# Patient Record
Sex: FEMALE | Race: WHITE | NOT HISPANIC OR LATINO
[De-identification: names, ages, dates, MRNs, and addresses within clinical notes are randomized per-mention and may not be internally consistent; named-entity substitution may affect disease eponyms.]

---

## 2017-01-11 ENCOUNTER — LABORATORY RESULT (OUTPATIENT)
Age: 82
End: 2017-01-11

## 2017-01-23 ENCOUNTER — MEDICATION RENEWAL (OUTPATIENT)
Age: 82
End: 2017-01-23

## 2017-01-25 ENCOUNTER — CHART COPY (OUTPATIENT)
Age: 82
End: 2017-01-25

## 2017-01-26 ENCOUNTER — MEDICATION RENEWAL (OUTPATIENT)
Age: 82
End: 2017-01-26

## 2017-02-13 ENCOUNTER — LABORATORY RESULT (OUTPATIENT)
Age: 82
End: 2017-02-13

## 2017-03-14 ENCOUNTER — APPOINTMENT (OUTPATIENT)
Dept: INTERNAL MEDICINE | Facility: CLINIC | Age: 82
End: 2017-03-14

## 2017-03-16 ENCOUNTER — LABORATORY RESULT (OUTPATIENT)
Age: 82
End: 2017-03-16

## 2017-04-24 ENCOUNTER — LABORATORY RESULT (OUTPATIENT)
Age: 82
End: 2017-04-24

## 2017-05-17 ENCOUNTER — APPOINTMENT (OUTPATIENT)
Dept: ELECTROPHYSIOLOGY | Facility: CLINIC | Age: 82
End: 2017-05-17

## 2017-05-17 VITALS — DIASTOLIC BLOOD PRESSURE: 65 MMHG | SYSTOLIC BLOOD PRESSURE: 138 MMHG | OXYGEN SATURATION: 97 %

## 2017-05-25 ENCOUNTER — APPOINTMENT (OUTPATIENT)
Dept: INTERNAL MEDICINE | Facility: CLINIC | Age: 82
End: 2017-05-25

## 2017-05-25 ENCOUNTER — MEDICATION RENEWAL (OUTPATIENT)
Age: 82
End: 2017-05-25

## 2017-05-25 VITALS
WEIGHT: 142 LBS | OXYGEN SATURATION: 95 % | SYSTOLIC BLOOD PRESSURE: 159 MMHG | DIASTOLIC BLOOD PRESSURE: 67 MMHG | TEMPERATURE: 97.4 F | HEART RATE: 70 BPM | BODY MASS INDEX: 24.37 KG/M2

## 2017-05-26 LAB
25(OH)D3 SERPL-MCNC: 38.9 NG/ML
ALBUMIN SERPL ELPH-MCNC: 4.5 G/DL
ALP BLD-CCNC: 110 U/L
ALT SERPL-CCNC: 29 U/L
ANION GAP SERPL CALC-SCNC: 22 MMOL/L
AST SERPL-CCNC: 30 U/L
BASOPHILS # BLD AUTO: 0.03 K/UL
BASOPHILS NFR BLD AUTO: 0.4 %
BILIRUB SERPL-MCNC: 0.4 MG/DL
BUN SERPL-MCNC: 41 MG/DL
CALCIUM SERPL-MCNC: 10 MG/DL
CHLORIDE SERPL-SCNC: 106 MMOL/L
CHOLEST SERPL-MCNC: 169 MG/DL
CHOLEST/HDLC SERPL: 3.5 RATIO
CO2 SERPL-SCNC: 17 MMOL/L
CREAT SERPL-MCNC: 1.51 MG/DL
EOSINOPHIL # BLD AUTO: 0.13 K/UL
EOSINOPHIL NFR BLD AUTO: 1.5 %
FOLATE SERPL-MCNC: >20 NG/ML
GLUCOSE SERPL-MCNC: 95 MG/DL
HBA1C MFR BLD HPLC: 5.7 %
HCT VFR BLD CALC: 39.2 %
HDLC SERPL-MCNC: 48 MG/DL
HGB BLD-MCNC: 12.7 G/DL
IMM GRANULOCYTES NFR BLD AUTO: 0.1 %
LDLC SERPL CALC-MCNC: 85 MG/DL
LYMPHOCYTES # BLD AUTO: 2.67 K/UL
LYMPHOCYTES NFR BLD AUTO: 31.7 %
MAN DIFF?: NORMAL
MCHC RBC-ENTMCNC: 30.9 PG
MCHC RBC-ENTMCNC: 32.4 GM/DL
MCV RBC AUTO: 95.4 FL
MONOCYTES # BLD AUTO: 0.71 K/UL
MONOCYTES NFR BLD AUTO: 8.4 %
NEUTROPHILS # BLD AUTO: 4.87 K/UL
NEUTROPHILS NFR BLD AUTO: 57.9 %
PLATELET # BLD AUTO: 292 K/UL
POTASSIUM SERPL-SCNC: 4.6 MMOL/L
PROT SERPL-MCNC: 7.4 G/DL
RBC # BLD: 4.11 M/UL
RBC # FLD: 15.1 %
SODIUM SERPL-SCNC: 145 MMOL/L
T4 SERPL-MCNC: 8.4 UG/DL
TRIGL SERPL-MCNC: 179 MG/DL
TSH SERPL-ACNC: 2.97 UIU/ML
VIT B12 SERPL-MCNC: 1405 PG/ML
WBC # FLD AUTO: 8.42 K/UL

## 2017-06-18 ENCOUNTER — NON-APPOINTMENT (OUTPATIENT)
Age: 82
End: 2017-06-18

## 2017-06-28 ENCOUNTER — APPOINTMENT (OUTPATIENT)
Dept: INTERNAL MEDICINE | Facility: CLINIC | Age: 82
End: 2017-06-28

## 2017-06-28 ENCOUNTER — MED ADMIN CHARGE (OUTPATIENT)
Age: 82
End: 2017-06-28

## 2017-06-28 VITALS
SYSTOLIC BLOOD PRESSURE: 138 MMHG | HEART RATE: 70 BPM | OXYGEN SATURATION: 94 % | WEIGHT: 139 LBS | TEMPERATURE: 97.6 F | BODY MASS INDEX: 23.73 KG/M2 | HEIGHT: 64 IN | DIASTOLIC BLOOD PRESSURE: 71 MMHG

## 2017-06-28 DIAGNOSIS — M25.562 PAIN IN RIGHT KNEE: ICD-10-CM

## 2017-06-28 DIAGNOSIS — M25.561 PAIN IN RIGHT KNEE: ICD-10-CM

## 2017-07-31 ENCOUNTER — MEDICATION RENEWAL (OUTPATIENT)
Age: 82
End: 2017-07-31

## 2017-08-21 ENCOUNTER — APPOINTMENT (OUTPATIENT)
Dept: INTERNAL MEDICINE | Facility: CLINIC | Age: 82
End: 2017-08-21
Payer: MEDICARE

## 2017-08-21 VITALS
DIASTOLIC BLOOD PRESSURE: 75 MMHG | HEIGHT: 64 IN | HEART RATE: 71 BPM | OXYGEN SATURATION: 98 % | TEMPERATURE: 98 F | SYSTOLIC BLOOD PRESSURE: 118 MMHG

## 2017-08-21 LAB
INR PPP: 2.9 RATIO
QUALITY CONTROL: YES

## 2017-08-21 PROCEDURE — 99214 OFFICE O/P EST MOD 30 MIN: CPT | Mod: 25

## 2017-08-21 PROCEDURE — 85610 PROTHROMBIN TIME: CPT | Mod: QW

## 2017-08-22 LAB
ALBUMIN SERPL ELPH-MCNC: 3.9 G/DL
ALP BLD-CCNC: 119 U/L
ALT SERPL-CCNC: 40 U/L
ANION GAP SERPL CALC-SCNC: 18 MMOL/L
AST SERPL-CCNC: 23 U/L
BASOPHILS # BLD AUTO: 0.02 K/UL
BASOPHILS NFR BLD AUTO: 0.3 %
BILIRUB SERPL-MCNC: 0.3 MG/DL
BUN SERPL-MCNC: 48 MG/DL
CALCIUM SERPL-MCNC: 10 MG/DL
CHLORIDE SERPL-SCNC: 107 MMOL/L
CO2 SERPL-SCNC: 17 MMOL/L
CREAT SERPL-MCNC: 1.98 MG/DL
EOSINOPHIL # BLD AUTO: 0.11 K/UL
EOSINOPHIL NFR BLD AUTO: 1.7 %
GLUCOSE SERPL-MCNC: 120 MG/DL
HCT VFR BLD CALC: 34.7 %
HGB BLD-MCNC: 10.9 G/DL
IMM GRANULOCYTES NFR BLD AUTO: 0.2 %
LYMPHOCYTES # BLD AUTO: 2.06 K/UL
LYMPHOCYTES NFR BLD AUTO: 31.5 %
MAN DIFF?: NORMAL
MCHC RBC-ENTMCNC: 29.8 PG
MCHC RBC-ENTMCNC: 31.4 GM/DL
MCV RBC AUTO: 94.8 FL
MONOCYTES # BLD AUTO: 0.68 K/UL
MONOCYTES NFR BLD AUTO: 10.4 %
NEUTROPHILS # BLD AUTO: 3.66 K/UL
NEUTROPHILS NFR BLD AUTO: 55.9 %
PLATELET # BLD AUTO: 304 K/UL
POTASSIUM SERPL-SCNC: 4.2 MMOL/L
PROT SERPL-MCNC: 7 G/DL
RBC # BLD: 3.66 M/UL
RBC # FLD: 14.9 %
SODIUM SERPL-SCNC: 142 MMOL/L
WBC # FLD AUTO: 6.54 K/UL

## 2017-09-11 ENCOUNTER — LABORATORY RESULT (OUTPATIENT)
Age: 82
End: 2017-09-11

## 2017-09-13 ENCOUNTER — NON-APPOINTMENT (OUTPATIENT)
Age: 82
End: 2017-09-13

## 2017-09-13 ENCOUNTER — APPOINTMENT (OUTPATIENT)
Dept: ELECTROPHYSIOLOGY | Facility: CLINIC | Age: 82
End: 2017-09-13
Payer: MEDICARE

## 2017-09-13 VITALS — SYSTOLIC BLOOD PRESSURE: 125 MMHG | OXYGEN SATURATION: 97 % | DIASTOLIC BLOOD PRESSURE: 72 MMHG | HEART RATE: 99 BPM

## 2017-09-13 PROCEDURE — 93000 ELECTROCARDIOGRAM COMPLETE: CPT

## 2017-09-13 PROCEDURE — 99215 OFFICE O/P EST HI 40 MIN: CPT

## 2017-09-27 ENCOUNTER — LABORATORY RESULT (OUTPATIENT)
Age: 82
End: 2017-09-27

## 2017-10-04 ENCOUNTER — APPOINTMENT (OUTPATIENT)
Dept: INTERNAL MEDICINE | Facility: CLINIC | Age: 82
End: 2017-10-04
Payer: MEDICARE

## 2017-10-04 VITALS
SYSTOLIC BLOOD PRESSURE: 172 MMHG | BODY MASS INDEX: 22.88 KG/M2 | WEIGHT: 134 LBS | TEMPERATURE: 97.9 F | DIASTOLIC BLOOD PRESSURE: 69 MMHG | HEART RATE: 72 BPM | HEIGHT: 64 IN | OXYGEN SATURATION: 98 %

## 2017-10-04 DIAGNOSIS — Z01.818 ENCOUNTER FOR OTHER PREPROCEDURAL EXAMINATION: ICD-10-CM

## 2017-10-04 LAB
INR PPP: 2.4 RATIO
QUALITY CONTROL: YES

## 2017-10-04 PROCEDURE — 99214 OFFICE O/P EST MOD 30 MIN: CPT | Mod: 25

## 2017-10-04 PROCEDURE — 90662 IIV NO PRSV INCREASED AG IM: CPT

## 2017-10-04 PROCEDURE — 85610 PROTHROMBIN TIME: CPT | Mod: QW

## 2017-10-04 PROCEDURE — G0008: CPT

## 2017-11-01 ENCOUNTER — APPOINTMENT (OUTPATIENT)
Dept: INTERNAL MEDICINE | Facility: CLINIC | Age: 82
End: 2017-11-01
Payer: MEDICARE

## 2017-11-01 VITALS
WEIGHT: 133 LBS | HEIGHT: 64 IN | HEART RATE: 75 BPM | BODY MASS INDEX: 22.71 KG/M2 | OXYGEN SATURATION: 97 % | TEMPERATURE: 97.8 F | SYSTOLIC BLOOD PRESSURE: 161 MMHG | DIASTOLIC BLOOD PRESSURE: 78 MMHG

## 2017-11-01 LAB — INR PPP: 1.7 RATIO

## 2017-11-01 PROCEDURE — 85610 PROTHROMBIN TIME: CPT | Mod: QW

## 2017-11-01 PROCEDURE — 99214 OFFICE O/P EST MOD 30 MIN: CPT | Mod: 25

## 2017-11-01 PROCEDURE — 36415 COLL VENOUS BLD VENIPUNCTURE: CPT

## 2017-11-02 LAB
ALBUMIN SERPL ELPH-MCNC: 4.3 G/DL
ALP BLD-CCNC: 123 U/L
ALT SERPL-CCNC: 25 U/L
ANION GAP SERPL CALC-SCNC: 20 MMOL/L
AST SERPL-CCNC: 29 U/L
BILIRUB SERPL-MCNC: 0.3 MG/DL
BUN SERPL-MCNC: 48 MG/DL
CALCIUM SERPL-MCNC: 10.4 MG/DL
CHLORIDE SERPL-SCNC: 105 MMOL/L
CO2 SERPL-SCNC: 20 MMOL/L
CREAT SERPL-MCNC: 1.44 MG/DL
GLUCOSE SERPL-MCNC: 107 MG/DL
POTASSIUM SERPL-SCNC: 4 MMOL/L
PROT SERPL-MCNC: 7.3 G/DL
SODIUM SERPL-SCNC: 145 MMOL/L

## 2017-11-14 ENCOUNTER — MEDICATION RENEWAL (OUTPATIENT)
Age: 82
End: 2017-11-14

## 2017-11-21 ENCOUNTER — MEDICATION RENEWAL (OUTPATIENT)
Age: 82
End: 2017-11-21

## 2017-11-29 ENCOUNTER — LABORATORY RESULT (OUTPATIENT)
Age: 82
End: 2017-11-29

## 2017-12-13 ENCOUNTER — APPOINTMENT (OUTPATIENT)
Dept: ELECTROPHYSIOLOGY | Facility: CLINIC | Age: 82
End: 2017-12-13
Payer: MEDICARE

## 2017-12-13 VITALS — DIASTOLIC BLOOD PRESSURE: 73 MMHG | SYSTOLIC BLOOD PRESSURE: 131 MMHG | HEART RATE: 75 BPM | OXYGEN SATURATION: 98 %

## 2017-12-13 PROCEDURE — 93000 ELECTROCARDIOGRAM COMPLETE: CPT

## 2017-12-13 PROCEDURE — 99215 OFFICE O/P EST HI 40 MIN: CPT

## 2017-12-20 DIAGNOSIS — M54.12 RADICULOPATHY, CERVICAL REGION: ICD-10-CM

## 2017-12-27 ENCOUNTER — LABORATORY RESULT (OUTPATIENT)
Age: 82
End: 2017-12-27

## 2018-01-07 ENCOUNTER — NON-APPOINTMENT (OUTPATIENT)
Age: 83
End: 2018-01-07

## 2018-01-24 ENCOUNTER — LABORATORY RESULT (OUTPATIENT)
Age: 83
End: 2018-01-24

## 2018-02-08 ENCOUNTER — MEDICATION RENEWAL (OUTPATIENT)
Age: 83
End: 2018-02-08

## 2018-02-21 ENCOUNTER — LABORATORY RESULT (OUTPATIENT)
Age: 83
End: 2018-02-21

## 2018-02-22 ENCOUNTER — APPOINTMENT (OUTPATIENT)
Dept: INTERNAL MEDICINE | Facility: CLINIC | Age: 83
End: 2018-02-22
Payer: MEDICARE

## 2018-02-22 VITALS
HEART RATE: 70 BPM | HEIGHT: 64 IN | WEIGHT: 133 LBS | DIASTOLIC BLOOD PRESSURE: 80 MMHG | BODY MASS INDEX: 22.71 KG/M2 | OXYGEN SATURATION: 98 % | SYSTOLIC BLOOD PRESSURE: 170 MMHG | TEMPERATURE: 97.6 F

## 2018-02-22 PROCEDURE — 99214 OFFICE O/P EST MOD 30 MIN: CPT

## 2018-02-22 RX ORDER — CANDESARTAN CILEXETIL 8 MG/1
8 TABLET ORAL DAILY
Qty: 90 | Refills: 3 | Status: DISCONTINUED | COMMUNITY
Start: 2017-11-21 | End: 2018-02-22

## 2018-02-22 RX ORDER — METHYLPREDNISOLONE 4 MG/1
4 TABLET ORAL
Qty: 21 | Refills: 1 | Status: DISCONTINUED | COMMUNITY
Start: 2017-12-20 | End: 2018-02-22

## 2018-03-06 ENCOUNTER — APPOINTMENT (OUTPATIENT)
Dept: INTERNAL MEDICINE | Facility: CLINIC | Age: 83
End: 2018-03-06

## 2018-03-21 ENCOUNTER — LABORATORY RESULT (OUTPATIENT)
Age: 83
End: 2018-03-21

## 2018-03-26 ENCOUNTER — MEDICATION RENEWAL (OUTPATIENT)
Age: 83
End: 2018-03-26

## 2018-03-27 ENCOUNTER — LABORATORY RESULT (OUTPATIENT)
Age: 83
End: 2018-03-27

## 2018-03-27 ENCOUNTER — APPOINTMENT (OUTPATIENT)
Dept: INTERNAL MEDICINE | Facility: CLINIC | Age: 83
End: 2018-03-27
Payer: MEDICARE

## 2018-03-27 VITALS
SYSTOLIC BLOOD PRESSURE: 130 MMHG | WEIGHT: 135 LBS | DIASTOLIC BLOOD PRESSURE: 80 MMHG | TEMPERATURE: 97.3 F | HEART RATE: 58 BPM | OXYGEN SATURATION: 98 % | HEIGHT: 64 IN | BODY MASS INDEX: 23.05 KG/M2

## 2018-03-27 PROCEDURE — 36415 COLL VENOUS BLD VENIPUNCTURE: CPT

## 2018-03-27 PROCEDURE — 99214 OFFICE O/P EST MOD 30 MIN: CPT | Mod: 25

## 2018-03-28 LAB
B BURGDOR IGG+IGM SER QL IB: NORMAL
BASOPHILS # BLD AUTO: 0.03 K/UL
BASOPHILS NFR BLD AUTO: 0.4 %
CRP SERPL-MCNC: 5 MG/DL
DSDNA AB SER-ACNC: 38 IU/ML
EOSINOPHIL # BLD AUTO: 0.07 K/UL
EOSINOPHIL NFR BLD AUTO: 1 %
ERYTHROCYTE [SEDIMENTATION RATE] IN BLOOD BY WESTERGREN METHOD: 62 MM/HR
HCT VFR BLD CALC: 42.1 %
HGB BLD-MCNC: 13.6 G/DL
IMM GRANULOCYTES NFR BLD AUTO: 0.1 %
LYMPHOCYTES # BLD AUTO: 1.8 K/UL
LYMPHOCYTES NFR BLD AUTO: 24.8 %
MAN DIFF?: NORMAL
MCHC RBC-ENTMCNC: 31.6 PG
MCHC RBC-ENTMCNC: 32.3 GM/DL
MCV RBC AUTO: 97.7 FL
MONOCYTES # BLD AUTO: 0.79 K/UL
MONOCYTES NFR BLD AUTO: 10.9 %
NEUTROPHILS # BLD AUTO: 4.57 K/UL
NEUTROPHILS NFR BLD AUTO: 62.8 %
PLATELET # BLD AUTO: 285 K/UL
RBC # BLD: 4.31 M/UL
RBC # FLD: 14.3 %
RHEUMATOID FACT SER QL: 13 IU/ML
URATE SERPL-MCNC: 8.4 MG/DL
WBC # FLD AUTO: 7.27 K/UL

## 2018-03-30 LAB
ANA PAT FLD IF-IMP: ABNORMAL
ANA SER IF-ACNC: ABNORMAL

## 2018-04-04 ENCOUNTER — MEDICATION RENEWAL (OUTPATIENT)
Age: 83
End: 2018-04-04

## 2018-04-11 ENCOUNTER — APPOINTMENT (OUTPATIENT)
Dept: ELECTROPHYSIOLOGY | Facility: CLINIC | Age: 83
End: 2018-04-11
Payer: MEDICARE

## 2018-04-11 VITALS — DIASTOLIC BLOOD PRESSURE: 76 MMHG | SYSTOLIC BLOOD PRESSURE: 186 MMHG

## 2018-04-11 VITALS
BODY MASS INDEX: 23.05 KG/M2 | SYSTOLIC BLOOD PRESSURE: 201 MMHG | OXYGEN SATURATION: 98 % | HEIGHT: 64 IN | DIASTOLIC BLOOD PRESSURE: 79 MMHG | WEIGHT: 135 LBS | HEART RATE: 77 BPM

## 2018-04-11 PROCEDURE — 93000 ELECTROCARDIOGRAM COMPLETE: CPT

## 2018-04-11 PROCEDURE — 99214 OFFICE O/P EST MOD 30 MIN: CPT

## 2018-04-11 RX ORDER — PREDNISONE 5 MG/1
5 TABLET ORAL
Qty: 42 | Refills: 0 | Status: DISCONTINUED | COMMUNITY
Start: 2018-02-08 | End: 2018-04-11

## 2018-04-17 ENCOUNTER — FORM ENCOUNTER (OUTPATIENT)
Age: 83
End: 2018-04-17

## 2018-04-18 ENCOUNTER — APPOINTMENT (OUTPATIENT)
Dept: CT IMAGING | Facility: CLINIC | Age: 83
End: 2018-04-18
Payer: MEDICARE

## 2018-04-18 ENCOUNTER — OUTPATIENT (OUTPATIENT)
Dept: OUTPATIENT SERVICES | Facility: HOSPITAL | Age: 83
LOS: 1 days | End: 2018-04-18
Payer: MEDICARE

## 2018-04-18 ENCOUNTER — APPOINTMENT (OUTPATIENT)
Dept: INTERNAL MEDICINE | Facility: CLINIC | Age: 83
End: 2018-04-18
Payer: MEDICARE

## 2018-04-18 VITALS
WEIGHT: 135 LBS | TEMPERATURE: 98.5 F | BODY MASS INDEX: 23.05 KG/M2 | OXYGEN SATURATION: 97 % | HEIGHT: 64 IN | SYSTOLIC BLOOD PRESSURE: 188 MMHG | DIASTOLIC BLOOD PRESSURE: 76 MMHG | HEART RATE: 72 BPM

## 2018-04-18 DIAGNOSIS — M79.643 PAIN IN UNSPECIFIED HAND: ICD-10-CM

## 2018-04-18 LAB
INR PPP: 1.3 RATIO
QUALITY CONTROL: YES

## 2018-04-18 PROCEDURE — 85610 PROTHROMBIN TIME: CPT | Mod: QW

## 2018-04-18 PROCEDURE — 72125 CT NECK SPINE W/O DYE: CPT

## 2018-04-18 PROCEDURE — 99214 OFFICE O/P EST MOD 30 MIN: CPT | Mod: 25

## 2018-04-18 PROCEDURE — 72125 CT NECK SPINE W/O DYE: CPT | Mod: 26

## 2018-04-18 RX ORDER — COLCHICINE 0.6 MG/1
0.6 CAPSULE ORAL
Qty: 20 | Refills: 0 | Status: DISCONTINUED | COMMUNITY
Start: 2018-04-04 | End: 2018-04-18

## 2018-04-20 ENCOUNTER — MEDICATION RENEWAL (OUTPATIENT)
Age: 83
End: 2018-04-20

## 2018-04-22 ENCOUNTER — NON-APPOINTMENT (OUTPATIENT)
Age: 83
End: 2018-04-22

## 2018-05-02 ENCOUNTER — LABORATORY RESULT (OUTPATIENT)
Age: 83
End: 2018-05-02

## 2018-05-16 ENCOUNTER — RX RENEWAL (OUTPATIENT)
Age: 83
End: 2018-05-16

## 2018-05-16 ENCOUNTER — LABORATORY RESULT (OUTPATIENT)
Age: 83
End: 2018-05-16

## 2018-06-01 ENCOUNTER — LABORATORY RESULT (OUTPATIENT)
Age: 83
End: 2018-06-01

## 2018-06-13 ENCOUNTER — LABORATORY RESULT (OUTPATIENT)
Age: 83
End: 2018-06-13

## 2018-06-27 ENCOUNTER — LABORATORY RESULT (OUTPATIENT)
Age: 83
End: 2018-06-27

## 2018-06-28 ENCOUNTER — APPOINTMENT (OUTPATIENT)
Dept: NEUROLOGY | Facility: CLINIC | Age: 83
End: 2018-06-28
Payer: MEDICARE

## 2018-06-28 VITALS
HEART RATE: 73 BPM | BODY MASS INDEX: 23.39 KG/M2 | WEIGHT: 137 LBS | HEIGHT: 64 IN | DIASTOLIC BLOOD PRESSURE: 64 MMHG | SYSTOLIC BLOOD PRESSURE: 170 MMHG

## 2018-06-28 DIAGNOSIS — M79.643 PAIN IN UNSPECIFIED HAND: ICD-10-CM

## 2018-06-28 PROCEDURE — 99205 OFFICE O/P NEW HI 60 MIN: CPT

## 2018-07-12 ENCOUNTER — LABORATORY RESULT (OUTPATIENT)
Age: 83
End: 2018-07-12

## 2018-07-12 ENCOUNTER — CLINICAL ADVICE (OUTPATIENT)
Age: 83
End: 2018-07-12

## 2018-08-02 ENCOUNTER — LABORATORY RESULT (OUTPATIENT)
Age: 83
End: 2018-08-02

## 2018-08-06 ENCOUNTER — MEDICATION RENEWAL (OUTPATIENT)
Age: 83
End: 2018-08-06

## 2018-08-16 ENCOUNTER — LABORATORY RESULT (OUTPATIENT)
Age: 83
End: 2018-08-16

## 2018-08-30 ENCOUNTER — LABORATORY RESULT (OUTPATIENT)
Age: 83
End: 2018-08-30

## 2018-09-12 ENCOUNTER — APPOINTMENT (OUTPATIENT)
Dept: NEUROLOGY | Facility: CLINIC | Age: 83
End: 2018-09-12
Payer: MEDICARE

## 2018-09-12 ENCOUNTER — APPOINTMENT (OUTPATIENT)
Dept: ELECTROPHYSIOLOGY | Facility: CLINIC | Age: 83
End: 2018-09-12
Payer: MEDICARE

## 2018-09-12 VITALS — DIASTOLIC BLOOD PRESSURE: 69 MMHG | OXYGEN SATURATION: 98 % | SYSTOLIC BLOOD PRESSURE: 148 MMHG | HEART RATE: 72 BPM

## 2018-09-12 PROCEDURE — 99214 OFFICE O/P EST MOD 30 MIN: CPT

## 2018-09-12 PROCEDURE — 99212 OFFICE O/P EST SF 10 MIN: CPT | Mod: 25

## 2018-09-12 PROCEDURE — 95910 NRV CNDJ TEST 7-8 STUDIES: CPT

## 2018-09-12 PROCEDURE — 93000 ELECTROCARDIOGRAM COMPLETE: CPT

## 2018-09-12 RX ORDER — PREDNISONE 5 MG/1
5 TABLET ORAL DAILY
Qty: 10 | Refills: 3 | Status: DISCONTINUED | COMMUNITY
Start: 2018-04-20 | End: 2018-09-12

## 2018-09-12 RX ORDER — PREDNISONE 5 MG/1
5 TABLET ORAL
Qty: 90 | Refills: 3 | Status: DISCONTINUED | COMMUNITY
Start: 2018-03-26 | End: 2018-09-12

## 2018-09-12 RX ORDER — CANDESARTAN CILEXETIL AND HYDROCHLOROTHIAZIDE 16; 12.5 MG/1; MG/1
16-12.5 TABLET ORAL
Qty: 90 | Refills: 3 | Status: DISCONTINUED | COMMUNITY
Start: 2017-10-04 | End: 2018-09-12

## 2018-09-12 RX ORDER — HYDROCHLOROTHIAZIDE 12.5 MG/1
12.5 CAPSULE ORAL DAILY
Qty: 90 | Refills: 3 | Status: DISCONTINUED | COMMUNITY
Start: 2018-05-17 | End: 2018-09-12

## 2018-09-12 RX ORDER — GABAPENTIN 100 MG/1
100 CAPSULE ORAL
Qty: 60 | Refills: 0 | Status: DISCONTINUED | COMMUNITY
Start: 2018-06-28 | End: 2018-09-12

## 2018-09-13 ENCOUNTER — LABORATORY RESULT (OUTPATIENT)
Age: 83
End: 2018-09-13

## 2018-10-01 ENCOUNTER — LABORATORY RESULT (OUTPATIENT)
Age: 83
End: 2018-10-01

## 2018-10-06 ENCOUNTER — NON-APPOINTMENT (OUTPATIENT)
Age: 83
End: 2018-10-06

## 2018-10-15 ENCOUNTER — LABORATORY RESULT (OUTPATIENT)
Age: 83
End: 2018-10-15

## 2018-10-17 ENCOUNTER — APPOINTMENT (OUTPATIENT)
Dept: INTERNAL MEDICINE | Facility: CLINIC | Age: 83
End: 2018-10-17
Payer: MEDICARE

## 2018-10-17 VITALS
BODY MASS INDEX: 23.05 KG/M2 | WEIGHT: 135 LBS | SYSTOLIC BLOOD PRESSURE: 130 MMHG | HEART RATE: 80 BPM | HEIGHT: 64 IN | DIASTOLIC BLOOD PRESSURE: 78 MMHG

## 2018-10-17 DIAGNOSIS — M19.90 UNSPECIFIED OSTEOARTHRITIS, UNSPECIFIED SITE: ICD-10-CM

## 2018-10-17 DIAGNOSIS — G56.01 CARPAL TUNNEL SYNDROME, RIGHT UPPER LIMB: ICD-10-CM

## 2018-10-17 PROCEDURE — 90662 IIV NO PRSV INCREASED AG IM: CPT

## 2018-10-17 PROCEDURE — G0008: CPT

## 2018-10-17 PROCEDURE — 99214 OFFICE O/P EST MOD 30 MIN: CPT | Mod: 25

## 2018-10-17 NOTE — REVIEW OF SYSTEMS
[Fever] : no fever [Earache] : no earache [Nasal Discharge] : no nasal discharge [Chest Pain] : no chest pain [Shortness Of Breath] : no shortness of breath [Abdominal Pain] : no abdominal pain [FreeTextEntry5] : regular rhythm

## 2018-10-17 NOTE — HISTORY OF PRESENT ILLNESS
[FreeTextEntry1] : \par Afib and wrist issue [de-identified] : Hand still mild problems but better off prednisone\par warfarin requirement has decrease to 10\par inr changing will need f/u inr and lood in 2 week

## 2018-10-17 NOTE — PLAN
[FreeTextEntry1] : INR good\par f/u 2 week\par bp good on multiple med\par heart rate controlled\par arthalgia/arthitis /carpal tunnel  repeat bloods to be ordered Rhemu???\par Flu HP

## 2018-10-17 NOTE — PHYSICAL EXAM
[No Acute Distress] : no acute distress [Well Nourished] : well nourished [Normal Outer Ear/Nose] : the outer ears and nose were normal in appearance [Normal Oropharynx] : the oropharynx was normal [No JVD] : no jugular venous distention [Supple] : supple [No Respiratory Distress] : no respiratory distress  [Clear to Auscultation] : lungs were clear to auscultation bilaterally [Normal Rate] : normal rate  [Regular Rhythm] : with a regular rhythm [de-identified] : regular 76

## 2018-10-31 ENCOUNTER — LABORATORY RESULT (OUTPATIENT)
Age: 83
End: 2018-10-31

## 2018-11-12 ENCOUNTER — LABORATORY RESULT (OUTPATIENT)
Age: 83
End: 2018-11-12

## 2018-11-26 ENCOUNTER — LABORATORY RESULT (OUTPATIENT)
Age: 83
End: 2018-11-26

## 2018-12-10 ENCOUNTER — LABORATORY RESULT (OUTPATIENT)
Age: 83
End: 2018-12-10

## 2018-12-24 ENCOUNTER — LABORATORY RESULT (OUTPATIENT)
Age: 83
End: 2018-12-24

## 2019-01-07 ENCOUNTER — LABORATORY RESULT (OUTPATIENT)
Age: 84
End: 2019-01-07

## 2019-01-21 ENCOUNTER — LABORATORY RESULT (OUTPATIENT)
Age: 84
End: 2019-01-21

## 2019-02-20 ENCOUNTER — LABORATORY RESULT (OUTPATIENT)
Age: 84
End: 2019-02-20

## 2019-03-18 ENCOUNTER — LABORATORY RESULT (OUTPATIENT)
Age: 84
End: 2019-03-18

## 2019-03-20 ENCOUNTER — APPOINTMENT (OUTPATIENT)
Dept: ELECTROPHYSIOLOGY | Facility: CLINIC | Age: 84
End: 2019-03-20

## 2019-04-15 ENCOUNTER — LABORATORY RESULT (OUTPATIENT)
Age: 84
End: 2019-04-15

## 2019-04-19 ENCOUNTER — APPOINTMENT (OUTPATIENT)
Dept: ELECTROPHYSIOLOGY | Facility: CLINIC | Age: 84
End: 2019-04-19

## 2019-04-23 ENCOUNTER — APPOINTMENT (OUTPATIENT)
Dept: ELECTROPHYSIOLOGY | Facility: CLINIC | Age: 84
End: 2019-04-23
Payer: MEDICARE

## 2019-04-23 VITALS
DIASTOLIC BLOOD PRESSURE: 61 MMHG | HEART RATE: 76 BPM | SYSTOLIC BLOOD PRESSURE: 145 MMHG | OXYGEN SATURATION: 96 % | HEIGHT: 64 IN | WEIGHT: 135 LBS | BODY MASS INDEX: 23.05 KG/M2

## 2019-04-23 PROCEDURE — 93280 PM DEVICE PROGR EVAL DUAL: CPT

## 2019-05-13 ENCOUNTER — LABORATORY RESULT (OUTPATIENT)
Age: 84
End: 2019-05-13

## 2019-05-29 ENCOUNTER — MEDICATION RENEWAL (OUTPATIENT)
Age: 84
End: 2019-05-29

## 2019-06-10 ENCOUNTER — LABORATORY RESULT (OUTPATIENT)
Age: 84
End: 2019-06-10

## 2019-07-08 ENCOUNTER — LABORATORY RESULT (OUTPATIENT)
Age: 84
End: 2019-07-08

## 2019-07-13 ENCOUNTER — APPOINTMENT (OUTPATIENT)
Dept: CARDIOLOGY | Facility: CLINIC | Age: 84
End: 2019-07-13
Payer: MEDICARE

## 2019-07-13 ENCOUNTER — NON-APPOINTMENT (OUTPATIENT)
Age: 84
End: 2019-07-13

## 2019-07-13 VITALS
SYSTOLIC BLOOD PRESSURE: 160 MMHG | HEART RATE: 71 BPM | OXYGEN SATURATION: 95 % | DIASTOLIC BLOOD PRESSURE: 66 MMHG | HEIGHT: 64 IN | WEIGHT: 133 LBS | BODY MASS INDEX: 22.71 KG/M2

## 2019-07-13 PROCEDURE — 99214 OFFICE O/P EST MOD 30 MIN: CPT

## 2019-07-13 PROCEDURE — 93000 ELECTROCARDIOGRAM COMPLETE: CPT

## 2019-07-13 NOTE — PHYSICAL EXAM
[General Appearance - Well Developed] : well developed [General Appearance - In No Acute Distress] : no acute distress [Normal Conjunctiva] : the conjunctiva exhibited no abnormalities [Normal Jugular Venous V Waves Present] : normal jugular venous V waves present [Respiration, Rhythm And Depth] : normal respiratory rhythm and effort [No Oral Pallor] : no oral pallor [Auscultation Breath Sounds / Voice Sounds] : lungs were clear to auscultation bilaterally [Abdomen Soft] : soft [Abnormal Walk] : normal gait [Abdomen Mass (___ Cm)] : no abdominal mass palpated [Abdomen Tenderness] : non-tender [Nail Clubbing] : no clubbing of the fingernails [Cyanosis, Localized] : no localized cyanosis [No Venous Stasis] : no venous stasis [Impaired Insight] : insight and judgment were intact [Normal] : normal [Rhythm Regular] : regular [Normal Rate] : normal [Normal S1] : normal S1 [No Gallop] : no gallop heard [Normal S2] : normal S2 [2+] : left 2+ [No Pitting Edema] : no pitting edema present

## 2019-07-17 ENCOUNTER — MEDICATION RENEWAL (OUTPATIENT)
Age: 84
End: 2019-07-17

## 2019-07-24 NOTE — REASON FOR VISIT
[Atrial Fibrillation] : atrial fibrillation [Pacemaker Evaluation] : pacemaker ~T evaluation ~C was performed [Family Member] : family member [Other: _____] : [unfilled] [FreeTextEntry2] : follow up

## 2019-07-24 NOTE — HISTORY OF PRESENT ILLNESS
[FreeTextEntry1] : \par 87yo woman with h/o HTN controlled on med, SSS s/p PPM Dual chamber St Dinh, pAF - failed propafanone and now only on BB and mostly in SR.\par Feeling well and denies palps, CP, SOB, dizziness, lightheadedness. \par

## 2019-07-24 NOTE — DISCUSSION/SUMMARY
[Paroxysmal Atrial Fibrillation] : paroxysmal atrial fibrillation [Responding to Treatment] : responding to treatment [Rhythm Control] : rhythm control [None] : none [Anticoagulation] : anticoagulation [Pacemaker Function Normal] : normal pacemaker function [Patient] : the patient [Family] : the patient's family [FreeTextEntry1] : The PM was interrogated and functioning normal. She is mostlyatrial paced and minimal ventricular paced. Battery Longevity approx 6.5 yrs. \par Her AF burden is 7%.  Ventricular rate controlled.  She is currently on Coumadin (switching to a NOAC previously discussed - wants to continue on Coumadin).  \par BP better controlled - on higher dose beta blocker and Nifedipine.  Follow up with Dr. Cazares. Followup EP eval in 6 months.

## 2019-07-24 NOTE — REVIEW OF SYSTEMS
[see HPI] : see HPI [Joint Pain] : joint pain [Negative] : Heme/Lymph [Fever] : no fever [Recent Weight Gain (___ Lbs)] : no recent weight gain [Feeling Fatigued] : not feeling fatigued [Recent Weight Loss (___ Lbs)] : no recent weight loss [Chest Pain] : no chest pain

## 2019-08-05 ENCOUNTER — LABORATORY RESULT (OUTPATIENT)
Age: 84
End: 2019-08-05

## 2019-09-09 ENCOUNTER — LABORATORY RESULT (OUTPATIENT)
Age: 84
End: 2019-09-09

## 2019-09-23 ENCOUNTER — LABORATORY RESULT (OUTPATIENT)
Age: 84
End: 2019-09-23

## 2019-10-17 ENCOUNTER — APPOINTMENT (OUTPATIENT)
Dept: ELECTROPHYSIOLOGY | Facility: CLINIC | Age: 84
End: 2019-10-17
Payer: MEDICARE

## 2019-10-17 VITALS
HEART RATE: 72 BPM | DIASTOLIC BLOOD PRESSURE: 70 MMHG | OXYGEN SATURATION: 97 % | BODY MASS INDEX: 24.63 KG/M2 | SYSTOLIC BLOOD PRESSURE: 147 MMHG | WEIGHT: 139 LBS | HEIGHT: 63 IN

## 2019-10-17 PROCEDURE — 93280 PM DEVICE PROGR EVAL DUAL: CPT

## 2019-10-21 ENCOUNTER — LABORATORY RESULT (OUTPATIENT)
Age: 84
End: 2019-10-21

## 2019-11-18 ENCOUNTER — APPOINTMENT (OUTPATIENT)
Dept: INTERNAL MEDICINE | Facility: CLINIC | Age: 84
End: 2019-11-18

## 2019-11-21 ENCOUNTER — LABORATORY RESULT (OUTPATIENT)
Age: 84
End: 2019-11-21

## 2019-12-19 ENCOUNTER — LABORATORY RESULT (OUTPATIENT)
Age: 84
End: 2019-12-19

## 2020-01-17 ENCOUNTER — LABORATORY RESULT (OUTPATIENT)
Age: 85
End: 2020-01-17

## 2020-02-13 ENCOUNTER — LABORATORY RESULT (OUTPATIENT)
Age: 85
End: 2020-02-13

## 2020-03-12 ENCOUNTER — LABORATORY RESULT (OUTPATIENT)
Age: 85
End: 2020-03-12

## 2020-04-09 ENCOUNTER — LABORATORY RESULT (OUTPATIENT)
Age: 85
End: 2020-04-09

## 2020-05-07 ENCOUNTER — LABORATORY RESULT (OUTPATIENT)
Age: 85
End: 2020-05-07

## 2020-06-04 DIAGNOSIS — Z11.59 ENCOUNTER FOR SCREENING FOR OTHER VIRAL DISEASES: ICD-10-CM

## 2020-06-05 ENCOUNTER — LABORATORY RESULT (OUTPATIENT)
Age: 85
End: 2020-06-05

## 2020-06-07 LAB
25(OH)D3 SERPL-MCNC: 72.1 NG/ML
ALBUMIN SERPL ELPH-MCNC: 4.1 G/DL
ALP BLD-CCNC: 106 U/L
ALT SERPL-CCNC: 25 U/L
ANION GAP SERPL CALC-SCNC: 22 MMOL/L
AST SERPL-CCNC: 29 U/L
BASOPHILS # BLD AUTO: 0.04 K/UL
BASOPHILS NFR BLD AUTO: 0.6 %
BILIRUB SERPL-MCNC: 0.4 MG/DL
BUN SERPL-MCNC: 71 MG/DL
CALCIUM SERPL-MCNC: 9.9 MG/DL
CHLORIDE SERPL-SCNC: 104 MMOL/L
CHOLEST SERPL-MCNC: 155 MG/DL
CHOLEST/HDLC SERPL: 3.9 RATIO
CO2 SERPL-SCNC: 17 MMOL/L
CREAT SERPL-MCNC: 1.87 MG/DL
EOSINOPHIL # BLD AUTO: 0.11 K/UL
EOSINOPHIL NFR BLD AUTO: 1.6 %
ESTIMATED AVERAGE GLUCOSE: 108 MG/DL
GLUCOSE SERPL-MCNC: 70 MG/DL
HBA1C MFR BLD HPLC: 5.4 %
HCT VFR BLD CALC: 37.9 %
HDLC SERPL-MCNC: 40 MG/DL
HGB BLD-MCNC: 12.3 G/DL
IMM GRANULOCYTES NFR BLD AUTO: 0.1 %
INR PPP: 1.97 RATIO
LDLC SERPL CALC-MCNC: 90 MG/DL
LYMPHOCYTES # BLD AUTO: 2.18 K/UL
LYMPHOCYTES NFR BLD AUTO: 32.1 %
MAN DIFF?: NORMAL
MCHC RBC-ENTMCNC: 31.5 PG
MCHC RBC-ENTMCNC: 32.5 GM/DL
MCV RBC AUTO: 96.9 FL
MONOCYTES # BLD AUTO: 0.66 K/UL
MONOCYTES NFR BLD AUTO: 9.7 %
NEUTROPHILS # BLD AUTO: 3.8 K/UL
NEUTROPHILS NFR BLD AUTO: 55.9 %
PLATELET # BLD AUTO: 216 K/UL
POTASSIUM SERPL-SCNC: 4 MMOL/L
PROT SERPL-MCNC: 6.6 G/DL
PT BLD: 22.7 SEC
RBC # BLD: 3.91 M/UL
RBC # FLD: 14.8 %
SARS-COV-2 IGG SERPL IA-ACNC: 0.1 INDEX
SARS-COV-2 IGG SERPL QL IA: NEGATIVE
SODIUM SERPL-SCNC: 142 MMOL/L
T4 FREE SERPL-MCNC: 1.5 NG/DL
TRIGL SERPL-MCNC: 122 MG/DL
TSH SERPL-ACNC: 2.52 UIU/ML
WBC # FLD AUTO: 6.8 K/UL

## 2020-06-25 ENCOUNTER — LABORATORY RESULT (OUTPATIENT)
Age: 85
End: 2020-06-25

## 2020-06-30 ENCOUNTER — RX RENEWAL (OUTPATIENT)
Age: 85
End: 2020-06-30

## 2020-07-02 ENCOUNTER — LABORATORY RESULT (OUTPATIENT)
Age: 85
End: 2020-07-02

## 2020-07-10 ENCOUNTER — APPOINTMENT (OUTPATIENT)
Dept: INTERNAL MEDICINE | Facility: CLINIC | Age: 85
End: 2020-07-10
Payer: MEDICARE

## 2020-07-10 PROCEDURE — G0439: CPT

## 2020-07-10 NOTE — HEALTH RISK ASSESSMENT
[Good] : ~his/her~ current health as good [Yes] : Yes [Monthly or less (1 pt)] : Monthly or less (1 point) [No falls in past year] : Patient reported no falls in the past year [Never (0 pts)] : Never (0 points) [0] : 1) Little interest or pleasure doing things: Not at all (0) [None] : None [Retired] : retired [High School] : high school [With Family] : lives with family [] :  [Feels Safe at Home] : Feels safe at home [Fully functional (using the telephone, shopping, preparing meals, housekeeping, doing laundry, using] : Fully functional and needs no help or supervision to perform IADLs (using the telephone, shopping, preparing meals, housekeeping, doing laundry, using transportation, managing medications and managing finances) [Fully functional (bathing, dressing, toileting, transferring, walking, feeding)] : Fully functional (bathing, dressing, toileting, transferring, walking, feeding) [Smoke Detector] : smoke detector [Seat Belt] :  uses seat belt [Carbon Monoxide Detector] : carbon monoxide detector [] : No [[id number]] : 0 [Language] : denies difficulty with language [Change in mental status noted] : No change in mental status noted [Behavior] : denies difficulty with behavior [Handling Complex Tasks] : denies difficulty handling complex tasks [Reasoning] : denies difficulty with reasoning [Learning/Retaining New Information] : denies difficulty learning/retaining new information [Reports changes in hearing] : Reports no changes in hearing [Reports changes in vision] : Reports no changes in vision [Spatial Ability and Orientation] : denies difficulty with spatial ability and orientation [Guns at Home] : no guns at home [Reports changes in dental health] : Reports no changes in dental health

## 2020-07-10 NOTE — REVIEW OF SYSTEMS
[Constipation] : constipation [Muscle Pain] : muscle pain [Joint Pain] : joint pain [Negative] : Genitourinary [Vomiting] : no vomiting

## 2020-07-10 NOTE — PLAN
[FreeTextEntry1] : Annual exam\par Had all vaccines\par aged out of other health maintenance\par \par bp good\par renal disease being investigated\par inr good\par afib controlled

## 2020-07-10 NOTE — REASON FOR VISIT
[Annual Wellness Visit] : an annual wellness visit [Home] : at home, [unfilled] , at the time of the visit. [Medical Office: ([facility])___] : at the medical office located in  [FreeTextEntry1] : Annual Wellness

## 2020-07-10 NOTE — HISTORY OF PRESENT ILLNESS
[de-identified] : Oral consent given\par Annual exam\par had flu and pneumonia vaccine\par Aged out of other health maintenance\par \par high bp good on 1/.2 dose of cadesartan\par renal issue\par seeing Dr Christian\par Last blood test some improvement\par gi issue better with less fiber\par on warfarin with good inr for afib

## 2020-07-14 ENCOUNTER — APPOINTMENT (OUTPATIENT)
Dept: INTERNAL MEDICINE | Facility: CLINIC | Age: 85
End: 2020-07-14

## 2020-07-27 ENCOUNTER — OUTPATIENT (OUTPATIENT)
Dept: OUTPATIENT SERVICES | Facility: HOSPITAL | Age: 85
LOS: 1 days | End: 2020-07-27
Payer: MEDICARE

## 2020-07-27 ENCOUNTER — APPOINTMENT (OUTPATIENT)
Dept: ULTRASOUND IMAGING | Facility: IMAGING CENTER | Age: 85
End: 2020-07-27
Payer: MEDICARE

## 2020-07-27 DIAGNOSIS — Z00.8 ENCOUNTER FOR OTHER GENERAL EXAMINATION: ICD-10-CM

## 2020-07-27 PROCEDURE — 93975 VASCULAR STUDY: CPT

## 2020-07-27 PROCEDURE — 93975 VASCULAR STUDY: CPT | Mod: 26

## 2020-08-03 ENCOUNTER — LABORATORY RESULT (OUTPATIENT)
Age: 85
End: 2020-08-03

## 2020-08-13 ENCOUNTER — APPOINTMENT (OUTPATIENT)
Dept: ELECTROPHYSIOLOGY | Facility: CLINIC | Age: 85
End: 2020-08-13
Payer: MEDICARE

## 2020-08-13 ENCOUNTER — NON-APPOINTMENT (OUTPATIENT)
Age: 85
End: 2020-08-13

## 2020-08-13 VITALS
DIASTOLIC BLOOD PRESSURE: 75 MMHG | WEIGHT: 133 LBS | SYSTOLIC BLOOD PRESSURE: 156 MMHG | HEART RATE: 70 BPM | OXYGEN SATURATION: 98 % | HEIGHT: 63 IN | BODY MASS INDEX: 23.57 KG/M2

## 2020-08-13 PROCEDURE — 93280 PM DEVICE PROGR EVAL DUAL: CPT

## 2020-08-31 ENCOUNTER — LABORATORY RESULT (OUTPATIENT)
Age: 85
End: 2020-08-31

## 2020-09-24 ENCOUNTER — LABORATORY RESULT (OUTPATIENT)
Age: 85
End: 2020-09-24

## 2020-10-22 ENCOUNTER — LABORATORY RESULT (OUTPATIENT)
Age: 85
End: 2020-10-22

## 2020-10-27 ENCOUNTER — NON-APPOINTMENT (OUTPATIENT)
Age: 85
End: 2020-10-27

## 2020-10-27 ENCOUNTER — EMERGENCY (EMERGENCY)
Facility: HOSPITAL | Age: 85
LOS: 1 days | Discharge: HOME | End: 2020-10-27
Attending: STUDENT IN AN ORGANIZED HEALTH CARE EDUCATION/TRAINING PROGRAM
Payer: MEDICARE

## 2020-10-27 VITALS
HEART RATE: 76 BPM | TEMPERATURE: 98 F | RESPIRATION RATE: 18 BRPM | OXYGEN SATURATION: 97 % | DIASTOLIC BLOOD PRESSURE: 59 MMHG | SYSTOLIC BLOOD PRESSURE: 139 MMHG

## 2020-10-27 VITALS
RESPIRATION RATE: 18 BRPM | OXYGEN SATURATION: 98 % | HEART RATE: 91 BPM | HEIGHT: 64 IN | DIASTOLIC BLOOD PRESSURE: 74 MMHG | TEMPERATURE: 97 F | WEIGHT: 132.94 LBS | SYSTOLIC BLOOD PRESSURE: 135 MMHG

## 2020-10-27 LAB
ALBUMIN SERPL ELPH-MCNC: 4.6 G/DL — SIGNIFICANT CHANGE UP (ref 3.3–5)
ALP SERPL-CCNC: 133 U/L — HIGH (ref 40–120)
ALT FLD-CCNC: 36 U/L — SIGNIFICANT CHANGE UP (ref 10–45)
ANION GAP SERPL CALC-SCNC: 17 MMOL/L — SIGNIFICANT CHANGE UP (ref 5–17)
APPEARANCE UR: CLEAR — SIGNIFICANT CHANGE UP
AST SERPL-CCNC: 33 U/L — SIGNIFICANT CHANGE UP (ref 10–40)
BACTERIA # UR AUTO: NEGATIVE — SIGNIFICANT CHANGE UP
BASE EXCESS BLDV CALC-SCNC: -0.3 MMOL/L — SIGNIFICANT CHANGE UP (ref -2–2)
BASOPHILS # BLD AUTO: 0.05 K/UL — SIGNIFICANT CHANGE UP (ref 0–0.2)
BASOPHILS NFR BLD AUTO: 0.6 % — SIGNIFICANT CHANGE UP (ref 0–2)
BILIRUB SERPL-MCNC: 0.4 MG/DL — SIGNIFICANT CHANGE UP (ref 0.2–1.2)
BILIRUB UR-MCNC: NEGATIVE — SIGNIFICANT CHANGE UP
BUN SERPL-MCNC: 56 MG/DL — HIGH (ref 7–23)
CA-I SERPL-SCNC: 1.24 MMOL/L — SIGNIFICANT CHANGE UP (ref 1.12–1.3)
CALCIUM SERPL-MCNC: 10.7 MG/DL — HIGH (ref 8.4–10.5)
CHLORIDE BLDV-SCNC: 108 MMOL/L — SIGNIFICANT CHANGE UP (ref 96–108)
CHLORIDE SERPL-SCNC: 103 MMOL/L — SIGNIFICANT CHANGE UP (ref 96–108)
CO2 BLDV-SCNC: 26 MMOL/L — SIGNIFICANT CHANGE UP (ref 22–30)
CO2 SERPL-SCNC: 20 MMOL/L — LOW (ref 22–31)
COLOR SPEC: SIGNIFICANT CHANGE UP
CREAT SERPL-MCNC: 1.71 MG/DL — HIGH (ref 0.5–1.3)
DIFF PNL FLD: NEGATIVE — SIGNIFICANT CHANGE UP
EOSINOPHIL # BLD AUTO: 0.14 K/UL — SIGNIFICANT CHANGE UP (ref 0–0.5)
EOSINOPHIL NFR BLD AUTO: 1.8 % — SIGNIFICANT CHANGE UP (ref 0–6)
EPI CELLS # UR: 0 /HPF — SIGNIFICANT CHANGE UP
GAS PNL BLDV: 137 MMOL/L — SIGNIFICANT CHANGE UP (ref 135–145)
GAS PNL BLDV: SIGNIFICANT CHANGE UP
GAS PNL BLDV: SIGNIFICANT CHANGE UP
GLUCOSE BLDV-MCNC: 132 MG/DL — HIGH (ref 70–99)
GLUCOSE SERPL-MCNC: 130 MG/DL — HIGH (ref 70–99)
GLUCOSE UR QL: NEGATIVE — SIGNIFICANT CHANGE UP
HCO3 BLDV-SCNC: 25 MMOL/L — SIGNIFICANT CHANGE UP (ref 21–29)
HCT VFR BLD CALC: 41.6 % — SIGNIFICANT CHANGE UP (ref 34.5–45)
HCT VFR BLDA CALC: 41 % — SIGNIFICANT CHANGE UP (ref 39–50)
HGB BLD CALC-MCNC: 13.3 G/DL — SIGNIFICANT CHANGE UP (ref 11.5–15.5)
HGB BLD-MCNC: 14.2 G/DL — SIGNIFICANT CHANGE UP (ref 11.5–15.5)
HYALINE CASTS # UR AUTO: 1 /LPF — SIGNIFICANT CHANGE UP (ref 0–2)
IMM GRANULOCYTES NFR BLD AUTO: 0.3 % — SIGNIFICANT CHANGE UP (ref 0–1.5)
KETONES UR-MCNC: NEGATIVE — SIGNIFICANT CHANGE UP
LACTATE BLDV-MCNC: 1.4 MMOL/L — SIGNIFICANT CHANGE UP (ref 0.7–2)
LEUKOCYTE ESTERASE UR-ACNC: ABNORMAL
LIDOCAIN IGE QN: 69 U/L — HIGH (ref 7–60)
LYMPHOCYTES # BLD AUTO: 2.91 K/UL — SIGNIFICANT CHANGE UP (ref 1–3.3)
LYMPHOCYTES # BLD AUTO: 36.6 % — SIGNIFICANT CHANGE UP (ref 13–44)
MCHC RBC-ENTMCNC: 31.6 PG — SIGNIFICANT CHANGE UP (ref 27–34)
MCHC RBC-ENTMCNC: 34.1 GM/DL — SIGNIFICANT CHANGE UP (ref 32–36)
MCV RBC AUTO: 92.7 FL — SIGNIFICANT CHANGE UP (ref 80–100)
MONOCYTES # BLD AUTO: 0.87 K/UL — SIGNIFICANT CHANGE UP (ref 0–0.9)
MONOCYTES NFR BLD AUTO: 11 % — SIGNIFICANT CHANGE UP (ref 2–14)
NEUTROPHILS # BLD AUTO: 3.95 K/UL — SIGNIFICANT CHANGE UP (ref 1.8–7.4)
NEUTROPHILS NFR BLD AUTO: 49.7 % — SIGNIFICANT CHANGE UP (ref 43–77)
NITRITE UR-MCNC: NEGATIVE — SIGNIFICANT CHANGE UP
NRBC # BLD: 0 /100 WBCS — SIGNIFICANT CHANGE UP (ref 0–0)
PCO2 BLDV: 44 MMHG — SIGNIFICANT CHANGE UP (ref 35–50)
PH BLDV: 7.37 — SIGNIFICANT CHANGE UP (ref 7.35–7.45)
PH UR: 5.5 — SIGNIFICANT CHANGE UP (ref 5–8)
PLATELET # BLD AUTO: 242 K/UL — SIGNIFICANT CHANGE UP (ref 150–400)
PO2 BLDV: 25 MMHG — SIGNIFICANT CHANGE UP (ref 25–45)
POTASSIUM BLDV-SCNC: 3.6 MMOL/L — SIGNIFICANT CHANGE UP (ref 3.5–5.3)
POTASSIUM SERPL-MCNC: 3.6 MMOL/L — SIGNIFICANT CHANGE UP (ref 3.5–5.3)
POTASSIUM SERPL-SCNC: 3.6 MMOL/L — SIGNIFICANT CHANGE UP (ref 3.5–5.3)
PROT SERPL-MCNC: 7.7 G/DL — SIGNIFICANT CHANGE UP (ref 6–8.3)
PROT UR-MCNC: SIGNIFICANT CHANGE UP
RBC # BLD: 4.49 M/UL — SIGNIFICANT CHANGE UP (ref 3.8–5.2)
RBC # FLD: 13.5 % — SIGNIFICANT CHANGE UP (ref 10.3–14.5)
RBC CASTS # UR COMP ASSIST: 1 /HPF — SIGNIFICANT CHANGE UP (ref 0–4)
SAO2 % BLDV: 45 % — LOW (ref 67–88)
SODIUM SERPL-SCNC: 140 MMOL/L — SIGNIFICANT CHANGE UP (ref 135–145)
SP GR SPEC: 1.01 — SIGNIFICANT CHANGE UP (ref 1.01–1.02)
TROPONIN T, HIGH SENSITIVITY RESULT: 19 NG/L — SIGNIFICANT CHANGE UP (ref 0–51)
TROPONIN T, HIGH SENSITIVITY RESULT: 22 NG/L — SIGNIFICANT CHANGE UP (ref 0–51)
UROBILINOGEN FLD QL: NEGATIVE — SIGNIFICANT CHANGE UP
WBC # BLD: 7.94 K/UL — SIGNIFICANT CHANGE UP (ref 3.8–10.5)
WBC # FLD AUTO: 7.94 K/UL — SIGNIFICANT CHANGE UP (ref 3.8–10.5)
WBC UR QL: 2 /HPF — SIGNIFICANT CHANGE UP (ref 0–5)

## 2020-10-27 PROCEDURE — 76700 US EXAM ABDOM COMPLETE: CPT

## 2020-10-27 PROCEDURE — 87086 URINE CULTURE/COLONY COUNT: CPT

## 2020-10-27 PROCEDURE — 82435 ASSAY OF BLOOD CHLORIDE: CPT

## 2020-10-27 PROCEDURE — 83690 ASSAY OF LIPASE: CPT

## 2020-10-27 PROCEDURE — 85018 HEMOGLOBIN: CPT

## 2020-10-27 PROCEDURE — 96374 THER/PROPH/DIAG INJ IV PUSH: CPT

## 2020-10-27 PROCEDURE — 74176 CT ABD & PELVIS W/O CONTRAST: CPT | Mod: 26

## 2020-10-27 PROCEDURE — 99285 EMERGENCY DEPT VISIT HI MDM: CPT | Mod: GC

## 2020-10-27 PROCEDURE — 81001 URINALYSIS AUTO W/SCOPE: CPT

## 2020-10-27 PROCEDURE — 99284 EMERGENCY DEPT VISIT MOD MDM: CPT | Mod: 25

## 2020-10-27 PROCEDURE — 96375 TX/PRO/DX INJ NEW DRUG ADDON: CPT

## 2020-10-27 PROCEDURE — 71045 X-RAY EXAM CHEST 1 VIEW: CPT | Mod: 26

## 2020-10-27 PROCEDURE — 83605 ASSAY OF LACTIC ACID: CPT

## 2020-10-27 PROCEDURE — 76700 US EXAM ABDOM COMPLETE: CPT | Mod: 26

## 2020-10-27 PROCEDURE — 82803 BLOOD GASES ANY COMBINATION: CPT

## 2020-10-27 PROCEDURE — 74176 CT ABD & PELVIS W/O CONTRAST: CPT

## 2020-10-27 PROCEDURE — 84295 ASSAY OF SERUM SODIUM: CPT

## 2020-10-27 PROCEDURE — 71045 X-RAY EXAM CHEST 1 VIEW: CPT

## 2020-10-27 PROCEDURE — 93005 ELECTROCARDIOGRAM TRACING: CPT

## 2020-10-27 PROCEDURE — 80053 COMPREHEN METABOLIC PANEL: CPT

## 2020-10-27 PROCEDURE — 85014 HEMATOCRIT: CPT

## 2020-10-27 PROCEDURE — 84132 ASSAY OF SERUM POTASSIUM: CPT

## 2020-10-27 PROCEDURE — 93010 ELECTROCARDIOGRAM REPORT: CPT | Mod: GC

## 2020-10-27 PROCEDURE — 82330 ASSAY OF CALCIUM: CPT

## 2020-10-27 PROCEDURE — 85025 COMPLETE CBC W/AUTO DIFF WBC: CPT

## 2020-10-27 PROCEDURE — 71250 CT THORAX DX C-: CPT

## 2020-10-27 PROCEDURE — 84484 ASSAY OF TROPONIN QUANT: CPT

## 2020-10-27 PROCEDURE — 71250 CT THORAX DX C-: CPT | Mod: 26

## 2020-10-27 PROCEDURE — 82947 ASSAY GLUCOSE BLOOD QUANT: CPT

## 2020-10-27 RX ORDER — PANTOPRAZOLE SODIUM 20 MG/1
40 TABLET, DELAYED RELEASE ORAL ONCE
Refills: 0 | Status: COMPLETED | OUTPATIENT
Start: 2020-10-27 | End: 2020-10-27

## 2020-10-27 RX ORDER — FAMOTIDINE 10 MG/ML
20 INJECTION INTRAVENOUS ONCE
Refills: 0 | Status: COMPLETED | OUTPATIENT
Start: 2020-10-27 | End: 2020-10-27

## 2020-10-27 RX ADMIN — PANTOPRAZOLE SODIUM 40 MILLIGRAM(S): 20 TABLET, DELAYED RELEASE ORAL at 04:38

## 2020-10-27 RX ADMIN — FAMOTIDINE 20 MILLIGRAM(S): 10 INJECTION INTRAVENOUS at 04:37

## 2020-10-27 RX ADMIN — Medication 30 MILLILITER(S): at 04:37

## 2020-10-27 NOTE — ED PROVIDER NOTE - ATTENDING CONTRIBUTION TO CARE
I have personally performed a face to face medical and diagnostic evaluation of the patient. I have discussed with and reviewed the Resident's note and agree with the History, ROS, Physical Exam and MDM unless otherwise indicated. A brief summary of my personal evaluation and impression can be found below.    89F hx of HTN HLD afib presents with a cc of abdominal pain and discomfort intermittent over x1 week  no urinary sx, no fevers, pain worse after eating. No chest pain no SOB. mild nausea, no vomiting. No LE swelling. Denies /v/f/c/cp/sob. Denies headache, syncope, lightheadedness, dizziness. Denies chest palpitations, Denies edema. Denies dysuria, hematuria, BRBPR, tarry stools, diarrhea, constipation. No meds prior to ED arrival, called son at 3am not feeling well prompting ED visit. Had episode where felt was "racing" as if was "in afib again" just prior to ED arrival.     All other ROS negative, except as above and as per HPI and ROS section.    VITALS: Initial triage and subsequent vitals have been reviewed by me.  GEN APPEARANCE: WDWN, alert, non-toxic, NAD  HEAD: Atraumatic.  EYES: PERRLa, EOMI, vision grossly intact.   NECK: Supple  CV: RRR, S1S2, no c/r/m/g. Cap refill <2 seconds. No bruits.   LUNGS: CTAB. No abnormal breath sounds.  ABDOMEN: mild upper abdominal/epigastric ttp   MSK/EXT: No spinal or extremity point tenderness. No CVA ttp. Pelvis stable. No peripheral edema.  NEURO: Alert, follows commands. Speech normal. Sensation and motor normal x4 extremities.   SKIN: Warm, dry and intact. No rash.  PSYCH: Appropriate    Plan/MDM: 89F HTN HLD afib on coumadin presents with generalized upper abdominal pain since 3am, exam vss non toxic mild upper abdominal ttp on exam ddx c/f yelena v panc vs gerd vs less likely acs/ami/dissection, will check ekg cxr labs ct, ua, pain control, reassess.

## 2020-10-27 NOTE — ED PROVIDER NOTE - NS ED ROS FT
Constitution: No Fever or chills  HEENT: No cough, No Discharge, No Rhinorrhea, No URI symptoms  Cardio: No Chest pain, No Palpitations, No Dyspnea  Resp: No SOB, No Wheezing  GI: (+) abdominal pain, No Nausea, No Vomiting, No Constipation, No Diarrhea  : No burning upon urination, trouble urinating, no foul odor from urine  MSK: No Numbness, No Tingling, No Weakness  Neuro: No Headache, No changes to Vision, No changes to Hearing, Normal Gait

## 2020-10-27 NOTE — ED PROVIDER NOTE - OBJECTIVE STATEMENT
89 female, Hx: HTN, HLD, Afib (Coumadin) - presents with generalized abdominal pain/discomfort at 3 AM - denies any fevers, chills, CP, SOB, nausea, vomiting, diarrhea, constipation, bloody stools, dysuric symptoms.

## 2020-10-27 NOTE — ED PROVIDER NOTE - PHYSICAL EXAMINATION
GEN - NAD; well appearing; A+Ox3   HEAD - NC/AT, No visible Ecchymosis, No Abrasions, No Lacerations/Skin Tears     EYES - EOMI, no conjunctival pallor, no scleral icterus  PULM - CTA B/L,  symmetric breath sounds  COR -  RRR, S1 S2, no murmurs  ABD - NT/ND, soft, no guarding, no rebound, no masses    BACK - no CVA tenderness  EXTREMS - 2+ Pulses B/L UE and LE; 0+ edema, no gross deformity, warm and well perfused, no calf tenderness/swelling/erythema    NEUROLOGIC - alert, AOx 3 moving all 4 ext

## 2020-10-27 NOTE — ED ADULT NURSE NOTE - OBJECTIVE STATEMENT
88 YO female PMHX HTN, HLD, A-fib on coumadin, pacemaker placed, c/o abdominal pain tonight. Son at bedside reports that he received a call from patient in the morning, which is very out of the ordinary for her as she does not complain. Patient reports heaviness at epigastric area and diffuse, intermittent abdominal pain, no n/v/d. Patient currently A&OX3, abdomen soft, non-tender, mild distention noted, last BM yesterday, non-black or bloody. patient placed on CM, A-fib in 90's, son reports that she "is in and out of A-fib when she is out of sorts". denies chest pain, SOB, HA, N/V/D, fever/chills, urinary symptoms, hematuria, weakness, dizziness, numbness, tinging. Peripheral pulses present b/l. Skin warm, dry and pink. Pt placed in position of comfort. Pt educated on call bell system and provided call bell. Bed in lowest position, wheels locked, appropriate side rails raised. Pt denies needs at this time.

## 2020-10-27 NOTE — ED PROVIDER NOTE - PROGRESS NOTE DETAILS
Samuel PGY-3:  Signout from Radha PGY2:  89yo F here w/ RUQ pain awaiting RUQ US and repeat trop, if wnl can d/c Samuel PGY-3:  Pt feeling better. Trop stable, RUQ only with cholelithiasis, Pt states Cr is same as baseline. Will give f/u w/ surgery, pt states has own GI to f/u w/ for currant sx, I have given the pt strict return and follow up precautions. The patient has been provided with a copy of all pertinent results. The patient has been informed of all concerning signs and symptoms to return to Emergency Department, the necessity to follow up with PMD/Clinic/follow up provided within 2-3 days was explained, and the patient reports understanding of above with capacity and insight.

## 2020-10-27 NOTE — ED PROVIDER NOTE - NSFOLLOWUPINSTRUCTIONS_ED_ALL_ED_FT
(1) Follow up with your primary care physician as discussed. In addition, we did not find evidence of a life threatening illness on your testing here today, but listed below are the specialists that will be necessary to see as an outpatient to continue the workup.  Please call the numbers listed below or [phone] to set up the necessary appointments.  (2) Immediately seek care at your nearest emergency room if your worsen, persist, or do not resolve   (3) Take Tylenol (up to 1000mg or 1 g) up to every 6 hours as needed for pain. You can also try maalox or pepcid.

## 2020-10-27 NOTE — ED PROVIDER NOTE - PATIENT PORTAL LINK FT
You can access the FollowMyHealth Patient Portal offered by [facility] by registering at the following website: [email or web address]. By joining [employer]’s FollowMyHealth portal, you will also be able to view your health information using other applications (apps) compatible with our system.

## 2020-10-27 NOTE — ED PROVIDER NOTE - CLINICAL SUMMARY MEDICAL DECISION MAKING FREE TEXT BOX
89 female, Hx: HTN, HLD, Afib (Coumadin) - presents with generalized abdominal pain/discomfort at 3 AM - denies any fevers, chills, CP, SOB, nausea, vomiting, diarrhea, constipation, bloody stools, dysuric symptoms. Exam, presentation, and history concerning for gastritis vs. dyspepsia vs. Atypical ACS vs. pancreatitis (less likely); minimal concern for mesenteric ischemia, colitis, cholecystitis, appendicitis. Plan: CBC, CMP, VBG, CTAP, UA, UCx, CXR, Trop. Re-evaluate. Son is ED Tech at [facility] ED - accompanied pt.

## 2020-10-27 NOTE — ED ADULT NURSE REASSESSMENT NOTE - NS ED NURSE REASSESS COMMENT FT1
Pt resting comfortably in bed. VSS. Pt awaiting Abd US. Son at bedside. Call bell at bedside. Will continue to monitor.

## 2020-10-27 NOTE — ED ADULT TRIAGE NOTE - NSTRIAGECARE_GEN_A_ER
Face Mask
Attending Corie Murguia: 70 y/o female presenting with chest pain and near syncopal event. upon arrival symptoms resolved. no sob or pleuritic pain to suggest PE. no dizziness, headache, or bruit on exam to suggest cervical dissection. no back pain or chest discomfort currently making dissection less likely.  EKG without evidence of acute ischemia. pt placed on monitor, will obtain labs, likely delta trop with close cardiology follow up

## 2020-10-27 NOTE — ED PROVIDER NOTE - NSFOLLOWUPCLINICS_GEN_ALL_ED_FT
[facility]  General Surgery  [address] [place] [address] - [address] [place] [address]  Phone: [phone]  Fax:   Follow Up Time: 4-6 Days

## 2020-10-27 NOTE — ED ADULT NURSE NOTE - ED CARDIAC HEART SOUNDS
.GYN EXAM      HPI    Amber is a 34 year old female who comes in for a complete physical and gynecological exam.    LMP:      1/30/18  # Days of Cycle:    28-30 days  Duration of menses:   6 days  Hx of Abnormal PAP:    [x] No[] Yes:     Hx of Colposcopy:     [x] No  [] Yes:  Mammogram:     [x] None    [] 1yr  [] 2yr       []Other:  Breast Self Exams:     [] No [x] Yes:no abnormalities noted  Birth Control or HRT:      [x] No [] Yes: in past used oral contraception (2002 -2006), would like to restart oral contraception  Vaginal discharge:     [x] No [] Yes:  STD Prevention and Screening:  [x] No [] Yes:    Calcium (1000-1500mg/day):   [] No  [x] Yes:dietary  Diet:       low carb diet; more vegetable  Exercise:      [] No  [x] Yes:yoga 45-60 minutes 2x/week      I have reviewed the patient's medications and allergies, past medical, surgical, social and family history, updating these as appropriate.  See Histories section of the EMR for a display of this information.    REVIEW OF SYSTEMS    Constitutional: Denies fatigue  HENT: Denies headache, earache, nasal congestion, or sore throat. Occasional runny nose.  Respiratory: Denies shortness of breath, cough, or wheezing.  Cardiovascular:  Denies chest pain, palpitations, or edema.  GI:  Denies abdominal pain, heartburn, nausea or vomiting, constipation, blood in stool. Reports the week before her last menses having diarrhea.  :  Denies dysuria, hematuria, or other difficulties with urination.  Musculoskeletal:  Denies back pain.   Integument:  Denies rash.  Neurologic:  Denies dizziness/lightheadedness and generalized weakness.      PHYSICAL EXAM  Vitals:  Blood pressure 110/80, pulse 76, height 5' 9\" (1.753 m), weight 75.2 kg, last menstrual period 01/30/2018.  Constitutional:  Well developed, well nourished, 34 year old,  female who is in no acute distress.   HEENT: Conjunctiva clear; non-icteric. Normocephalic; atraumatic. Bilateral nares without  congestion. Tympanic membranes are intact bilaterally with light reflex. Oropharynx is moist without exudate or lesions.  Neck is symmetric and supple. No thyromegaly is noted.   Respiratory:  Respirations are easy and nonlabored. Breath sounds are clear to auscultation throughout. No adventitious breath sounds were noted.   Cardiovascular:  Heart rate and rhythm are regular. Normal S1, S2. No murmurs, rubs, or gallops appreciated.  No peripheral edema noted.  GI:  Abdomen soft, nondistended, nontender, with active bowel sounds x4.   Pelvic:  Normal external genitalia. Normal vaginal vault. Small amount of white vaginal discharge noted. Cervix is pink without nabothian cyst at approximately 9:00. The uterus is not enlarged or tender. There are no adnexal masses or tenderness.  Integument:  Well hydrated, no rash   Neurologic:  Alert & oriented x 3. Cranial nerves 2-12 are grossly intact.   Psychiatric:  Mood and affect are pleasant and appropriate.       ASSESSMENT/PLAN    Amber was seen today for physical and contraception.    Diagnoses and all orders for this visit:    Well woman exam with routine gynecological exam    Screening for malignant neoplasm of cervix  -     THINPREP PAP TEST WITH HPV REGARDLESS    Vaginal discharge  -     WET MOUNT  -     SPECIMEN HANDLING    Oral contraceptive pill surveillance  -     levonorgestrel-ethinyl estradiol 0.1-20 MG-MCG per tablet; Take 1 tablet by mouth daily.   - start with next menses    - to use back-up contraception for first month of use (condoms)   - RTC in 1 month to recheck BP    Gastroesophageal reflux disease, esophagitis presence not specified   - controlled with diet   - when knowing eats food that will provoke heartburn uses ranitidine    Need for lipid screening  -     LIPID PANEL WITH REFLEX; Future    Medication management  -     BASIC METABOLIC PANEL; Future  -     CBC NO DIFFERENTIAL; Future  -     MAGNESIUM LEVEL; Future    Thyroid disorder  screening  -     THYROID STIMULATING HORMONE; Future    Other orders (refill)  -     fluticasone (FLONASE) 50 MCG/ACT nasal spray; Spray 2 sprays in each nostril daily.         normal S1, S2 heard

## 2020-10-28 LAB
CULTURE RESULTS: SIGNIFICANT CHANGE UP
SPECIMEN SOURCE: SIGNIFICANT CHANGE UP

## 2020-11-04 ENCOUNTER — APPOINTMENT (OUTPATIENT)
Dept: INTERNAL MEDICINE | Facility: CLINIC | Age: 85
End: 2020-11-04
Payer: MEDICARE

## 2020-11-04 VITALS
OXYGEN SATURATION: 96 % | SYSTOLIC BLOOD PRESSURE: 158 MMHG | HEIGHT: 63 IN | TEMPERATURE: 97.9 F | BODY MASS INDEX: 23.04 KG/M2 | DIASTOLIC BLOOD PRESSURE: 73 MMHG | HEART RATE: 80 BPM | WEIGHT: 130 LBS

## 2020-11-04 PROCEDURE — 99495 TRANSJ CARE MGMT MOD F2F 14D: CPT | Mod: 25

## 2020-11-04 NOTE — HISTORY OF PRESENT ILLNESS
[Post-hospitalization from ___ Hospital] : Post-hospitalization from [unfilled] Hospital [FreeTextEntry2] : Er oct 27  abdominal pain and gallbladder\par sono gallstone but no infl\par ct scan stone in neck\par alp phs 133 and lipase 66\par sugery schedule for next week\par on warfarin 10 mg per week\par Dr Callahan cardiology

## 2020-11-04 NOTE — HEALTH RISK ASSESSMENT
[No] : No [No falls in past year] : Patient reported no falls in the past year [0] : 2) Feeling down, depressed, or hopeless: Not at all (0) [[id number]] : 0

## 2020-11-04 NOTE — PHYSICAL EXAM
[Normal] : soft, non-tender, non-distended, no masses palpated, no HSM and normal bowel sounds [30344 - Moderate Complexity requires multiple possible diagnoses and/or the management options, moderate complexity of the medical data (tests, etc.) to be reviewed, and moderate risk of significant complications, morbidity, and/or mortality as well as co] : Moderate Complexity

## 2020-11-04 NOTE — PLAN
[FreeTextEntry1] : OK for GB surgery]\par Hold candesartan am of procedure\par bp good\par hold warfarin 4 days prior\par

## 2020-11-19 ENCOUNTER — LABORATORY RESULT (OUTPATIENT)
Age: 85
End: 2020-11-19

## 2020-12-08 ENCOUNTER — APPOINTMENT (OUTPATIENT)
Dept: ELECTROPHYSIOLOGY | Facility: CLINIC | Age: 85
End: 2020-12-08
Payer: MEDICARE

## 2020-12-08 PROCEDURE — 93296 REM INTERROG EVL PM/IDS: CPT

## 2020-12-08 PROCEDURE — 93294 REM INTERROG EVL PM/LDLS PM: CPT

## 2020-12-09 ENCOUNTER — NON-APPOINTMENT (OUTPATIENT)
Age: 85
End: 2020-12-09

## 2020-12-14 ENCOUNTER — APPOINTMENT (OUTPATIENT)
Dept: INTERNAL MEDICINE | Facility: CLINIC | Age: 85
End: 2020-12-14
Payer: MEDICARE

## 2020-12-14 VITALS
HEIGHT: 63 IN | OXYGEN SATURATION: 96 % | DIASTOLIC BLOOD PRESSURE: 74 MMHG | TEMPERATURE: 97.6 F | HEART RATE: 69 BPM | BODY MASS INDEX: 22.5 KG/M2 | WEIGHT: 127 LBS | SYSTOLIC BLOOD PRESSURE: 163 MMHG

## 2020-12-14 PROCEDURE — 99214 OFFICE O/P EST MOD 30 MIN: CPT

## 2020-12-14 NOTE — PHYSICAL EXAM
[Normal] : no respiratory distress, lungs were clear to auscultation bilaterally and no accessory muscle use [Normal Rate] : normal rate

## 2020-12-14 NOTE — PLAN
[FreeTextEntry1] : episodes from bp swings vs\par anxiety\par trial of celexa 10\par await repeat blood\par on warfarin 10 mg per week\par gb quiet on omeprazole\par

## 2020-12-14 NOTE — HISTORY OF PRESENT ILLNESS
[FreeTextEntry1] : episode of light headed [de-identified] : episodes of weakness\par has pacemaker with recorder\par no events at same time\par paf\par anxiety\par bp swings from 120 to 160\par lower cadesaetan combo

## 2020-12-16 ENCOUNTER — NON-APPOINTMENT (OUTPATIENT)
Age: 85
End: 2020-12-16

## 2020-12-16 ENCOUNTER — LABORATORY RESULT (OUTPATIENT)
Age: 85
End: 2020-12-16

## 2020-12-16 ENCOUNTER — TRANSCRIPTION ENCOUNTER (OUTPATIENT)
Age: 85
End: 2020-12-16

## 2020-12-22 LAB
INR PPP: 1.19 RATIO
PT BLD: 14 SEC

## 2020-12-31 ENCOUNTER — LABORATORY RESULT (OUTPATIENT)
Age: 85
End: 2020-12-31

## 2021-01-01 ENCOUNTER — NON-APPOINTMENT (OUTPATIENT)
Age: 86
End: 2021-01-01

## 2021-01-07 LAB
INR PPP: 2.36 RATIO
PT BLD: 27 SEC

## 2021-01-14 LAB
INR PPP: 3.09 RATIO
PT BLD: 34.9 SEC

## 2021-01-21 LAB
INR PPP: 2.81 RATIO
PT BLD: 31.8 SEC

## 2021-01-29 ENCOUNTER — NON-APPOINTMENT (OUTPATIENT)
Age: 86
End: 2021-01-29

## 2021-01-29 LAB
INR PPP: 3.56 RATIO
PT BLD: 39.9 SEC

## 2021-02-03 LAB
INR PPP: 3.63 RATIO
PT BLD: 40.6 SEC

## 2021-02-09 ENCOUNTER — LABORATORY RESULT (OUTPATIENT)
Age: 86
End: 2021-02-09

## 2021-02-11 LAB
INR PPP: 2.6 RATIO
PT BLD: 29.6 SEC

## 2021-02-17 LAB
INR PPP: 2.49 RATIO
PT BLD: 28.4 SEC

## 2021-02-25 ENCOUNTER — NON-APPOINTMENT (OUTPATIENT)
Age: 86
End: 2021-02-25

## 2021-02-25 LAB
INR PPP: 2.13 RATIO
PT BLD: 24.4 SEC

## 2021-02-26 DIAGNOSIS — R74.8 ABNORMAL LEVELS OF OTHER SERUM ENZYMES: ICD-10-CM

## 2021-03-04 LAB
ALBUMIN SERPL ELPH-MCNC: 3.9 G/DL
ALP BLD-CCNC: 129 U/L
ALT SERPL-CCNC: 46 U/L
ANION GAP SERPL CALC-SCNC: 15 MMOL/L
AST SERPL-CCNC: 36 U/L
BASOPHILS # BLD AUTO: 0.03 K/UL
BASOPHILS NFR BLD AUTO: 0.5 %
BILIRUB SERPL-MCNC: 0.2 MG/DL
BUN SERPL-MCNC: 51 MG/DL
CALCIUM SERPL-MCNC: 9.6 MG/DL
CHLORIDE SERPL-SCNC: 104 MMOL/L
CO2 SERPL-SCNC: 20 MMOL/L
CREAT SERPL-MCNC: 1.66 MG/DL
EOSINOPHIL # BLD AUTO: 0.1 K/UL
EOSINOPHIL NFR BLD AUTO: 1.8 %
GLUCOSE SERPL-MCNC: 109 MG/DL
HCT VFR BLD CALC: 37.2 %
HGB BLD-MCNC: 12 G/DL
IMM GRANULOCYTES NFR BLD AUTO: 0.2 %
INR PPP: 1.39 RATIO
LYMPHOCYTES # BLD AUTO: 1.68 K/UL
LYMPHOCYTES NFR BLD AUTO: 30.4 %
MAN DIFF?: NORMAL
MCHC RBC-ENTMCNC: 31.4 PG
MCHC RBC-ENTMCNC: 32.3 GM/DL
MCV RBC AUTO: 97.4 FL
MONOCYTES # BLD AUTO: 0.59 K/UL
MONOCYTES NFR BLD AUTO: 10.7 %
NEUTROPHILS # BLD AUTO: 3.11 K/UL
NEUTROPHILS NFR BLD AUTO: 56.4 %
PLATELET # BLD AUTO: 252 K/UL
POTASSIUM SERPL-SCNC: 5.2 MMOL/L
PROT SERPL-MCNC: 6.5 G/DL
PT BLD: 16.3 SEC
RBC # BLD: 3.82 M/UL
RBC # FLD: 14.9 %
SODIUM SERPL-SCNC: 139 MMOL/L
WBC # FLD AUTO: 5.52 K/UL

## 2021-03-10 LAB
INR PPP: 2.04 RATIO
PT BLD: 23.5 SEC

## 2021-03-23 DIAGNOSIS — R10.9 UNSPECIFIED ABDOMINAL PAIN: ICD-10-CM

## 2021-03-24 ENCOUNTER — NON-APPOINTMENT (OUTPATIENT)
Age: 86
End: 2021-03-24

## 2021-03-24 LAB
INR PPP: 2.52 RATIO
PT BLD: 28.5 SEC

## 2021-03-26 ENCOUNTER — NON-APPOINTMENT (OUTPATIENT)
Age: 86
End: 2021-03-26

## 2021-04-13 ENCOUNTER — NON-APPOINTMENT (OUTPATIENT)
Age: 86
End: 2021-04-13

## 2021-04-13 ENCOUNTER — APPOINTMENT (OUTPATIENT)
Dept: ELECTROPHYSIOLOGY | Facility: CLINIC | Age: 86
End: 2021-04-13
Payer: MEDICARE

## 2021-04-13 LAB
ALBUMIN SERPL ELPH-MCNC: 3.7 G/DL
ALP BLD-CCNC: 132 U/L
ALT SERPL-CCNC: 37 U/L
ANION GAP SERPL CALC-SCNC: 11 MMOL/L
AST SERPL-CCNC: 36 U/L
BASOPHILS # BLD AUTO: 0.02 K/UL
BASOPHILS NFR BLD AUTO: 0.4 %
BILIRUB SERPL-MCNC: <0.2 MG/DL
BUN SERPL-MCNC: 81 MG/DL
CALCIUM SERPL-MCNC: 10.1 MG/DL
CHLORIDE SERPL-SCNC: 107 MMOL/L
CO2 SERPL-SCNC: 19 MMOL/L
CREAT SERPL-MCNC: 1.66 MG/DL
EOSINOPHIL # BLD AUTO: 0.12 K/UL
EOSINOPHIL NFR BLD AUTO: 2.3 %
GLUCOSE SERPL-MCNC: 153 MG/DL
HCT VFR BLD CALC: 37.7 %
HGB BLD-MCNC: 11.9 G/DL
IMM GRANULOCYTES NFR BLD AUTO: 0.4 %
INR PPP: 2.93 RATIO
LYMPHOCYTES # BLD AUTO: 1.51 K/UL
LYMPHOCYTES NFR BLD AUTO: 28.7 %
MAN DIFF?: NORMAL
MCHC RBC-ENTMCNC: 30.4 PG
MCHC RBC-ENTMCNC: 31.6 GM/DL
MCV RBC AUTO: 96.2 FL
MONOCYTES # BLD AUTO: 0.51 K/UL
MONOCYTES NFR BLD AUTO: 9.7 %
NEUTROPHILS # BLD AUTO: 3.09 K/UL
NEUTROPHILS NFR BLD AUTO: 58.5 %
PLATELET # BLD AUTO: 224 K/UL
POTASSIUM SERPL-SCNC: 4.8 MMOL/L
PROT SERPL-MCNC: 6.3 G/DL
PT BLD: 32.9 SEC
RBC # BLD: 3.92 M/UL
RBC # FLD: 15.1 %
SODIUM SERPL-SCNC: 138 MMOL/L
WBC # FLD AUTO: 5.27 K/UL

## 2021-04-13 PROCEDURE — 93296 REM INTERROG EVL PM/IDS: CPT

## 2021-04-13 PROCEDURE — 93294 REM INTERROG EVL PM/LDLS PM: CPT

## 2021-04-29 ENCOUNTER — RX RENEWAL (OUTPATIENT)
Age: 86
End: 2021-04-29

## 2021-04-29 LAB
ALBUMIN SERPL ELPH-MCNC: 3.7 G/DL
ALP BLD-CCNC: 126 U/L
ALT SERPL-CCNC: 29 U/L
ANION GAP SERPL CALC-SCNC: 12 MMOL/L
AST SERPL-CCNC: 32 U/L
BILIRUB SERPL-MCNC: <0.2 MG/DL
BUN SERPL-MCNC: 72 MG/DL
CALCIUM SERPL-MCNC: 9.6 MG/DL
CHLORIDE SERPL-SCNC: 112 MMOL/L
CO2 SERPL-SCNC: 18 MMOL/L
CREAT SERPL-MCNC: 1.84 MG/DL
GLUCOSE SERPL-MCNC: 166 MG/DL
INR PPP: 3.62 RATIO
POTASSIUM SERPL-SCNC: 4.3 MMOL/L
PROT SERPL-MCNC: 6.5 G/DL
PT BLD: 40.2 SEC
SODIUM SERPL-SCNC: 142 MMOL/L

## 2021-04-29 RX ORDER — CANDESARTAN CILEXETIL AND HYDROCHLOROTHIAZIDE 32; 12.5 MG/1; MG/1
32-12.5 TABLET ORAL
Qty: 90 | Refills: 3 | Status: DISCONTINUED | COMMUNITY
Start: 2018-09-12 | End: 2021-04-29

## 2021-05-12 LAB
ALBUMIN SERPL ELPH-MCNC: 4.1 G/DL
ALP BLD-CCNC: 140 U/L
ALT SERPL-CCNC: 47 U/L
ANION GAP SERPL CALC-SCNC: 12 MMOL/L
AST SERPL-CCNC: 43 U/L
BILIRUB SERPL-MCNC: 0.2 MG/DL
BUN SERPL-MCNC: 51 MG/DL
CALCIUM SERPL-MCNC: 10.1 MG/DL
CHLORIDE SERPL-SCNC: 111 MMOL/L
CO2 SERPL-SCNC: 20 MMOL/L
CREAT SERPL-MCNC: 1.46 MG/DL
GLUCOSE SERPL-MCNC: 72 MG/DL
INR PPP: 2.22 RATIO
POTASSIUM SERPL-SCNC: 5 MMOL/L
PROT SERPL-MCNC: 7 G/DL
PT BLD: 25.2 SEC
SODIUM SERPL-SCNC: 143 MMOL/L

## 2021-05-17 ENCOUNTER — APPOINTMENT (OUTPATIENT)
Dept: GERIATRICS | Facility: CLINIC | Age: 86
End: 2021-05-17
Payer: MEDICARE

## 2021-05-17 VITALS
HEART RATE: 76 BPM | OXYGEN SATURATION: 97 % | DIASTOLIC BLOOD PRESSURE: 70 MMHG | WEIGHT: 127.5 LBS | BODY MASS INDEX: 22.59 KG/M2 | SYSTOLIC BLOOD PRESSURE: 156 MMHG | RESPIRATION RATE: 16 BRPM | HEIGHT: 63 IN | TEMPERATURE: 97.2 F

## 2021-05-17 DIAGNOSIS — K80.20 CALCULUS OF GALLBLADDER W/OUT CHOLECYSTITIS W/OUT OBSTRUCTION: ICD-10-CM

## 2021-05-17 PROCEDURE — 99205 OFFICE O/P NEW HI 60 MIN: CPT

## 2021-05-17 RX ORDER — AMOXICILLIN AND CLAVULANATE POTASSIUM 500; 125 MG/1; MG/1
500-125 TABLET, FILM COATED ORAL
Qty: 20 | Refills: 0 | Status: DISCONTINUED | COMMUNITY
Start: 2021-03-23 | End: 2021-05-17

## 2021-05-17 RX ORDER — NIFEDIPINE 90 MG/1
90 TABLET, EXTENDED RELEASE ORAL
Qty: 30 | Refills: 5 | Status: DISCONTINUED | COMMUNITY
Start: 2018-05-16 | End: 2021-05-17

## 2021-05-17 RX ORDER — CITALOPRAM HYDROBROMIDE 10 MG/1
10 TABLET, FILM COATED ORAL
Qty: 90 | Refills: 3 | Status: DISCONTINUED | COMMUNITY
Start: 2020-12-14 | End: 2021-05-17

## 2021-05-17 NOTE — REVIEW OF SYSTEMS
[Loss Of Hearing] : hearing loss [Negative] : Heme/Lymph [FreeTextEntry9] : arthritis of the hands or

## 2021-05-17 NOTE — HISTORY OF PRESENT ILLNESS
[No falls in past year] : Patient reported no falls in the past year [Fully functional (bathing, dressing, toileting, transferring, walking, feeding)] : Fully functional (bathing, dressing, toileting, transferring, walking, feeding) [Fully functional (using the telephone, shopping, preparing meals, housekeeping, doing laundry, using transportation,] : Fully functional and needs no help or supervision to perform IADLs (using the telephone, shopping, preparing meals, housekeeping, doing laundry, using transportation, managing medications and managing finances) [Canes] : idalia [Smoke Detector] : smoke detector [Carbon Monoxide Detector] : carbon monoxide detector [Grab Bars] : grab bars [Seat Belt] :  uses seat belt [0] : 2) Feeling down, depressed, or hopeless: Not at all [PHQ-2 Score ___] : PHQ-2 Score [unfilled] [Designated Healthcare Proxy] : Designated healthcare proxy [FreeTextEntry1] : Mrs. Pati Membreno is a 89-year-old woman presenting to [facility]. She is accompanied by her granddaughter Jessica who is an RN. The patient is functionally independent. She is  roughly 15 years and lives alone in a private home. She is able to manage her own household including cooking cleaning and laundry. She gave up driving recently and family takes her shopping. Roughly 2 months ago the patient was experiencing lower abdominal pain. She had an ER visit and was diagnosed with gallstones, also started on Prilosec 40 mg with resolution of pain and improvement in appetite. She is currently on half the dose. Coincidently the patient also experienced some episodes of dark stools which have resolved. She is on Coumadin long-term for PAF. Since being on the PPI, she has required low doses of Coumadin. [Guns at Home] : no guns at home [[facility]] : 14 [AdvancecareDate] : 5/17/21

## 2021-05-17 NOTE — PHYSICAL EXAM
[General Appearance - Alert] : alert [General Appearance - In No Acute Distress] : in no acute distress [General Appearance - Well-Appearing] : healthy appearing [PERRL With Normal Accommodation] : pupils were equal in size, round, and reactive to light [Extraocular Movements] : extraocular movements were intact [Strabismus] : no strabismus was seen [Normal Oral Mucosa] : normal oral mucosa [No Oral Pallor] : no oral pallor [No Oral Cyanosis] : no oral cyanosis [Outer Ear] : the ears and nose were normal in appearance [Examination Of The Oral Cavity] : the lips and gums were normal [Nasal Cavity] : the nasal mucosa and septum were normal [Oropharynx] : The oropharynx was normal [Neck Cervical Mass (___cm)] : no neck mass was observed [Jugular Venous Distention Increased] : there was no jugular-venous distention [Respiration, Rhythm And Depth] : normal respiratory rhythm and effort [Exaggerated Use Of Accessory Muscles For Inspiration] : no accessory muscle use [Auscultation Breath Sounds / Voice Sounds] : lungs were clear to auscultation bilaterally [Edema] : there was no peripheral edema [Abdomen Soft] : soft [Abdomen Tenderness] : non-tender [Cervical Lymph Nodes Enlarged Posterior Bilaterally] : posterior cervical [Cervical Lymph Nodes Enlarged Anterior Bilaterally] : anterior cervical [Supraclavicular Lymph Nodes Enlarged Bilaterally] : supraclavicular [Axillary Lymph Nodes Enlarged Bilaterally] : axillary [No CVA Tenderness] : no ~M costovertebral angle tenderness [No Spinal Tenderness] : no spinal tenderness [Nail Clubbing] : no clubbing  or cyanosis of the fingernails [Involuntary Movements] : no involuntary movements were seen [Motor Tone] : muscle strength and tone were normal [] : no rash [No Focal Deficits] : no focal deficits [Version 1] : Word list version 1 - Banana, Sunrise, Chair [Normal Clock Drawn, with correct arm position (2 points)] : normal clock drawn, with correct arm position (2 points) [3 Words (3 points)] : 3 words (3 points) [5 Points] : 5 points [FreeTextEntry1] : partial wax right ear [Heart Sounds] : normal S1 and S2

## 2021-05-17 NOTE — REASON FOR VISIT
[Initial Evaluation] : an initial evaluation [Family Member] : family member [FreeTextEntry1] : And to establish care. History of labile hypertension, PAF

## 2021-05-25 ENCOUNTER — NON-APPOINTMENT (OUTPATIENT)
Age: 86
End: 2021-05-25

## 2021-06-11 ENCOUNTER — NON-APPOINTMENT (OUTPATIENT)
Age: 86
End: 2021-06-11

## 2021-06-18 ENCOUNTER — NON-APPOINTMENT (OUTPATIENT)
Age: 86
End: 2021-06-18

## 2021-09-15 ENCOUNTER — APPOINTMENT (OUTPATIENT)
Dept: GERIATRICS | Facility: CLINIC | Age: 86
End: 2021-09-15
Payer: MEDICARE

## 2021-09-15 VITALS
BODY MASS INDEX: 21.64 KG/M2 | HEIGHT: 63 IN | HEART RATE: 70 BPM | OXYGEN SATURATION: 97 % | DIASTOLIC BLOOD PRESSURE: 70 MMHG | SYSTOLIC BLOOD PRESSURE: 120 MMHG | WEIGHT: 122.13 LBS | RESPIRATION RATE: 14 BRPM

## 2021-09-15 DIAGNOSIS — K27.9 PEPTIC ULCER, SITE UNSPECIFIED, UNSPECIFIED AS ACUTE OR CHRONIC, W/OUT HEMORRHAGE OR PERFORATION: ICD-10-CM

## 2021-09-15 DIAGNOSIS — I63.9 CEREBRAL INFARCTION, UNSPECIFIED: ICD-10-CM

## 2021-09-15 PROCEDURE — 99214 OFFICE O/P EST MOD 30 MIN: CPT | Mod: GC,25

## 2021-09-15 PROCEDURE — 90662 IIV NO PRSV INCREASED AG IM: CPT

## 2021-09-15 PROCEDURE — G0008: CPT

## 2021-09-15 RX ORDER — NIFEDIPINE 90 MG/1
90 TABLET, EXTENDED RELEASE ORAL
Qty: 30 | Refills: 5 | Status: DISCONTINUED | COMMUNITY
Start: 2018-05-16 | End: 2021-09-15

## 2021-09-15 NOTE — END OF VISIT
[] : Resident [FreeTextEntry3] : Mrs. Membreno was seen with Dr. Leon, R3. I obtained a detailed interval history personally examined the patient. I discussed my findings and plan with the patient, her family and the resident. I reviewed the resident note and agree with assessment and plan. The patient is status post right MCA territory stroke and embolectomy in late May of this year. She has made excellent functional recovery. She is on high intensity statin as per neurosurgery. Advised to continue current regimen. Flu shot was administered. Advised followup in 3-4 months

## 2021-09-15 NOTE — PHYSICAL EXAM
[Alert] : alert [No Acute Distress] : in no acute distress [Sclera] : the sclera and conjunctiva were normal [PERRL] : pupils were equal in size, round, and reactive to light [Normal Appearance] : the appearance of the neck was normal [No Respiratory Distress] : no respiratory distress [Auscultation Breath Sounds / Voice Sounds] : lungs were clear to auscultation bilaterally [Normal PMI] : the apical impulse was abnormal [Normal S1, S2] : normal S1 and S2 [Heart Rate And Rhythm] : heart rate was normal and rhythm regular [Abdomen Tenderness] : non-tender [Abdomen Soft] : soft [No Focal Deficits] : no focal deficits [Supple] : the neck was supple [Respiration, Rhythm And Depth] : normal respiratory rhythm and effort [Edema] : edema was not present [No Clubbing, Cyanosis] : no clubbing or cyanosis of the fingernails [Involuntary Movements] : no involuntary movements were seen [Motor Tone] : muscle strength and tone were normal [Normal Color / Pigmentation] : normal skin color and pigmentation [] : no rash [Normal] : normal affect and normal mood

## 2021-09-15 NOTE — HISTORY OF PRESENT ILLNESS
[No falls in past year] : Patient reported no falls in the past year [Completely Independent] : Completely independent. [FreeTextEntry1] : The patient is a 89 year old female with PMHx a fib on eliquis, CKD, HLD, HTN, PUD, CVA who is presenting for follow-up. \par The patient suffered a R MCA stroke s/p thrombectomy 5/21. The patient was on coumadin at that time for a fib and INR was noted to be subtherapeutic. The patient has since been transitioned to eliquis 2.5mg bid. [facility] [place] after discharge from the hospital. Patient uses a walker for ambulation. No falls since CVA. \par

## 2021-09-15 NOTE — ASSESSMENT
[FreeTextEntry1] : 89 year old female with PMHx a fib on eliquis, CKD, HLD, HTN, PUD, CVA who is presenting for follow-up. \par \par #CVA\par -R MCA stroke s/p thrombectomy 5/21 and discharge to rehab. CVA 2/2 subtherapeutic coumadin. Transitioned to eliquis 2.5mg bid\par -No focal neurological deficits \par -c/w high dose statin 40mg qd per neurosurgery\par -no falls since CVA, ambulates with walker\par \par #HTN\par -120/70 in office today\par -c/w nifedipine 90mg qd \par \par #Atrial fibrillation\par -c/w eliquis 2.5mg bid\par \par #HCM\par -Covid vaccination 2nd shot 3/21\par -Flu shot today \par -No labs required today\par

## 2021-10-12 ENCOUNTER — NON-APPOINTMENT (OUTPATIENT)
Age: 86
End: 2021-10-12

## 2021-10-12 ENCOUNTER — APPOINTMENT (OUTPATIENT)
Dept: ELECTROPHYSIOLOGY | Facility: CLINIC | Age: 86
End: 2021-10-12
Payer: MEDICARE

## 2021-10-12 PROCEDURE — 93294 REM INTERROG EVL PM/LDLS PM: CPT

## 2021-10-12 PROCEDURE — 93296 REM INTERROG EVL PM/IDS: CPT

## 2021-12-26 ENCOUNTER — RX RENEWAL (OUTPATIENT)
Age: 86
End: 2021-12-26

## 2022-01-05 ENCOUNTER — APPOINTMENT (OUTPATIENT)
Dept: GERIATRICS | Facility: CLINIC | Age: 87
End: 2022-01-05
Payer: MEDICARE

## 2022-01-05 VITALS
HEART RATE: 73 BPM | OXYGEN SATURATION: 98 % | BODY MASS INDEX: 20.55 KG/M2 | TEMPERATURE: 96.8 F | DIASTOLIC BLOOD PRESSURE: 70 MMHG | RESPIRATION RATE: 16 BRPM | SYSTOLIC BLOOD PRESSURE: 110 MMHG | WEIGHT: 116 LBS

## 2022-01-05 DIAGNOSIS — R42 DIZZINESS AND GIDDINESS: ICD-10-CM

## 2022-01-05 DIAGNOSIS — G47.00 INSOMNIA, UNSPECIFIED: ICD-10-CM

## 2022-01-05 DIAGNOSIS — N18.2 CHRONIC KIDNEY DISEASE, STAGE 2 (MILD): ICD-10-CM

## 2022-01-05 PROCEDURE — 99215 OFFICE O/P EST HI 40 MIN: CPT | Mod: GC

## 2022-01-05 NOTE — END OF VISIT
[] : Resident [Time Spent: ___ minutes] : I have spent [unfilled] minutes of time on the encounter. [FreeTextEntry3] : Mrs. Membreno was seen with Dr. Inman, R3. I obtained a detailed interval history and personally examined the patient. I discussed my findings and plan with the patient, her granddaughter and Dr. Inman. I reviewed the resident's note and agree with assessment and plan as outlined. The patient is experiencing anxiety and sleeplessness. She has had a good response to Celexa in the past. She was able to tolerate Celexa for about 3 weeks and was experiencing some dizziness which was likely coincidental. Will rechallenge with Celexa. Will update labs.

## 2022-01-05 NOTE — PHYSICAL EXAM
[Alert] : alert [No Acute Distress] : in no acute distress [Sclera] : the sclera and conjunctiva were normal [EOMI] : extraocular movements were intact [PERRL] : pupils were equal in size, round, and reactive to light [Normal Outer Ear/Nose] : the ears and nose were normal in appearance [Normal Appearance] : the appearance of the neck was normal [Supple] : the neck was supple [No Respiratory Distress] : no respiratory distress [Respiration, Rhythm And Depth] : normal respiratory rhythm and effort [Auscultation Breath Sounds / Voice Sounds] : lungs were clear to auscultation bilaterally [Heart Rate And Rhythm] : heart rate was normal and rhythm regular [Bowel Sounds] : normal bowel sounds [Abdomen Tenderness] : non-tender [Abdomen Soft] : soft [No Focal Deficits] : no focal deficits [Normal Affect] : the affect was normal [Normal Mood] : the mood was normal [No Spinal Tenderness] : no spinal tenderness [No Clubbing, Cyanosis] : no clubbing or cyanosis of the fingernails [Involuntary Movements] : no involuntary movements were seen [de-identified] : mitral murmur

## 2022-01-05 NOTE — REVIEW OF SYSTEMS
[Lower Ext Edema] : lower extremity edema [Dizziness] : dizziness [Difficulty Walking] : difficulty walking [Sleep Disturbances] : sleep disturbances [Anxiety] : anxiety [Fever] : no fever [Chills] : no chills [Recent Weight Gain (___ Lbs)] : no recent weight gain [Recent Weight Loss (___ Lbs)] : no recent weight loss [Chest Pain] : no chest pain [Palpitations] : no palpitations [Shortness Of Breath] : no shortness of breath [Cough] : no cough [Abdominal Pain] : no abdominal pain [Vomiting] : no vomiting [Confused] : no confusion [Fainting] : no fainting [Depression] : no depression [Change In Personality] : no personality change [Emotional Problems] : no emotional problems

## 2022-01-05 NOTE — ASSESSMENT
[FreeTextEntry1] : 89F, Hx of afib on eliquis, CKD, HLD, HTN, PUD, CVA here for follow up. \par \par Multiple medical issues:\par \par 1) Insomnia. Difficulty staying asleep, poor sleep quality. Sleep hygiene poor. Suspect that is mostly age-related, but irregular sleep timing and anxiety are exacerbating. Patient previously on celexa for anxiety/depression, tolerated well. \par 2) Dizziness, weakness, mostly at night. Suspect this is in the setting of low BPs 2/2 to nifedipine/metoprolol dosing. Advised to dose nifedipine earlier in the day to avoid stacking BP meds. Advised to improve PO water intake. \par \par #Insomnia\par -counseled on sleep hygiene, avoiding napping during day\par -anxiety treatment as below\par \par #Anxiety\par -restart celexa 5mg at night; can titrate up to 10mg as tolerated \par \par #Dizziness\par -suspect 2/2 to polypharmacy from BP medications; will reach out to Dr. Porras about issue and whether patient may benefit from dose reduction/which medication he would prefer to adjust based on kidney function\par -for now, patient to dose nifedipine earlier in the day to help avoid low BPs at night\par -counseled to increase fluid intake at home, approx 1.5L daily \par \par #HCM\par -will send basic labs for home draw, last set from 09/2021 \par -patient's home situation may be unsafe long-term, and patient is at risk for falls; family currently in discussion re: having patient live w/ family members full time

## 2022-01-05 NOTE — HISTORY OF PRESENT ILLNESS
[No falls in past year] : Patient reported no falls in the past year [FreeTextEntry1] : 89F, Hx of afib on eliquis, CKD, HLD, HTN, PUD, CVA, here for follow up. Primary complaint is regarding difficulty sleeping and dizziness/malaise at night. Patient reports having difficulty staying asleep and returning to sleep once awake. Complains that sleep is not "deep" and poor quality. Has had a few episodes at night w/ dizziness and instability. Sleeps during the day intermittently to recoup sleep. Has anxiety re: not being able to sleep. Was previously self-administering xanax for sleep but has since stopped. Tried melatonin, but had side effects. \par \par Patient on several BP medications at home; at night, takes nifedipine, metoprolol. During day, metoprolol and candesartan. Lasix q48h. Took metoprolol and candesartan before appointment today. /68 in office, no orthostatic hypotension. PO water intake at home is questionably low. \par \par

## 2022-01-06 ENCOUNTER — NON-APPOINTMENT (OUTPATIENT)
Age: 87
End: 2022-01-06

## 2022-01-07 ENCOUNTER — LABORATORY RESULT (OUTPATIENT)
Age: 87
End: 2022-01-07

## 2022-01-07 ENCOUNTER — NON-APPOINTMENT (OUTPATIENT)
Age: 87
End: 2022-01-07

## 2022-01-10 LAB
ALBUMIN SERPL ELPH-MCNC: 3.9 G/DL
ALP BLD-CCNC: 175 U/L
ALT SERPL-CCNC: 50 U/L
ANION GAP SERPL CALC-SCNC: 13 MMOL/L
APPEARANCE: CLEAR
AST SERPL-CCNC: 49 U/L
BASOPHILS # BLD AUTO: 0.02 K/UL
BASOPHILS NFR BLD AUTO: 0.3 %
BILIRUB SERPL-MCNC: 0.2 MG/DL
BILIRUBIN URINE: NEGATIVE
BLOOD URINE: NEGATIVE
BUN SERPL-MCNC: 51 MG/DL
CALCIUM SERPL-MCNC: 9.8 MG/DL
CALCIUM SERPL-MCNC: 9.8 MG/DL
CHLORIDE SERPL-SCNC: 107 MMOL/L
CHOLEST SERPL-MCNC: 139 MG/DL
CO2 SERPL-SCNC: 19 MMOL/L
COLOR: NORMAL
CREAT SERPL-MCNC: 1.63 MG/DL
EOSINOPHIL # BLD AUTO: 0.03 K/UL
EOSINOPHIL NFR BLD AUTO: 0.4 %
GLUCOSE QUALITATIVE U: NEGATIVE
GLUCOSE SERPL-MCNC: 113 MG/DL
HCT VFR BLD CALC: 37.1 %
HDLC SERPL-MCNC: 60 MG/DL
HGB BLD-MCNC: 11.9 G/DL
IMM GRANULOCYTES NFR BLD AUTO: 0.3 %
KETONES URINE: NEGATIVE
LDLC SERPL CALC-MCNC: 64 MG/DL
LEUKOCYTE ESTERASE URINE: ABNORMAL
LYMPHOCYTES # BLD AUTO: 1.6 K/UL
LYMPHOCYTES NFR BLD AUTO: 20.2 %
MAN DIFF?: NORMAL
MCHC RBC-ENTMCNC: 30.6 PG
MCHC RBC-ENTMCNC: 32.1 GM/DL
MCV RBC AUTO: 95.4 FL
MONOCYTES # BLD AUTO: 0.53 K/UL
MONOCYTES NFR BLD AUTO: 6.7 %
NEUTROPHILS # BLD AUTO: 5.71 K/UL
NEUTROPHILS NFR BLD AUTO: 72.1 %
NITRITE URINE: NEGATIVE
NONHDLC SERPL-MCNC: 79 MG/DL
PARATHYROID HORMONE INTACT: 212 PG/ML
PH URINE: 5
PHOSPHATE SERPL-MCNC: 3.1 MG/DL
PLATELET # BLD AUTO: 227 K/UL
POTASSIUM SERPL-SCNC: 4.9 MMOL/L
PROT SERPL-MCNC: 6.8 G/DL
PROTEIN URINE: NORMAL
RBC # BLD: 3.89 M/UL
RBC # FLD: 16 %
SODIUM SERPL-SCNC: 139 MMOL/L
SPECIFIC GRAVITY URINE: 1.01
TRIGL SERPL-MCNC: 78 MG/DL
URATE SERPL-MCNC: 8.2 MG/DL
UROBILINOGEN URINE: NORMAL
WBC # FLD AUTO: 7.91 K/UL

## 2022-01-11 ENCOUNTER — APPOINTMENT (OUTPATIENT)
Dept: ELECTROPHYSIOLOGY | Facility: CLINIC | Age: 87
End: 2022-01-11
Payer: MEDICARE

## 2022-01-11 ENCOUNTER — NON-APPOINTMENT (OUTPATIENT)
Age: 87
End: 2022-01-11

## 2022-01-11 PROCEDURE — 93294 REM INTERROG EVL PM/LDLS PM: CPT

## 2022-01-11 PROCEDURE — 93296 REM INTERROG EVL PM/IDS: CPT

## 2022-01-13 ENCOUNTER — NON-APPOINTMENT (OUTPATIENT)
Age: 87
End: 2022-01-13

## 2022-01-25 ENCOUNTER — NON-APPOINTMENT (OUTPATIENT)
Age: 87
End: 2022-01-25

## 2022-01-27 ENCOUNTER — LABORATORY RESULT (OUTPATIENT)
Age: 87
End: 2022-01-27

## 2022-01-27 ENCOUNTER — APPOINTMENT (OUTPATIENT)
Dept: GERIATRICS | Facility: CLINIC | Age: 87
End: 2022-01-27
Payer: MEDICARE

## 2022-01-27 VITALS
OXYGEN SATURATION: 98 % | BODY MASS INDEX: 20.05 KG/M2 | SYSTOLIC BLOOD PRESSURE: 130 MMHG | RESPIRATION RATE: 16 BRPM | HEART RATE: 72 BPM | HEIGHT: 63.6 IN | WEIGHT: 116 LBS | DIASTOLIC BLOOD PRESSURE: 70 MMHG

## 2022-01-27 DIAGNOSIS — R41.3 OTHER AMNESIA: ICD-10-CM

## 2022-01-27 DIAGNOSIS — R53.1 WEAKNESS: ICD-10-CM

## 2022-01-27 DIAGNOSIS — F41.9 ANXIETY DISORDER, UNSPECIFIED: ICD-10-CM

## 2022-01-27 DIAGNOSIS — F32.A ANXIETY DISORDER, UNSPECIFIED: ICD-10-CM

## 2022-01-27 PROCEDURE — 99215 OFFICE O/P EST HI 40 MIN: CPT

## 2022-01-28 PROBLEM — R53.1 GENERALIZED WEAKNESS: Status: ACTIVE | Noted: 2022-01-27

## 2022-01-28 PROBLEM — R41.3 MEMORY IMPAIRMENT: Status: ACTIVE | Noted: 2022-01-28

## 2022-01-28 PROBLEM — F41.9 ANXIETY AND DEPRESSION: Status: ACTIVE | Noted: 2022-01-28

## 2022-01-28 NOTE — HISTORY OF PRESENT ILLNESS
[With Patient/Caregiver] : , with patient/caregiver [Reviewed no changes] : Reviewed, no changes [I will adhere to the patient's wishes.] : I will adhere to the patient's wishes. [FreeTextEntry1] : NOLBERTO STEWART is a 89 yo female with PMH of afib (on eliquis), CKD, HLD, HTN, CVA, and anxiety who presents today for acute visit.\par Patient is accompanied by son, Rupert who provided collateral hx.\par \par \par Weakness:\par -Patient states that she has been "feeling weak lately"\par -states that this has been going on for about 7-10 days\par -weakness is accompanied by some SHIN, and occasional loose stools\par -Denies pain. Denies acute visits/falls. Denies HA/dizziness, SOB/CP, abdominal pain, dysuria\par -denies decreased PO intake\par \par Memory impairment:\par -son states that patient has been having "memory problems" for a while, but that within the last 7-10 days it has been worse\par -when asked what is bothering patient the most, she states "my memory" and says "I am forgetting things that I don't usually forget\par \par Depression/anxiety:\par -states that she feels very depressed\par -states that she is sad that she cannot do the things that she used to do \par -son states that NANI Patricia started recently (10 days ago) and that has been a very difficult adjustment\par -states that she likes to cook but it has been difficult lately\par -son states that patient has lost certain interests such as doing puzzles\par -patient states that she has difficulty sleeping because her mind is racing\par \par CVA:\par -patient had R MCA stroke s/p thrombectomy in May, 2021\par -went to rehab then home\par -since then has required increased assistance with IADLs and ADLs\par -Patient ambulates with walker and uses wheelchair for longer travel\par -no recent falls\par -patient lives alone but now has HHA who comes to the house in the daytime 5 days/week\par -family members have had increased presence as well, taking turns on spending the night at patient's home for increased supervision\par \par \par \par \par \par \par \par  [GDS] : 11 [AdvancecareDate] : 01/22 [FreeTextEntry4] : plan to complete HCP and MOLST at f/u visit

## 2022-01-28 NOTE — PHYSICAL EXAM
[Alert] : alert [No Acute Distress] : in no acute distress [Sclera] : the sclera and conjunctiva were normal [EOMI] : extraocular movements were intact [PERRL] : pupils were equal in size, round, and reactive to light [Normal Oral Mucosa] : normal oral mucosa [Normal Outer Ear/Nose] : the ears and nose were normal in appearance [Both Tympanic Membranes Were Examined] : both tympanic membranes were normal [Normal Appearance] : the appearance of the neck was normal [Supple] : the neck was supple [No Respiratory Distress] : no respiratory distress [No Acc Muscle Use] : no accessory muscle use [Respiration, Rhythm And Depth] : normal respiratory rhythm and effort [Auscultation Breath Sounds / Voice Sounds] : lungs were clear to auscultation bilaterally [Normal S1, S2] : normal S1 and S2 [Heart Rate And Rhythm] : heart rate was normal and rhythm regular [Edema] : edema was not present [Pedal Pulses Normal] : the pedal pulses are present [Bowel Sounds] : normal bowel sounds [Abdomen Tenderness] : non-tender [Abdomen Soft] : soft [Cervical Lymph Nodes Enlarged Posterior Bilaterally] : posterior cervical [Supraclavicular Lymph Nodes Enlarged Bilaterally] : supraclavicular [Cervical Lymph Nodes Enlarged Anterior Bilaterally] : anterior cervical, supraclavicular [Axillary Lymph Nodes Enlarged Bilaterally] : axillary [No CVA Tenderness] : no CVA  tenderness [No Spinal Tenderness] : no spinal tenderness [Normal Color / Pigmentation] : normal skin color and pigmentation [No Focal Deficits] : no focal deficits [Normal Gait] : abnormal gait [de-identified] : patient appears sad, but otherwise well-appearing, interactive older adult [de-identified] : patient uses walker to ambulate , wheelchair for longer distances [de-identified] : sad

## 2022-01-28 NOTE — REVIEW OF SYSTEMS
[As Noted in HPI] : as noted in HPI [Feeling Poorly] : feeling poorly [Feeling Tired] : feeling tired [SOB on Exertion] : shortness of breath during exertion [Anxiety] : anxiety [Depression] : depression [Fever] : no fever [Chills] : no chills [Nasal Discharge] : no nasal discharge [Sore Throat] : no sore throat [Hoarseness] : no hoarseness [Chest Pain] : no chest pain [Palpitations] : no palpitations [Shortness Of Breath] : no shortness of breath [Wheezing] : no wheezing [Cough] : no cough [Abdominal Pain] : no abdominal pain [Vomiting] : no vomiting [Constipation] : no constipation [Diarrhea] : no diarrhea [Dysuria] : no dysuria [Dizziness] : no dizziness [Fainting] : no fainting [Suicidal] : not suicidal

## 2022-01-28 NOTE — ASSESSMENT
[FreeTextEntry1] : -plan for f/u with Dr. Medina in 3 months\par \par Direct patient time 12:30-1:30pm\par \par LYNDSAY Baker-BC

## 2022-01-31 ENCOUNTER — APPOINTMENT (OUTPATIENT)
Dept: GERIATRICS | Facility: CLINIC | Age: 87
End: 2022-01-31
Payer: MEDICARE

## 2022-01-31 DIAGNOSIS — D64.9 ANEMIA, UNSPECIFIED: ICD-10-CM

## 2022-01-31 LAB
ANION GAP SERPL CALC-SCNC: 14 MMOL/L
APPEARANCE: CLEAR
APPEARANCE: CLEAR
BACTERIA: NEGATIVE
BASOPHILS # BLD AUTO: 0.01 K/UL
BASOPHILS NFR BLD AUTO: 0.1 %
BILIRUBIN URINE: NEGATIVE
BILIRUBIN URINE: NEGATIVE
BLOOD URINE: NEGATIVE
BLOOD URINE: NEGATIVE
BUN SERPL-MCNC: 61 MG/DL
CALCIUM SERPL-MCNC: 9.6 MG/DL
CHLORIDE SERPL-SCNC: 105 MMOL/L
CO2 SERPL-SCNC: 18 MMOL/L
COLOR: YELLOW
COLOR: YELLOW
CREAT SERPL-MCNC: 1.71 MG/DL
EOSINOPHIL # BLD AUTO: 0.04 K/UL
EOSINOPHIL NFR BLD AUTO: 0.6 %
GLUCOSE QUALITATIVE U: NEGATIVE
GLUCOSE QUALITATIVE U: NEGATIVE
GLUCOSE SERPL-MCNC: 118 MG/DL
HCT VFR BLD CALC: 31.7 %
HGB BLD-MCNC: 10.3 G/DL
HYALINE CASTS: 1 /LPF
IMM GRANULOCYTES NFR BLD AUTO: 0.3 %
KETONES URINE: NEGATIVE
KETONES URINE: NEGATIVE
LEUKOCYTE ESTERASE URINE: ABNORMAL
LEUKOCYTE ESTERASE URINE: ABNORMAL
LYMPHOCYTES # BLD AUTO: 1.04 K/UL
LYMPHOCYTES NFR BLD AUTO: 15.5 %
MAN DIFF?: NORMAL
MCHC RBC-ENTMCNC: 30.9 PG
MCHC RBC-ENTMCNC: 32.5 GM/DL
MCV RBC AUTO: 95.2 FL
MICROSCOPIC-UA: NORMAL
MONOCYTES # BLD AUTO: 0.6 K/UL
MONOCYTES NFR BLD AUTO: 9 %
NEUTROPHILS # BLD AUTO: 4.98 K/UL
NEUTROPHILS NFR BLD AUTO: 74.5 %
NITRITE URINE: NEGATIVE
NITRITE URINE: NEGATIVE
PH URINE: 6
PH URINE: 6
PLATELET # BLD AUTO: 190 K/UL
POTASSIUM SERPL-SCNC: 4.6 MMOL/L
PROTEIN URINE: ABNORMAL
PROTEIN URINE: ABNORMAL
RAPID RVP RESULT: NOT DETECTED
RBC # BLD: 3.33 M/UL
RBC # FLD: 17.5 %
RED BLOOD CELLS URINE: 1 /HPF
SARS-COV-2 RNA PNL RESP NAA+PROBE: NOT DETECTED
SODIUM SERPL-SCNC: 138 MMOL/L
SPECIFIC GRAVITY URINE: 1.02
SPECIFIC GRAVITY URINE: 1.02
SQUAMOUS EPITHELIAL CELLS: 2 /HPF
UROBILINOGEN URINE: NORMAL
UROBILINOGEN URINE: NORMAL
WBC # FLD AUTO: 6.69 K/UL
WHITE BLOOD CELLS URINE: 9 /HPF

## 2022-01-31 PROCEDURE — 99214 OFFICE O/P EST MOD 30 MIN: CPT

## 2022-01-31 PROCEDURE — 99204 OFFICE O/P NEW MOD 45 MIN: CPT | Mod: 95

## 2022-01-31 PROCEDURE — 99497 ADVNCD CARE PLAN 30 MIN: CPT | Mod: 95

## 2022-01-31 RX ORDER — NIFEDIPINE 60 MG/1
60 TABLET, FILM COATED, EXTENDED RELEASE ORAL
Qty: 90 | Refills: 0 | Status: DISCONTINUED | COMMUNITY
Start: 2022-01-14

## 2022-01-31 RX ORDER — FUROSEMIDE 20 MG/1
20 TABLET ORAL
Qty: 30 | Refills: 0 | Status: DISCONTINUED | COMMUNITY
Start: 2021-09-15

## 2022-02-02 ENCOUNTER — TRANSCRIPTION ENCOUNTER (OUTPATIENT)
Age: 87
End: 2022-02-02

## 2022-02-03 ENCOUNTER — APPOINTMENT (OUTPATIENT)
Dept: GERIATRICS | Facility: CLINIC | Age: 87
End: 2022-02-03
Payer: MEDICARE

## 2022-02-03 ENCOUNTER — TRANSCRIPTION ENCOUNTER (OUTPATIENT)
Age: 87
End: 2022-02-03

## 2022-02-03 DIAGNOSIS — N39.0 URINARY TRACT INFECTION, SITE NOT SPECIFIED: ICD-10-CM

## 2022-02-03 LAB
BASOPHILS # BLD AUTO: 0.02 K/UL
BASOPHILS NFR BLD AUTO: 0.4 %
EOSINOPHIL # BLD AUTO: 0.03 K/UL
EOSINOPHIL NFR BLD AUTO: 0.6 %
HCT VFR BLD CALC: 32.3 %
HGB BLD-MCNC: 10.6 G/DL
IMM GRANULOCYTES NFR BLD AUTO: 0.4 %
LYMPHOCYTES # BLD AUTO: 0.87 K/UL
LYMPHOCYTES NFR BLD AUTO: 16.8 %
MAN DIFF?: NORMAL
MCHC RBC-ENTMCNC: 31.2 PG
MCHC RBC-ENTMCNC: 32.8 GM/DL
MCV RBC AUTO: 95 FL
MONOCYTES # BLD AUTO: 0.33 K/UL
MONOCYTES NFR BLD AUTO: 6.4 %
NEUTROPHILS # BLD AUTO: 3.92 K/UL
NEUTROPHILS NFR BLD AUTO: 75.4 %
PLATELET # BLD AUTO: 201 K/UL
RBC # BLD: 3.4 M/UL
RBC # FLD: 17.3 %
WBC # FLD AUTO: 5.19 K/UL

## 2022-02-03 PROCEDURE — 99442: CPT | Mod: 95

## 2022-02-03 RX ORDER — CEFPODOXIME PROXETIL 200 MG/1
200 TABLET, FILM COATED ORAL
Qty: 7 | Refills: 0 | Status: DISCONTINUED | COMMUNITY
Start: 2022-01-31 | End: 2022-02-03

## 2022-02-03 NOTE — HISTORY OF PRESENT ILLNESS
[Home] : at home, [unfilled] , at the time of the visit. [Medical Office: ([facility]___] : at the medical office located in  [Family Member] : family member [Other:____] : [unfilled] [Verbal consent obtained from patient] : the patient, [unfilled] [With Patient/Caregiver] : , with patient/caregiver [Designated Healthcare Proxy] : Designated healthcare proxy [Name: ___] : Health Care Proxy's Name: [unfilled]  [Relationship: ___] : Relationship: [unfilled] [DNR] : DNR [DNI] : DNI [Last Verification Date: ___] : Last Verification Date: [unfilled] [I will adhere to the patient's wishes.] : I will adhere to the patient's wishes. [Time Spent: ___ minutes] : Time Spent: [unfilled] minutes [FreeTextEntry1] : NOLBERTO STEWART is a 89 yo female with a PMH of afib (on eliquis), CKD, HLD, HTN, CVA, and anxiety who presents today for acute visit. Patient is accompanied by Jessica (granddtr), Karina (dtr- alternate HCP) and Vanda (dtr-alternate HCP) for collateral hx.\par \par Patient was seen on 1/27/22 c/o weakness, memory impairment and depression. On Sunday, 1/30/22 patient had SOB at rest, hypoxia (O2 sat was 87%), and lethargy. \par \par Today, patient continues to be SOB at rest and with exertion, is lethargic, and has decreased PO intake.  Patient is urinating normally and had a BM today. Jessica also states that pts BP was "low" last night and that she called Dr. Dash (nephrologist) who stopped nifedipine. States that BP today was with systolic in the 150s. Family reports that patient has been anxious when she is awake. Denies pain, any signs of bleeding including melena or hematuria. RVP with COVID-19 negative on 1/27.\par \par Patient's granddaughter (Jessica) is an ICU nurse and suggested that NOLBERTO STEWART go to the hospital but patient made it very clear that she did not want to go.  Karina Lopez (dtr) and Vanda (dtr) stated that the goals for care are comfort.\par \par \par \par \par \par \par \par  [AdvancecareDate] : 01/31/22 [FreeTextEntry4] : HCP scanned in chart:\par -Vanda Gagnon (dtr- primary HCP) [phone]\par -Karina Issa (dtr-secondary HCP) [phone]\par \par JULIO C completed today- DNR/DNI/comfort measures only

## 2022-02-03 NOTE — REASON FOR VISIT
[Acute] : an acute visit [Family Member] : family member [Other: _____] : [unfilled] [FreeTextEntry1] : lethargy, SOB, hypoxia [FreeTextEntry3] : Karina (dtr- alternate HCP); Jessica (granddtr)

## 2022-02-03 NOTE — ASSESSMENT
[FreeTextEntry1] : follow up telephonic visit with Jessica (granddaughter) scheduled for next Tuesday.\par \par Time on telephonic visit 9:00am to 9:20am\par \par LYNDSAY Baker-BC

## 2022-02-03 NOTE — PHYSICAL EXAM
[No Respiratory Distress] : no respiratory distress [Respiration, Rhythm And Depth] : normal respiratory rhythm and effort [Normal Insight/Judgment] : insight and judgment were not intact [de-identified] : lethargic, responds to voice [de-identified] : unable to assess via telehealth [de-identified] : skin appeared pale on video

## 2022-02-03 NOTE — HISTORY OF PRESENT ILLNESS
[With Patient/Caregiver] : , with patient/caregiver [Designated Healthcare Proxy] : Designated healthcare proxy [Name: ___] : Health Care Proxy's Name: [unfilled]  [Relationship: ___] : Relationship: [unfilled] [DNR] : DNR [DNI] : DNI [Last Verification Date: ___] : Last Verification Date: [unfilled] [I will adhere to the patient's wishes.] : I will adhere to the patient's wishes. [FreeTextEntry1] : NOLBERTO STEWART is a 89 yo female with a PMH of afib (on eliquis), dual chamber PPM, CKD, HLD, HTN, CVA, and anxiety who presents today for follow up regarding UTI and hypoxia. Telephonic visit was conducted with patient's granddaughter, Jessica.\par \par Jessica states that patient is slightly improved from visit on 1/31. She still has increased confusion (mainly at night) and is lethargic; sleeping throughout the day. PO intake still decreased but is eating/drinking minimally with encouragement from family. Patient is to be evaluated by hospice tomorrow. \par \par Patient's BP remains soft in with systolic in the 100s. Patient's nephrologist (Dr. Dash) is managing antihypertensive medications. Holding nifedipine currently. \par \par Patient still c/o SOB with exertion; however is no longer hypoxic. Oxygen was delivered to home yesterday but patient has not needed it as oxygen last night and today has been 95% on room air. \par \par \par \par \par \par  [AdvancecareDate] : 01/31/22 [FreeTextEntry4] : HCP scanned in chart:\par -Vanda Gagnon (dtr- primary HCP) [phone]\par -Karina Issa (dtr-secondary HCP) [phone]\par \par JULIO C completed- DNR/DNI/comfort measures only

## 2022-02-03 NOTE — REASON FOR VISIT
[Home] : at home, [unfilled] , at the time of the visit. [Medical Office: ([facility])___] : at the medical office located in  [Family Member] : family member [Other:____] : [unfilled] [Verbal consent obtained from patient] : the patient, [unfilled] [Follow-Up] : a follow-up visit [FreeTextEntry1] : UTI

## 2022-02-03 NOTE — ASSESSMENT
[FreeTextEntry1] : -Telephonic visit scheduled for Thursday 2/3 with granddaughter, Jessica [phone]\par \par \par Direct patient time 11:30am-12:30pm\par \par CLAUDIA Baker \par \par

## 2022-02-05 ENCOUNTER — NON-APPOINTMENT (OUTPATIENT)
Age: 87
End: 2022-02-05

## 2022-02-06 ENCOUNTER — NON-APPOINTMENT (OUTPATIENT)
Age: 87
End: 2022-02-06

## 2022-02-06 DIAGNOSIS — I50.32 CHRONIC DIASTOLIC (CONGESTIVE) HEART FAILURE: ICD-10-CM

## 2022-02-07 ENCOUNTER — TRANSCRIPTION ENCOUNTER (OUTPATIENT)
Age: 87
End: 2022-02-07

## 2022-02-08 ENCOUNTER — APPOINTMENT (OUTPATIENT)
Dept: GERIATRICS | Facility: CLINIC | Age: 87
End: 2022-02-08
Payer: MEDICARE

## 2022-02-08 DIAGNOSIS — Z91.81 HISTORY OF FALLING: ICD-10-CM

## 2022-02-08 DIAGNOSIS — Z63.6 DEPENDENT RELATIVE NEEDING CARE AT HOME: ICD-10-CM

## 2022-02-08 PROCEDURE — 99215 OFFICE O/P EST HI 40 MIN: CPT | Mod: 95

## 2022-02-08 SDOH — SOCIAL STABILITY - SOCIAL INSECURITY: DEPENDENT RELATIVE NEEDING CARE AT HOME: Z63.6

## 2022-02-08 NOTE — ASSESSMENT
[FreeTextEntry1] : -f/u in one month with me or Dr. Medina; earlier for acute visit if needed\par \par Direct patient time 12:30pm-1:30pm\par \par LYNDSAY Baker-BC

## 2022-02-08 NOTE — REVIEW OF SYSTEMS
[Feeling Poorly] : feeling poorly [Feeling Tired] : feeling tired [Lower Ext Edema] : lower extremity edema [Cough] : cough [SOB on Exertion] : shortness of breath during exertion [Fever] : no fever [Chills] : no chills [Nasal Discharge] : no nasal discharge [Sore Throat] : no sore throat [Chest Pain] : no chest pain [Palpitations] : no palpitations [Shortness Of Breath] : no shortness of breath [Abdominal Pain] : no abdominal pain [Vomiting] : no vomiting [Constipation] : no constipation [Diarrhea] : no diarrhea [Dysuria] : no dysuria [Dizziness] : no dizziness [Fainting] : no fainting [FreeTextEntry9] : generalized weakness

## 2022-02-08 NOTE — HISTORY OF PRESENT ILLNESS
[FreeTextEntry1] : NOLBERTO STEWART is a 89 yo female with a PMH of afib (on eliquis), dual chamber PPM, CKD IV, HLD, HTN, CVA, and chronic diastolic heart failure and anxiety.\par \par Patient was denied from hospice. Family states that she was evaluated when "she was having a good day". Today, granddaughter (Jessica) and daughter (Karina) with multiple concerns about tending to patient's symptoms and what to do if patient were to have symptoms that were unmanageable at home. State that NOLBERTO STEWART has become increasingly lethargic and weak since last time she was seen. \par \par Currently, Jessica, states that NOLBERTO STEWART is lethargic, weak and has been requiring supplemental O2 mainly at night time. They are titrating oxygen based on 02 saturation and state that she required 5L of O2 last night but were able to titrate back down to 2 L NC. States that O2 last night was between 80%-85% but increased to 93% after O2 in place. Also states that patient has minimal tolerance for ADLs and is profoundly weak. States that patient has generalized edema and SHIN. Given 20 mg lasix on 2/6 and now take 10mg lasix daily with small improvement, \par \par Today, during telehealth visit, patient states that she feels "very tired". When asked if she is currently SOB but states that it is "not so bad" currently. During telehealth visit, patient laying down on couch with multiple pillows propping her up. DaughterKarina states that she cannot lay flat as she becomes more SOB. She has poor appetite and daughterKarina states that unless pt is encouraged to eat she will not. Today she was able to tolerate a small amount of banana, boost, and cereal. Denies pain. No changes in UO and having regular BMs. She is currently on 2L NC.\par

## 2022-02-08 NOTE — PHYSICAL EXAM
[No Respiratory Distress] : no respiratory distress [No Acc Muscle Use] : no accessory muscle use [Respiration, Rhythm And Depth] : normal respiratory rhythm and effort [de-identified] : ill-appearing, lethargic; opened eyes to verbal stimuli, speaking in one to two word answers [de-identified] : on 2L NC [de-identified] : unable to assess via telehealth format

## 2022-02-08 NOTE — REASON FOR VISIT
[Home] : at home, [unfilled] , at the time of the visit. [Medical Office: ([facility])___] : at the medical office located in  [Family Member] : family member [Other:____] : [unfilled] [Verbal consent obtained from patient] : the patient, [unfilled] [Other: _____] : [unfilled] [Acute] : an acute visit [FreeTextEntry1] : lethargy, weakness

## 2022-02-10 ENCOUNTER — NON-APPOINTMENT (OUTPATIENT)
Age: 87
End: 2022-02-10

## 2022-02-10 ENCOUNTER — INPATIENT (INPATIENT)
Facility: HOSPITAL | Age: 87
LOS: 13 days | Discharge: NURSING, REHAB OR LONG-TERM CARE | DRG: 291 | End: 2022-02-24
Attending: INTERNAL MEDICINE | Admitting: HOSPITALIST
Payer: MEDICARE

## 2022-02-10 VITALS
HEART RATE: 80 BPM | SYSTOLIC BLOOD PRESSURE: 120 MMHG | HEIGHT: 64 IN | DIASTOLIC BLOOD PRESSURE: 80 MMHG | WEIGHT: 110.01 LBS | RESPIRATION RATE: 18 BRPM | TEMPERATURE: 98 F | OXYGEN SATURATION: 98 %

## 2022-02-10 DIAGNOSIS — R06.02 SHORTNESS OF BREATH: ICD-10-CM

## 2022-02-10 DIAGNOSIS — Z29.9 ENCOUNTER FOR PROPHYLACTIC MEASURES, UNSPECIFIED: ICD-10-CM

## 2022-02-10 DIAGNOSIS — E78.5 HYPERLIPIDEMIA, UNSPECIFIED: ICD-10-CM

## 2022-02-10 DIAGNOSIS — I63.9 CEREBRAL INFARCTION, UNSPECIFIED: ICD-10-CM

## 2022-02-10 DIAGNOSIS — I10 ESSENTIAL (PRIMARY) HYPERTENSION: ICD-10-CM

## 2022-02-10 DIAGNOSIS — I48.20 CHRONIC ATRIAL FIBRILLATION, UNSPECIFIED: ICD-10-CM

## 2022-02-10 DIAGNOSIS — R62.7 ADULT FAILURE TO THRIVE: ICD-10-CM

## 2022-02-10 LAB
ALBUMIN SERPL ELPH-MCNC: 3.4 G/DL — SIGNIFICANT CHANGE UP (ref 3.3–5)
ALP SERPL-CCNC: 165 U/L — HIGH (ref 40–120)
ALT FLD-CCNC: 45 U/L — SIGNIFICANT CHANGE UP (ref 10–45)
ANION GAP SERPL CALC-SCNC: 11 MMOL/L — SIGNIFICANT CHANGE UP (ref 5–17)
AST SERPL-CCNC: 24 U/L — SIGNIFICANT CHANGE UP (ref 10–40)
BASE EXCESS BLDV CALC-SCNC: 1.1 MMOL/L — SIGNIFICANT CHANGE UP (ref -2–2)
BASOPHILS # BLD AUTO: 0.01 K/UL — SIGNIFICANT CHANGE UP (ref 0–0.2)
BASOPHILS NFR BLD AUTO: 0.2 % — SIGNIFICANT CHANGE UP (ref 0–2)
BILIRUB SERPL-MCNC: 0.5 MG/DL — SIGNIFICANT CHANGE UP (ref 0.2–1.2)
BUN SERPL-MCNC: 50 MG/DL — HIGH (ref 7–23)
CA-I SERPL-SCNC: 1.43 MMOL/L — HIGH (ref 1.15–1.33)
CALCIUM SERPL-MCNC: 10.7 MG/DL — HIGH (ref 8.4–10.5)
CHLORIDE BLDV-SCNC: 109 MMOL/L — HIGH (ref 96–108)
CHLORIDE SERPL-SCNC: 108 MMOL/L — SIGNIFICANT CHANGE UP (ref 96–108)
CO2 BLDV-SCNC: 28 MMOL/L — HIGH (ref 22–26)
CO2 SERPL-SCNC: 24 MMOL/L — SIGNIFICANT CHANGE UP (ref 22–31)
CREAT SERPL-MCNC: 1.43 MG/DL — HIGH (ref 0.5–1.3)
EOSINOPHIL # BLD AUTO: 0.02 K/UL — SIGNIFICANT CHANGE UP (ref 0–0.5)
EOSINOPHIL NFR BLD AUTO: 0.3 % — SIGNIFICANT CHANGE UP (ref 0–6)
GAS PNL BLDV: 140 MMOL/L — SIGNIFICANT CHANGE UP (ref 136–145)
GAS PNL BLDV: SIGNIFICANT CHANGE UP
GAS PNL BLDV: SIGNIFICANT CHANGE UP
GLUCOSE BLDV-MCNC: 90 MG/DL — SIGNIFICANT CHANGE UP (ref 70–99)
GLUCOSE SERPL-MCNC: 94 MG/DL — SIGNIFICANT CHANGE UP (ref 70–99)
HCO3 BLDV-SCNC: 27 MMOL/L — SIGNIFICANT CHANGE UP (ref 22–29)
HCT VFR BLD CALC: 33.8 % — LOW (ref 34.5–45)
HCT VFR BLDA CALC: 32 % — LOW (ref 34.5–46.5)
HGB BLD CALC-MCNC: 10.8 G/DL — LOW (ref 11.7–16.1)
HGB BLD-MCNC: 10.7 G/DL — LOW (ref 11.5–15.5)
IMM GRANULOCYTES NFR BLD AUTO: 0.3 % — SIGNIFICANT CHANGE UP (ref 0–1.5)
LACTATE BLDV-MCNC: 0.9 MMOL/L — SIGNIFICANT CHANGE UP (ref 0.7–2)
LYMPHOCYTES # BLD AUTO: 0.73 K/UL — LOW (ref 1–3.3)
LYMPHOCYTES # BLD AUTO: 12.7 % — LOW (ref 13–44)
MCHC RBC-ENTMCNC: 30.8 PG — SIGNIFICANT CHANGE UP (ref 27–34)
MCHC RBC-ENTMCNC: 31.7 GM/DL — LOW (ref 32–36)
MCV RBC AUTO: 97.4 FL — SIGNIFICANT CHANGE UP (ref 80–100)
MONOCYTES # BLD AUTO: 0.6 K/UL — SIGNIFICANT CHANGE UP (ref 0–0.9)
MONOCYTES NFR BLD AUTO: 10.5 % — SIGNIFICANT CHANGE UP (ref 2–14)
NEUTROPHILS # BLD AUTO: 4.36 K/UL — SIGNIFICANT CHANGE UP (ref 1.8–7.4)
NEUTROPHILS NFR BLD AUTO: 76 % — SIGNIFICANT CHANGE UP (ref 43–77)
NRBC # BLD: 0 /100 WBCS — SIGNIFICANT CHANGE UP (ref 0–0)
PCO2 BLDV: 45 MMHG — HIGH (ref 39–42)
PH BLDV: 7.38 — SIGNIFICANT CHANGE UP (ref 7.32–7.43)
PLATELET # BLD AUTO: 176 K/UL — SIGNIFICANT CHANGE UP (ref 150–400)
PO2 BLDV: 72 MMHG — HIGH (ref 25–45)
POTASSIUM BLDV-SCNC: 4.9 MMOL/L — SIGNIFICANT CHANGE UP (ref 3.5–5.1)
POTASSIUM SERPL-MCNC: 4.9 MMOL/L — SIGNIFICANT CHANGE UP (ref 3.5–5.3)
POTASSIUM SERPL-SCNC: 4.9 MMOL/L — SIGNIFICANT CHANGE UP (ref 3.5–5.3)
PROT SERPL-MCNC: 6.2 G/DL — SIGNIFICANT CHANGE UP (ref 6–8.3)
RBC # BLD: 3.47 M/UL — LOW (ref 3.8–5.2)
RBC # FLD: 18.2 % — HIGH (ref 10.3–14.5)
SAO2 % BLDV: 94.8 % — HIGH (ref 67–88)
SARS-COV-2 RNA SPEC QL NAA+PROBE: SIGNIFICANT CHANGE UP
SODIUM SERPL-SCNC: 143 MMOL/L — SIGNIFICANT CHANGE UP (ref 135–145)
WBC # BLD: 5.74 K/UL — SIGNIFICANT CHANGE UP (ref 3.8–10.5)
WBC # FLD AUTO: 5.74 K/UL — SIGNIFICANT CHANGE UP (ref 3.8–10.5)

## 2022-02-10 PROCEDURE — 99285 EMERGENCY DEPT VISIT HI MDM: CPT | Mod: CS

## 2022-02-10 PROCEDURE — 99223 1ST HOSP IP/OBS HIGH 75: CPT

## 2022-02-10 RX ORDER — METOPROLOL TARTRATE 50 MG
100 TABLET ORAL
Refills: 0 | Status: DISCONTINUED | OUTPATIENT
Start: 2022-02-10 | End: 2022-02-12

## 2022-02-10 RX ORDER — ATORVASTATIN CALCIUM 80 MG/1
40 TABLET, FILM COATED ORAL
Refills: 0 | Status: DISCONTINUED | OUTPATIENT
Start: 2022-02-10 | End: 2022-02-19

## 2022-02-10 RX ORDER — LANOLIN ALCOHOL/MO/W.PET/CERES
3 CREAM (GRAM) TOPICAL AT BEDTIME
Refills: 0 | Status: DISCONTINUED | OUTPATIENT
Start: 2022-02-10 | End: 2022-02-19

## 2022-02-10 RX ORDER — ATORVASTATIN CALCIUM 80 MG/1
1 TABLET, FILM COATED ORAL
Qty: 0 | Refills: 0 | DISCHARGE

## 2022-02-10 RX ORDER — LOSARTAN POTASSIUM 100 MG/1
100 TABLET, FILM COATED ORAL
Refills: 0 | Status: DISCONTINUED | OUTPATIENT
Start: 2022-02-10 | End: 2022-02-12

## 2022-02-10 RX ORDER — NIFEDIPINE 30 MG
1 TABLET, EXTENDED RELEASE 24 HR ORAL
Qty: 0 | Refills: 0 | DISCHARGE

## 2022-02-10 RX ORDER — CITALOPRAM 10 MG/1
10 TABLET, FILM COATED ORAL
Refills: 0 | Status: DISCONTINUED | OUTPATIENT
Start: 2022-02-10 | End: 2022-02-24

## 2022-02-10 RX ORDER — ONDANSETRON 8 MG/1
4 TABLET, FILM COATED ORAL EVERY 8 HOURS
Refills: 0 | Status: DISCONTINUED | OUTPATIENT
Start: 2022-02-10 | End: 2022-02-24

## 2022-02-10 RX ORDER — PANTOPRAZOLE SODIUM 20 MG/1
40 TABLET, DELAYED RELEASE ORAL
Refills: 0 | Status: DISCONTINUED | OUTPATIENT
Start: 2022-02-10 | End: 2022-02-19

## 2022-02-10 RX ORDER — FUROSEMIDE 40 MG
10 TABLET ORAL
Qty: 0 | Refills: 0 | DISCHARGE

## 2022-02-10 RX ORDER — ACETAMINOPHEN 500 MG
650 TABLET ORAL EVERY 6 HOURS
Refills: 0 | Status: DISCONTINUED | OUTPATIENT
Start: 2022-02-10 | End: 2022-02-20

## 2022-02-10 RX ORDER — FUROSEMIDE 40 MG
10 TABLET ORAL
Refills: 0 | Status: DISCONTINUED | OUTPATIENT
Start: 2022-02-10 | End: 2022-02-11

## 2022-02-10 RX ORDER — APIXABAN 2.5 MG/1
2.5 TABLET, FILM COATED ORAL EVERY 12 HOURS
Refills: 0 | Status: DISCONTINUED | OUTPATIENT
Start: 2022-02-10 | End: 2022-02-24

## 2022-02-10 RX ADMIN — Medication 100 MILLIGRAM(S): at 23:06

## 2022-02-10 RX ADMIN — APIXABAN 2.5 MILLIGRAM(S): 2.5 TABLET, FILM COATED ORAL at 23:06

## 2022-02-10 RX ADMIN — ATORVASTATIN CALCIUM 40 MILLIGRAM(S): 80 TABLET, FILM COATED ORAL at 23:06

## 2022-02-10 RX ADMIN — CITALOPRAM 10 MILLIGRAM(S): 10 TABLET, FILM COATED ORAL at 23:06

## 2022-02-10 NOTE — ED ADULT NURSE REASSESSMENT NOTE - NS ED NURSE REASSESS COMMENT FT1
Pt more awake and alert at this time. Pt tolerated PO meds with applesauce. VSS on 3L O2 per NC. Pt incontinent of urine. RN and EDT assisted pt with hygiene. Pt to go to ED holding. Son remains at bedside.

## 2022-02-10 NOTE — ED ADULT NURSE NOTE - OBJECTIVE STATEMENT
patient BIBA from home accompanied by granddaughter. As per family they noticed increased wob at night that she was placed on 5 L/NC on going for a couple of weeks. DNR/DNI + HCP. On arrival patient was awake & alert responding verbally & oriented to self, place, cannot remember year of birthday. Recognized family. Respiration easy & non-labored. Oxygen saturation of 99% on 2 L/NC. No skin breakdown. Granddaughter Jessica is an RN & gave most of the information. Dr. Pappas at bedside discussing plan of care. Sleepy on arrival- given by family xanax at 1430 to calm her down as she was told about taking her to the hospital.

## 2022-02-10 NOTE — H&P ADULT - HISTORY OF PRESENT ILLNESS
89F PMH Afib on Eliquis,s/p PPM, CKD, CHF, CVA s/p thrombectomy p/w progressive decline at home. Also with c/o SOB, SHIN, generalized weakness and dec PO intake. Family who have been taking care of pt at home have noticed a gradual decline over the past 3-weeks. Also report that the pt has been very dyspneic and anxious at night. She recently  started using 1-2L  NC  which has increased to 6L. She also takes Ativan 0.125 prn for anxiety. Of note, she had been evaluated by hospice for Hospice by [facility] ~ 1-week ago but was deemed ineligible at the time. She was sent in by her PCP today 89F PMH Afib on Eliquis,s/p PPM, CKD, diastolic CHF, CVA s/p thrombectomy p/w progressive decline at home. Also with c/o SOB, SHIN, generalized weakness and dec PO intake. Family who have been taking care of pt at home have noticed a gradual decline over the past 3-weeks. Also report that the pt has been very dyspneic and anxious at night time. She recently started using 1-2L  NC  which has increased to 6L. Per son her O2 saturations have been as low as low-80s. She has taken lasix with  no relief. Family states she has to sleep propped up with several pillows.  She has also takes Ativan prn for anxiety. She has also had poor appetite and has been fed mostly soft items like yoghurt. No reports of dec UO or changes in BM. Of note, she had been evaluated by hospice for Hospice by [facility] ~ 1-week ago but was deemed ineligible at the time. Family seeking for comfort measures only and hospice placement    ED course: Afebrile. HDS. Saturating well on 3L NC

## 2022-02-10 NOTE — ED PROVIDER NOTE - ATTENDING CONTRIBUTION TO CARE
89F hx Afib on Eliquis, PPM, CKD, CHF, CVA sp thrombectomy here with c/o progressive decline at home w primary sx of SOB, SHIN, generalized weakness, dec appetite and dec PO intake. Family struggling to care for pt. Will admit for sx mgmt, pall care consultation, possible re-eval by [facility].

## 2022-02-10 NOTE — ED PROVIDER NOTE - OBJECTIVE STATEMENT
89F hx Afib on Eliquis, PPM, CKD, CHF, CVA sp thrombectomy here with c/o progressive decline at home w primary sx of SOB, SHIN, generalized weakness, dec appetite and dec PO intake. Hx per kelsea who is an ICU RN. Son Tom is a tech in the ER as well. Pt w MOLST, DNR/DNI, comfort measures. Family has been caring for pt at home and have noted a continued decline over past 3 weeks with increasingly burdensome sx. Pt has been very anxious, dyspneic especially at night. Started using 1-2L NC few weeks ago, last night required 6L NC. Pt usually takes Ativan 0.125mg PRN, today was given 0.25 PTA and presently appears quite comfortable on 2L NC, no inc WOB, air hunger or anxiety. Pt lived home alone up until 3 weeks ago, now has aide 5 hours per day 5 days per week. Family supplements addtl care. Was evaluated for hospice by [facility] ~1 week ago and was not eligible at the time. Today was referred in by PCP DR Henley.

## 2022-02-10 NOTE — ED PROVIDER NOTE - NSICDXPASTSURGICALHX_GEN_ALL_CORE_FT
PAST SURGICAL HISTORY:  Hip Fracture s/p hip pinning    History of Abdominal Hysterectomy     S/P appendectomy 2010

## 2022-02-10 NOTE — ED PROVIDER NOTE - PHYSICAL EXAMINATION
Gen: Elderly F NAD  HEENT: NCAT PERRL EOMI normal pharynx dry mm   Neck: supple  CV: irreg, + murmur  Lung: CTA BL no wrr  Abd: +BS soft NTND  Ext: wwp, palp pulses, FROMx4, trace BL le edema   Neuro: drowsy but arouses to voice CN intact no fnd, generalized weakness

## 2022-02-10 NOTE — H&P ADULT - NSHPREVIEWOFSYSTEMS_GEN_ALL_CORE
GEN: no night sweats or change in appetite  EYES: no changes in vision or diplopia   ENT: no epistaxis, sinus pain, gingival bleeding, odynophagia or dysphagia  CV: no CP, PND or palpitations  RESP:+SOB, no cough, wheezing, or hemoptysis  GI: no hematemesis, hematochezia, or melena  : no dysuria, polyuria, or hematuria  MSK: no arthralgias or joint swelling   NEURO: no gross sensory changes, numbness, focal deficits  PSYCH: no depression or changes in concentration  HEME/ONC: no purpura, petechiae or night sweats  SKIN: no pruritus, hair loss or skin lesions  ALL: no photosensitivity, no complaints of anaphylaxis (SOB, throat swelling)

## 2022-02-10 NOTE — ED CLERICAL - NS ED CLERK NOTE PRE-ARRIVAL INFORMATION; ADDITIONAL PRE-ARRIVAL INFORMATION
CC/Reason For referral: c/o SHIN, SOB on Home O2 req 6L NC, increased lethargy, decreased po intake  s/p hospice evaluation and denied  Preferred Consultant(if applicable):  Who admits for you (if needed):  Do you have documents you would like to fax over? NO  Would you still like to speak to an ED attending? NO  ? caretaker burden - grand daughter ICU RN @ [employer] - family having hard time caring for pt at home  DNR/DNI - comfort measures only  PMH afib on Eliqus, PPM, CKD stage 4, diastolic heart failure - S/P CVA last year - deteriorating since

## 2022-02-10 NOTE — H&P ADULT - NSHPPHYSICALEXAM_GEN_ALL_CORE
Vital Signs Last 24 Hrs  T(C): 36.4 (10 Feb 2022 20:56), Max: 36.7 (10 Feb 2022 15:21)  T(F): 97.6 (10 Feb 2022 20:56), Max: 98 (10 Feb 2022 15:21)  HR: 72 (10 Feb 2022 20:56) (70 - 80)  BP: 175/69 (10 Feb 2022 20:56) (120/80 - 175/69)  BP(mean): --  RR: 18 (10 Feb 2022 20:56) (18 - 20)  SpO2: 95% (10 Feb 2022 20:56) (95% - 99%)

## 2022-02-10 NOTE — H&P ADULT - PROBLEM SELECTOR PLAN 2
-pt family seeking comfort measures only including minimizing FS or unnecessary tests  -palliative consult for hospice evaluation  -ativan prn for anxiety  -celexa

## 2022-02-10 NOTE — H&P ADULT - NSHPSOCIALHISTORY_GEN_ALL_CORE
Ambulates at baseline with walker but has been in bed more of late  Now more dependent of ADLs; used to live independently with HHA 5-hrs for 5-days/wk however needing more care and family members filling role but are being stretched thin. Needs monitoring overnight  Denied tobacco, EToH or illicit drugs

## 2022-02-10 NOTE — H&P ADULT - PROBLEM SELECTOR PLAN 1
Suspect possibly symptomatic aortic stenosis(preload dependent) in s/o multiple antiHTN meds vs less likely decompensated diastolic heart failure  -pt euvolemic on PE; clear lungs with prominent aortic murmur  -family seeking comfort care only but ok with TTE; will order one  -currently saturating well on 2-L NC  -will hold lasix for now pending TTE; may worsen AS d/t reduction in preload

## 2022-02-10 NOTE — ED PROVIDER NOTE - NSICDXPASTMEDICALHX_GEN_ALL_CORE_FT
PAST MEDICAL HISTORY:  Cerebrovascular accident (CVA) MCA infarct sp thrombectomy    HTN (Hypertension)     Hypercholesterolemia     Paroxysmal Atrial Fibrillation

## 2022-02-10 NOTE — H&P ADULT - PROBLEM SELECTOR PLAN 7
-Diet: soft diet, thickened liquids  -DVT ppx: Eliquis BiD -Cr stable compared to baseline  -avoid nephrotoxic agents  - renally dose all meds  -encourage PO hydration

## 2022-02-10 NOTE — H&P ADULT - ASSESSMENT
89F PMH Afib on Eliquis,s/p PPM, CKD, diastolic CHF, R.MCA stroke  s/p thrombectomy p/w progressive decline at home. Admitted for FTT and evaluation for hospice placement.

## 2022-02-10 NOTE — H&P ADULT - NSHPLABSRESULTS_GEN_ALL_CORE
10.7   5.74  )-----------( 176      ( 10 Feb 2022 16:27 )             33.8       02-10    143  |  108  |  50<H>  ----------------------------<  94  4.9   |  24  |  1.43<H>    Ca    10.7<H>      10 Feb 2022 16:27    TPro  6.2  /  Alb  3.4  /  TBili  0.5  /  DBili  x   /  AST  24  /  ALT  45  /  AlkPhos  165<H>  02-10            LIVER FUNCTIONS - ( 10 Feb 2022 16:27 )  Alb: 3.4 g/dL / Pro: 6.2 g/dL / ALK PHOS: 165 U/L / ALT: 45 U/L / AST: 24 U/L / GGT: x           I have personally reviewed EKG  I have personally reviewed imaging 10.7   5.74  )-----------( 176      ( 10 Feb 2022 16:27 )             33.8       02-10    143  |  108  |  50<H>  ----------------------------<  94  4.9   |  24  |  1.43<H>    Ca    10.7<H>      10 Feb 2022 16:27    TPro  6.2  /  Alb  3.4  /  TBili  0.5  /  DBili  x   /  AST  24  /  ALT  45  /  AlkPhos  165<H>  02-10            LIVER FUNCTIONS - ( 10 Feb 2022 16:27 )  Alb: 3.4 g/dL / Pro: 6.2 g/dL / ALK PHOS: 165 U/L / ALT: 45 U/L / AST: 24 U/L / GGT: x           No EKG and CXR performed per family's wishes

## 2022-02-10 NOTE — ED ADULT NURSE REASSESSMENT NOTE - NS ED NURSE REASSESS COMMENT FT1
Pt lying in stretcher asleep. Spontaneous breathing and equal chest rise. Son is at bedside. Pt reports difficulty breathing. RN titrated O2 to 3L per NC. Pt's son requesting pt receive humidified oxygen. RT called to bedside to assist with bubble bottle application. Pt does not appear to be in acute distress. She denies CP, fever, chills, or pain. Will continue to closely monitor pt.

## 2022-02-11 DIAGNOSIS — R06.00 DYSPNEA, UNSPECIFIED: ICD-10-CM

## 2022-02-11 DIAGNOSIS — R63.0 ANOREXIA: ICD-10-CM

## 2022-02-11 DIAGNOSIS — R53.1 WEAKNESS: ICD-10-CM

## 2022-02-11 DIAGNOSIS — Z51.5 ENCOUNTER FOR PALLIATIVE CARE: ICD-10-CM

## 2022-02-11 DIAGNOSIS — N18.4 CHRONIC KIDNEY DISEASE, STAGE 4 (SEVERE): ICD-10-CM

## 2022-02-11 DIAGNOSIS — R53.81 OTHER MALAISE: ICD-10-CM

## 2022-02-11 LAB — GLUCOSE BLDC GLUCOMTR-MCNC: 80 MG/DL — SIGNIFICANT CHANGE UP (ref 70–99)

## 2022-02-11 PROCEDURE — 93306 TTE W/DOPPLER COMPLETE: CPT | Mod: 26

## 2022-02-11 PROCEDURE — 99233 SBSQ HOSP IP/OBS HIGH 50: CPT

## 2022-02-11 PROCEDURE — 99223 1ST HOSP IP/OBS HIGH 75: CPT

## 2022-02-11 PROCEDURE — 71045 X-RAY EXAM CHEST 1 VIEW: CPT | Mod: 26

## 2022-02-11 RX ORDER — FUROSEMIDE 40 MG
20 TABLET ORAL ONCE
Refills: 0 | Status: COMPLETED | OUTPATIENT
Start: 2022-02-11 | End: 2022-02-11

## 2022-02-11 RX ADMIN — Medication 100 MILLIGRAM(S): at 11:50

## 2022-02-11 RX ADMIN — Medication 20 MILLIGRAM(S): at 13:35

## 2022-02-11 RX ADMIN — PANTOPRAZOLE SODIUM 40 MILLIGRAM(S): 20 TABLET, DELAYED RELEASE ORAL at 11:50

## 2022-02-11 RX ADMIN — CITALOPRAM 10 MILLIGRAM(S): 10 TABLET, FILM COATED ORAL at 21:31

## 2022-02-11 RX ADMIN — APIXABAN 2.5 MILLIGRAM(S): 2.5 TABLET, FILM COATED ORAL at 11:51

## 2022-02-11 RX ADMIN — Medication 100 MILLIGRAM(S): at 21:31

## 2022-02-11 RX ADMIN — LOSARTAN POTASSIUM 100 MILLIGRAM(S): 100 TABLET, FILM COATED ORAL at 11:49

## 2022-02-11 RX ADMIN — ATORVASTATIN CALCIUM 40 MILLIGRAM(S): 80 TABLET, FILM COATED ORAL at 21:31

## 2022-02-11 NOTE — CONSULT NOTE ADULT - PROBLEM SELECTOR RECOMMENDATION 6
Problem: Palliative care encounter.   Plan: Actions:  [x] Rapport building     [x] Symptom assessment    [x] Eliciting preferences of goals   [] Prognostic understanding    [] Emotional Support  [] Coping skill development  []  Other  Interdisciplinary Referrals: SW  Communication: d/w daughter  d/w Primary team  Will discuss PCU transfer with the primary team and the family  Documentation Review: [x] Primary Team [] Consultants [] Interdisciplinary team

## 2022-02-11 NOTE — CONSULT NOTE ADULT - SUBJECTIVE AND OBJECTIVE BOX
[facility]  [phone]  NEPHROLOGY      INITIAL CONSULT NOTE  --------------------------------------------------------------------------------  HPI:    well known to me from office   89F PMH Afib on Eliquis,s/p PPM, renovascular htn with atrophic kidney, CKD, diastolic CHF, CVA s/p thrombectomy p/w progressive decline at home. Also with c/o SOB, SHIN, generalized weakness and dec PO intake. Family who have been taking care of pt at home have noticed a gradual decline over the past 3-weeks. Also report that the pt has been very dyspneic and anxious at night time. She recently started using 1-2L  NC  which has increased to 6L.  O2 saturations have been as low as low-80s. She has taken lasix with  no relief. Family states she has to sleep propped up with several pillows.  She has also takes Ativan prn for anxiety. She has also had poor appetite and has been fed mostly soft items like yoghurt. No reports of dec UO or changes in BM. Of note, she had been evaluated by hospice for Hospice by [facility] ~ 1-week ago but was deemed ineligible at the time. Family seeking for comfort measures only and hospice placement    PAST HISTORY  --------------------------------------------------------------------------------  PAST MEDICAL & SURGICAL HISTORY:  HTN (Hypertension)    Hypercholesterolemia    Paroxysmal Atrial Fibrillation    Cerebrovascular accident (CVA)  MCA infarct sp thrombectomy    History of Abdominal Hysterectomy    Hip Fracture  s/p hip pinning    S/P appendectomy  2010      FAMILY HISTORY:  No pertinent family history in first degree relatives      PAST SOCIAL HISTORY:    ALLERGIES & MEDICATIONS  --------------------------------------------------------------------------------  Allergies    No Known Allergies    Intolerances      Standing Inpatient Medications  apixaban 2.5 milliGRAM(s) Oral every 12 hours  atorvastatin 40 milliGRAM(s) Oral <User Schedule>  citalopram 10 milliGRAM(s) Oral <User Schedule>  losartan 100 milliGRAM(s) Oral <User Schedule>  metoprolol succinate  milliGRAM(s) Oral <User Schedule>  pantoprazole    Tablet 40 milliGRAM(s) Oral before breakfast    PRN Inpatient Medications  acetaminophen     Tablet .. 650 milliGRAM(s) Oral every 6 hours PRN  aluminum hydroxide/magnesium hydroxide/simethicone Suspension 30 milliLiter(s) Oral every 4 hours PRN  melatonin 3 milliGRAM(s) Oral at bedtime PRN  ondansetron Injectable 4 milliGRAM(s) IV Push every 8 hours PRN      REVIEW OF SYSTEMS  --------------------------------------------------------------------------------  not able to give ros         VITALS/PHYSICAL EXAM  --------------------------------------------------------------------------------  T(C): 36.5 (02-11-22 @ 20:11), Max: 36.5 (02-11-22 @ 05:01)  HR: 73 (02-11-22 @ 20:11) (71 - 93)  BP: 117/66 (02-11-22 @ 20:11) (117/66 - 185/71)  RR: 18 (02-11-22 @ 20:11) (16 - 18)  SpO2: 97% (02-11-22 @ 20:11) (94% - 97%)  Wt(kg): --  Height (cm): 162.6 (02-10-22 @ 15:21)  Weight (kg): 49.9 (02-10-22 @ 15:21)  BMI (kg/m2): 18.9 (02-10-22 @ 15:21)  BSA (m2): 1.52 (02-10-22 @ 15:21)      02-11-22 @ 07:01  -  02-11-22 @ 20:31  --------------------------------------------------------  IN: 240 mL / OUT: 400 mL / NET: -160 mL      Physical Exam:  	Gen: thin elderly F  on O2   	no jvd   	Pulm: decrease bs  no rales or ronchi or wheezing  	CV: RRR, S1S2; no rub  	Back: No CVA tenderness  	Abd: +BS, soft, nontender/nondistended  	: No suprapubic tenderness  	UE: Warm, no cyanosis  no clubbing,  no edema  	LE: Warm, no cyanosis  no clubbing, no edema  	Neuro: overall lethargic   	Skin: Warm, no decrease skin turgor       LABS/STUDIES  --------------------------------------------------------------------------------              10.7   5.74  >-----------<  176      [02-10-22 @ 16:27]              33.8     143  |  108  |  50  ----------------------------<  94      [02-10-22 @ 16:27]  4.9   |  24  |  1.43        Ca     10.7     [02-10-22 @ 16:27]    TPro  6.2  /  Alb  3.4  /  TBili  0.5  /  DBili  x   /  AST  24  /  ALT  45  /  AlkPhos  165  [02-10-22 @ 16:27]          Creatinine Trend:  SCr 1.43 [02-10 @ 16:27]    Urinalysis - [10-27-20 @ 06:09]      Color Light Yellow / Appearance Clear / SG 1.011 / pH 5.5      Gluc Negative / Ketone Negative  / Bili Negative / Urobili Negative       Blood Negative / Protein Trace / Leuk Est Moderate / Nitrite Negative      RBC 1 / WBC 2 / Hyaline 1 / Gran  / Sq Epi  / Non Sq Epi 0 / Bacteria Negative

## 2022-02-11 NOTE — PROGRESS NOTE ADULT - PROBLEM SELECTOR PLAN 5
PPSv2 prior to admission:  Current functional PPSv2:  Nursing care required:  Code status documented:  A review of the paper chart has been conducted  HCP form present:               Yes and valid []               Yes but invalid []                No []   MOLST present:                   Yes and valid []               Yes but invalid []                No []  Incapacity form present:   Yes and valid []                Yes but invalid []                No []                 N/A []  Living Will:                            Yes and valid []                Yes but invalid []                No []      Family Health Care Decision Act (FHCDA) Surrogate Decision Maker Hierarchy in the absence of a health care agent  [facility] Article 81 Guardian-->Spouse or domestic partner--> Adult child-->Parent-->Sibling--> Close friend

## 2022-02-11 NOTE — CONSULT NOTE ADULT - PROBLEM SELECTOR RECOMMENDATION 5
Problem: Debility.  Current functional PPSv2: 40  Code status documented: DNR  A review of the paper chart has been conducted  HCP form present:               Yes and valid [x]               Yes but invalid []                No []   MOLST present:                   Yes and valid [x]               Yes but invalid []                No []  Incapacity form present:   Yes and valid []                Yes but invalid []                No []                 N/A [x]  Living Will:                            Yes and valid []                Yes but invalid []                No [x]      Family Health Care Decision Act (FHCDA) Surrogate Decision Maker Hierarchy in the absence of a health care agent  [facility] Article 81 Guardian-->Spouse or domestic partner--> Adult child-->Parent-->Sibling--> Close friend.

## 2022-02-11 NOTE — PROGRESS NOTE ADULT - SUBJECTIVE AND OBJECTIVE BOX
INTERNAL MEDICINE PROGRESS NOTE    Dr. Rupert Koenig, DO  Hospitalist  Division of Hospital Medicine  [facility]  Available via [employer] Teams      SUBJECTIVE:  SHIN; Fatigue at rest.    REVIEW OF SYSTEMS   10 point review of systems negative except for above.     PAST MEDICAL & SURGICAL HISTORY:  HTN (Hypertension)    Hypercholesterolemia    Paroxysmal Atrial Fibrillation    Cerebrovascular accident (CVA)  MCA infarct sp thrombectomy    History of Abdominal Hysterectomy    Hip Fracture  s/p hip pinning    S/P appendectomy  2010        MEDICATIONS  (STANDING):  apixaban 2.5 milliGRAM(s) Oral every 12 hours  atorvastatin 40 milliGRAM(s) Oral <User Schedule>  citalopram 10 milliGRAM(s) Oral <User Schedule>  losartan 100 milliGRAM(s) Oral <User Schedule>  metoprolol succinate  milliGRAM(s) Oral <User Schedule>  pantoprazole    Tablet 40 milliGRAM(s) Oral before breakfast    MEDICATIONS  (PRN):  acetaminophen     Tablet .. 650 milliGRAM(s) Oral every 6 hours PRN Temp greater or equal to 38C (100.4F), Mild Pain (1 - 3)  aluminum hydroxide/magnesium hydroxide/simethicone Suspension 30 milliLiter(s) Oral every 4 hours PRN Dyspepsia  melatonin 3 milliGRAM(s) Oral at bedtime PRN Insomnia  ondansetron Injectable 4 milliGRAM(s) IV Push every 8 hours PRN Nausea and/or Vomiting      Allergies    No Known Allergies    Intolerances        T(C): 36.5 (02-11-22 @ 05:01), Max: 36.7 (02-10-22 @ 15:21)  T(F): 97.7 (02-11-22 @ 05:01), Max: 98 (02-10-22 @ 15:21)  HR: 93 (02-11-22 @ 05:01) (70 - 93)  BP: 139/73 (02-11-22 @ 05:01) (120/80 - 185/71)  ABP: --  ABP(mean): --  RR: 18 (02-11-22 @ 05:01) (16 - 20)  SpO2: 94% (02-11-22 @ 05:01) (94% - 99%)      CONSTITUTIONAL: No acute distress.   HEENT:  Conjunctiva clear B/L.  Moist oral mucosa.   Cardiovascular: RRR with systolic murmurs. No JVD noted. No lower extremity edema B/L. Extremities are warm and well perfused.    Respiratory: Lec bs. No wrr. No accessory muscle use.   Gastrointestinal:  Soft, nontender. Non-distended. Non-rigid. No CVA tenderness B/L.  MSK:  No joint swelling. No joint erythema B/L. No midline spinal tenderness.  Neurologic:  Alert and awake. Moving all extremities. Following commands. Making eye contact.    Skin:  No rashes noted. No skin erythema noted.   Psych:  Normal affect. Normal Mood.     LABS                        10.7   5.74  )-----------( 176      ( 10 Feb 2022 16:27 )             33.8     02-10    143  |  108  |  50<H>  ----------------------------<  94  4.9   |  24  |  1.43<H>    Ca    10.7<H>      10 Feb 2022 16:27    TPro  6.2  /  Alb  3.4  /  TBili  0.5  /  DBili  x   /  AST  24  /  ALT  45  /  AlkPhos  165<H>  02-10          ALL RECENT STUDIES REVIEWED INCLUDING REPORTS AVAILABLE     CARE DISCUSSED WITH ALL CONSULTANTS

## 2022-02-11 NOTE — CONSULT NOTE ADULT - SUBJECTIVE AND OBJECTIVE BOX
HPI:  89F PMH Afib on Eliquis,s/p PPM, CKD, diastolic CHF, CVA s/p thrombectomy p/w progressive decline at home. Also with c/o SOB, SHIN, generalized weakness and dec PO intake. Family who have been taking care of pt at home have noticed a gradual decline over the past 3-weeks. Also report that the pt has been very dyspneic and anxious at night time. She recently started using 1-2L  NC  which has increased to 6L. Per son her O2 saturations have been as low as low-80s. She has taken lasix with  no relief. Family states she has to sleep propped up with several pillows.  She has also takes Ativan prn for anxiety. She has also had poor appetite and has been fed mostly soft items like yoghurt. No reports of dec UO or changes in BM. Of note, she had been evaluated by hospice for Hospice by [facility] ~ 1-week ago but was deemed ineligible at the time. Family seeking for comfort measures only and hospice placement    ED course: Afebrile. HDS. Saturating well on 3L NC     (10 Feb 2022 21:04)    PERTINENT PM/SXH:   HTN (Hypertension)    Hypercholesterolemia    Hypercholesterolemia    Hypercholesterolemia    Paroxysmal Atrial Fibrillation    Cerebrovascular accident (CVA)      History of Abdominal Hysterectomy    Hip Fracture    S/P appendectomy      FAMILY HISTORY:  No pertinent family history in first degree relatives      ITEMS NOT CHECKED ARE NOT PRESENT    SOCIAL HISTORY:   Significant other/partner[ ]  Children[ ]  Latter-day/Spirituality:  Substance hx:  [ ]   Tobacco hx:  [ ]   Alcohol hx: [ ]   Home Opioid hx:  [ ] I-Stop Reference No:  Living Situation: [ ]Home  [ ]Long term care  [ ]Rehab [ ]Other    ADVANCE DIRECTIVES:    DNR  MOLST  [ ]  Living Will  [ ]   DECISION MAKER(s):  [ ] Health Care Proxy(s)  [ ] Surrogate(s)  [ ] Guardian           Name(s): Phone Number(s):    BASELINE (I)ADL(s) (prior to admission):  [place]: [ ]Total  [ ] Moderate [ ]Dependent    Allergies    No Known Allergies    Intolerances    MEDICATIONS  (STANDING):  apixaban 2.5 milliGRAM(s) Oral every 12 hours  atorvastatin 40 milliGRAM(s) Oral <User Schedule>  citalopram 10 milliGRAM(s) Oral <User Schedule>  losartan 100 milliGRAM(s) Oral <User Schedule>  metoprolol succinate  milliGRAM(s) Oral <User Schedule>  pantoprazole    Tablet 40 milliGRAM(s) Oral before breakfast    MEDICATIONS  (PRN):  acetaminophen     Tablet .. 650 milliGRAM(s) Oral every 6 hours PRN Temp greater or equal to 38C (100.4F), Mild Pain (1 - 3)  aluminum hydroxide/magnesium hydroxide/simethicone Suspension 30 milliLiter(s) Oral every 4 hours PRN Dyspepsia  melatonin 3 milliGRAM(s) Oral at bedtime PRN Insomnia  ondansetron Injectable 4 milliGRAM(s) IV Push every 8 hours PRN Nausea and/or Vomiting    PRESENT SYMPTOMS: [ ]Unable to obtain due to poor mentation   Source if other than patient:  [ ]Family   [ ]Team [ ] Inferred/Assessed    Pain: [ ]yes [ ]no  QOL impact -   Location -                    Aggravating factors -  Quality -  Radiation -  Timing-  Severity (0-10 scale):  Minimal acceptable level (0-10 scale):     PAIN AD Score:     http://[email or web address]/[email or web address] (press ctrl +  left click to view)    [ ] Inferred Symptoms    Fatigue:                             [ ] None  [ ]Mild [ ]Moderate [ ]Severe  Drowsiness:                      [ ] None  [ ]Mild [ ]Moderate [ ]Severe  Nausea:                             [ ] None  [ ]Mild [ ]Moderate [ ]Severe  Dysgeusia:                         [ ] None  [ ]Mild [ ]Moderate [ ]Severe  Loss of appetite:              [ ] None  [ ]Mild [ ]Moderate [ ]Severe  Constipation:                    [ ] None  [ ]Mild [ ]Moderate [ ]Severe  Dyspnea:                           [ ] None  [ ]Mild [ ]Moderate [ ]Severe  Anxiety:                             [ ] None  [ ]Mild [ ]Moderate [ ]Severe  Depression:                      [ ] None  [ ]Mild [ ]Moderate [ ]Severe  Cognitive changes:          [ ] None  [ ]Mild [ ]Moderate [ ]Severe  Insomnia      :                    [ ] None  [ ]Mild [ ]Moderate [ ]Severe  Isolation:                           [ ] None  [ ]Mild [ ]Moderate [ ]Severe  Loneliness:                        [ ] None  [ ]Mild [ ]Moderate [ ]Severe  Lack of   Wellbeing:                        [ ] None  [ ]Mild [ ]Moderate [ ]Severe    Other Symptoms:  [ ]All other review of systems negative     Palliative Performance Status Version 2:         %    http://npcrc.org/files/news/palliative_performance_scale_ppsv2.pdf  PHYSICAL EXAM:  Vital Signs Last 24 Hrs  T(C): 36.5 (11 Feb 2022 05:01), Max: 36.7 (10 Feb 2022 15:21)  T(F): 97.7 (11 Feb 2022 05:01), Max: 98 (10 Feb 2022 15:21)  HR: 93 (11 Feb 2022 05:01) (70 - 93)  BP: 139/73 (11 Feb 2022 05:01) (120/80 - 185/71)  BP(mean): --  RR: 18 (11 Feb 2022 05:01) (16 - 20)  SpO2: 94% (11 Feb 2022 05:01) (94% - 99%) I&O's Summary    GENERAL:  [ ]Alert  [ ]Oriented x   [ ]Lethargic  [ ]Cachexia  [ ]Unarousable  [ ]Verbal  [ ]Non-Verbal [ ] Engaging   [  ] Confused  [  ] No distress  Behavioral:   [ ] Anxiety  [ ] Delirium [ ] Agitation [ ] Calm   [  ] Restless [ ] Other  HEENT:  [ ]Normal   [ ]Dry mouth   [ ]ET Tube/Trach  [ ]Oral lesions   [ ] NGT  [ ] Mucositis [ ] Oral  Bleeding  PULMONARY:   [ ]Clear [ ]Tachypnea  [ ]Audible excessive secretions [  ] No tachypnea  [ ]Rhonchi        [ ]Right [ ]Left [ ]Bilateral  [ ]Crackles        [ ]Right [ ]Left [ ]Bilateral  [ ]Wheezing     [ ]Right [ ]Left [ ]Bilateral  [ ]Diminished breath sounds [ ]right [ ]left [ ]bilateral  CARDIOVASCULAR:    [ ]Regular [ ]Irregular [ ]Tachy  [ ]Chao [ ]Murmur [ ] JVD  GASTROINTESTINAL:  [ ]Soft  [ ]Distended   [ ]+BS  [ ]Non tender [ ]Tender  [ ]PEG [ ]OGT/ NGT  Last BM:   GENITOURINARY:  [ ]Normal [ ] Incontinent   [ ]Oliguria/Anuria   [ ]Lopez  MUSCULOSKELETAL:   [ ]Normal   [ ]Weakness  [ ]Bed/Wheelchair bound [ ]Edema  NEUROLOGIC:   [ ]No focal deficits  [ ]Cognitive impairment  [ ]Dysphagia [ ]Dysarthria [ ]Paresis [ ]Other   SKIN:   [ ]Normal    [ ]Rash  [ ]Pressure ulcer(s)   [  ] Lesion   [    ]  Wounds       Present on admission [ ]y [ ]n    CRITICAL CARE:  [ ] Shock Present  [ ]Septic [ ]Cardiogenic [ ]Neurologic [ ]Hypovolemic  [ ]  Vasopressors [ ]  Inotropes   [ ]Respiratory failure present [ ]Mechanical ventilation [ ]Non-invasive ventilatory support [ ]High flow  [ ]Acute  [ ]Chronic [ ]Hypoxic  [ ]Hypercarbic [ ]Other  [ ]Other organ failure     LABS:                        10.7   5.74  )-----------( 176      ( 10 Feb 2022 16:27 )             33.8   02-10    143  |  108  |  50<H>  ----------------------------<  94  4.9   |  24  |  1.43<H>    Ca    10.7<H>      10 Feb 2022 16:27    TPro  6.2  /  Alb  3.4  /  TBili  0.5  /  DBili  x   /  AST  24  /  ALT  45  /  AlkPhos  165<H>  02-10        RADIOLOGY & ADDITIONAL STUDIES:    PROTEIN CALORIE MALNUTRITION PRESENT: [ ]mild [ ]moderate [ ]severe [ ]underweight [ ]morbid obesity  https://www.andeal.org/vault/2440/web/files/ONC/Table_Clinical%20Characteristics%20to%20Document%20Malnutrition-White%20JV%20et%20al%202012.pdf    Height (cm): 162.6 (02-10-22 @ 15:21)  Weight (kg): 49.9 (02-10-22 @ 15:21)  BMI (kg/m2): 18.9 (02-10-22 @ 15:21)    [ ]PPSV2 < or = to 30% [ ]significant weight loss  [ ]poor nutritional intake  [ ]anasarca      [ ]Artificial Nutrition      REFERRALS:   [ ]Chaplaincy  [ ]Hospice  [ ]Child Life  [ ]Social Work  [ ]Case management [ ]Holistic Therapy     Goals of Care Document:  HPI:  89F PMH Afib on Eliquis,s/p PPM, CKD, diastolic CHF, CVA s/p thrombectomy p/w progressive decline at home. Also with c/o SOB, SHIN, generalized weakness and dec PO intake. Family who have been taking care of pt at home have noticed a gradual decline over the past 3-weeks. Also report that the pt has been very dyspneic and anxious at night time. She recently started using 1-2L  NC  which has increased to 6L. Per son her O2 saturations have been as low as low-80s. She has taken lasix with  no relief. Family states she has to sleep propped up with several pillows.  She has also takes Ativan prn for anxiety. She has also had poor appetite and has been fed mostly soft items like yoghurt. No reports of dec UO or changes in BM. Of note, she had been evaluated by hospice for Hospice by [facility] ~ 1-week ago but was deemed ineligible at the time. Family seeking for comfort measures only and hospice placement      RDOS: 1  She and the daughter report dyspnea as an outpatient in the setting of hypoxia. This occurred with even slight exertion.    On nc. See narrative below    ED course: Afebrile. HDS. Saturating well on 3L NC     (10 Feb 2022 21:04)    PERTINENT PM/SXH:   HTN (Hypertension)    Hypercholesterolemia    Hypercholesterolemia    Hypercholesterolemia    Paroxysmal Atrial Fibrillation    Cerebrovascular accident (CVA)      History of Abdominal Hysterectomy    Hip Fracture    S/P appendectomy      FAMILY HISTORY:  No pertinent family history in first degree relatives      ITEMS NOT CHECKED ARE NOT PRESENT    SOCIAL HISTORY:   Significant other/partner[ ]  Children[ x]  Restoration/Spirituality:  Substance hx:  [ ]   Tobacco hx:  [ ]   Alcohol hx: [ ]   Home Opioid hx:  [ ] I-Stop Reference No:  Living Situation: [ ]Home  [ ]Long term care  [ ]Rehab [ ]Other    ADVANCE DIRECTIVES:    DNR  MOLST  [x ]  Living Will  [ ]   DECISION MAKER(s):  [ ] Health Care Proxy(s)  [ ] Surrogate(s)  [ ] Guardian           Name(s): Phone Number(s):    BASELINE (I)ADL(s) (prior to admission):  [place]: [ ]Total  [ ] Moderate [ ]Dependent    Allergies    No Known Allergies    Intolerances    MEDICATIONS  (STANDING):  apixaban 2.5 milliGRAM(s) Oral every 12 hours  atorvastatin 40 milliGRAM(s) Oral <User Schedule>  citalopram 10 milliGRAM(s) Oral <User Schedule>  losartan 100 milliGRAM(s) Oral <User Schedule>  metoprolol succinate  milliGRAM(s) Oral <User Schedule>  pantoprazole    Tablet 40 milliGRAM(s) Oral before breakfast    MEDICATIONS  (PRN):  acetaminophen     Tablet .. 650 milliGRAM(s) Oral every 6 hours PRN Temp greater or equal to 38C (100.4F), Mild Pain (1 - 3)  aluminum hydroxide/magnesium hydroxide/simethicone Suspension 30 milliLiter(s) Oral every 4 hours PRN Dyspepsia  melatonin 3 milliGRAM(s) Oral at bedtime PRN Insomnia  ondansetron Injectable 4 milliGRAM(s) IV Push every 8 hours PRN Nausea and/or Vomiting    PRESENT SYMPTOMS: [ ]Unable to obtain due to poor mentation   Source if other than patient:  [ ]Family   [ ]Team [ ] Inferred/Assessed    Pain: [ ]yes [x ]no  QOL impact -   Location -                    Aggravating factors -  Quality -  Radiation -  Timing-  Severity (0-10 scale):  Minimal acceptable level (0-10 scale):     PAIN AD Score:     http://geriatrictoolkit.[facility]/cog/painad.pdf (press ctrl +  left click to view)    [ ] Inferred Symptoms    Fatigue:                             [ ] None  [x ]Mild [ ]Moderate [ ]Severe  Drowsiness:                      [ x] None  [ ]Mild [ ]Moderate [ ]Severe  Nausea:                             [x ] None  [ ]Mild [ ]Moderate [ ]Severe  Dysgeusia:                         [x ] None  [ ]Mild [ ]Moderate [ ]Severe  Loss of appetite:              [ ] None  [x ]Mild [ ]Moderate [ ]Severe  Constipation:                    [ x] None  [ ]Mild [ ]Moderate [ ]Severe  Dyspnea:                           [ ] None  [ ]Mild [x ]Moderate [ ]Severe  Anxiety:                             [ ] None  [ ]Mild [ ]Moderate [ ]Severe  Depression:                      [ ] None  [ ]Mild [ ]Moderate [ ]Severe  Cognitive changes:          [ ] None  [ ]Mild [ ]Moderate [ ]Severe  Insomnia      :                    [ ] None  [ ]Mild [ ]Moderate [ ]Severe  Isolation:                           [ ] None  [ ]Mild [ ]Moderate [ ]Severe  Loneliness:                        [ ] None  [ ]Mild [ ]Moderate [ ]Severe  Lack of   Wellbeing:                        [ ] None  [ ]Mild [ ]Moderate [ ]Severe    Other Symptoms:  [x ]All other review of systems negative     Palliative Performance Status Version 2:   50    %    http://[facility].org/files/news/palliative_performance_scale_[email or web address].pdf  PHYSICAL EXAM:  Vital Signs Last 24 Hrs  T(C): 36.5 (11 Feb 2022 05:01), Max: 36.7 (10 Feb 2022 15:21)  T(F): 97.7 (11 Feb 2022 05:01), Max: 98 (10 Feb 2022 15:21)  HR: 93 (11 Feb 2022 05:01) (70 - 93)  BP: 139/73 (11 Feb 2022 05:01) (120/80 - 185/71)  BP(mean): --  RR: 18 (11 Feb 2022 05:01) (16 - 20)  SpO2: 94% (11 Feb 2022 05:01) (94% - 99%) I&O's Summary    GENERAL:  [ x]Alert  [ ]Oriented x   [ ]Lethargic  [ ]Cachexia  [ ]Unarousable  [ ]Verbal  [ ]Non-Verbal [ ] Engaging   [  ] Confused  [  ] No distress  Behavioral:   [ ] Anxiety  [ ] Delirium [ ] Agitation [ x] Calm   [  ] Restless [ ] Other  HEENT:  [ x]Normal   [ ]Dry mouth   [ ]ET Tube/Trach  [ ]Oral lesions   [ ] NGT  [ ] Mucositis [ ] Oral  Bleeding  PULMONARY:   [x ]Clear [ ]Tachypnea  [ ]Audible excessive secretions [  ] No tachypnea  [ ]Rhonchi        [ ]Right [ ]Left [ ]Bilateral  [ ]Crackles        [ ]Right [ ]Left [ ]Bilateral  [ ]Wheezing     [ ]Right [ ]Left [ ]Bilateral  [ ]Diminished breath sounds [ ]right [ ]left [ ]bilateral  CARDIOVASCULAR:    [x]Regular [ ]Irregular [ ]Tachy  [ ]Chao [ ]Murmur [ ] JVD  GASTROINTESTINAL:  [x ]Soft  [ ]Distended   [ ]+BS  [ ]Non tender [ ]Tender  [ ]PEG [ ]OGT/ NGT  Last BM:   GENITOURINARY:  [x ]Normal [ ] Incontinent   [ ]Oliguria/Anuria   [ ]Lopez  MUSCULOSKELETAL:   [ ]Normal   [ x]Weakness  [ ]Bed/Wheelchair bound [ ]Edema  NEUROLOGIC:   [ ]No focal deficits  [ ]Cognitive impairment  [ ]Dysphagia [ ]Dysarthria [ ]Paresis [x]Other [place]  SKIN:   [x ]Normal    [ ]Rash  [ ]Pressure ulcer(s)   [  ] Lesion   [    ]  Wounds       Present on admission [ ]y [ ]n    CRITICAL CARE:  [ ] Shock Present  [ ]Septic [ ]Cardiogenic [ ]Neurologic [ ]Hypovolemic  [ ]  Vasopressors [ ]  Inotropes   [ ]Respiratory failure present [ ]Mechanical ventilation [ ]Non-invasive ventilatory support [ ]High flow  [ ]Acute  [ ]Chronic [ ]Hypoxic  [ ]Hypercarbic [ ]Other  [ ]Other organ failure     LABS:                        10.7   5.74  )-----------( 176      ( 10 Feb 2022 16:27 )             33.8   02-10    143  |  108  |  50<H>  ----------------------------<  94  4.9   |  24  |  1.43<H>    Ca    10.7<H>      10 Feb 2022 16:27    TPro  6.2  /  Alb  3.4  /  TBili  0.5  /  DBili  x   /  AST  24  /  ALT  45  /  AlkPhos  165<H>  02-10        RADIOLOGY & ADDITIONAL STUDIES:    PROTEIN CALORIE MALNUTRITION PRESENT: [ ]mild [ ]moderate [ ]severe [ ]underweight [ ]morbid obesity  https://www.andeal.org/vault/2440/web/files/ONC/Table_Clinical%20Characteristics%20to%20Document%20Malnutrition-White%20JV%20et%20al%202012.pdf    Height (cm): 162.6 (02-10-22 @ 15:21)  Weight (kg): 49.9 (02-10-22 @ 15:21)  BMI (kg/m2): 18.9 (02-10-22 @ 15:21)    [ ]PPSV2 < or = to 30% [ ]significant weight loss  [ ]poor nutritional intake  [ ]anasarca      [ ]Artificial Nutrition      REFERRALS:   [ ]Chaplaincy  [ ]Hospice  [ ]Child Life  [ ]Social Work  [ ]Case management [ ]Holistic Therapy     Goals of Care Document:

## 2022-02-11 NOTE — CONSULT NOTE ADULT - PROBLEM SELECTOR RECOMMENDATION 2
Plan: Condition: chronic kidney disease.   Degree: Stage 4   Active treatment: Renally dose agents  Clinical impact on complexity: Significant

## 2022-02-11 NOTE — CONSULT NOTE ADULT - PROBLEM SELECTOR RECOMMENDATION 3
Problem: Anorexia.   Degree of control: moderate  Relative to previous day: comparable  Current treatment regimen: n/a   Recommendations: no pharmacologic strategies for now  Risk mitigation: n/a

## 2022-02-11 NOTE — CONSULT NOTE ADULT - PROBLEM SELECTOR RECOMMENDATION 9
Problem: Dyspnea.   Predominant symptom: shortness of breath  Degree of control: Moderate  Relative to previous day: comparable  Current treatment regimen: 02 via NC  Recommendations: can consider IVP dilaudid 0.2mg PRN  Opioid Education: I explained the role of opioids in symptom management.  They were counseled on strategies for dyspnea tracking and we reviewed the chronicity of the symptoms.  We explored patient reporting of symptoms  and risk mitigation techniques.  Risk mitigation: Judicious use of concurrent agents  Adverse events noted: none

## 2022-02-11 NOTE — PROGRESS NOTE ADULT - PROBLEM SELECTOR PLAN 4
Problem:  Degree:  Status:  Likely due to  Relevant factors:  Rx regimen/treatment strategy:  Consultative team involvement:

## 2022-02-11 NOTE — PROGRESS NOTE ADULT - PROBLEM SELECTOR PLAN 2
-pt family seeking comfort measures only including minimizing FS or unnecessary tests  -palliative consult for hospice evaluation

## 2022-02-11 NOTE — PROGRESS NOTE ADULT - PROBLEM SELECTOR PLAN 3
Predominant symptom:  Likely due to  Degree of control:  Relative to previous day:  Current treatment regimen:  Recommendations:  Risk mitigation:  Adverse events noted:

## 2022-02-11 NOTE — PROGRESS NOTE ADULT - PROBLEM SELECTOR PLAN 6
Actions:  [] Rapport building     [] Symptom assessment    [] Eliciting preferences of goals   [] Prognostic understanding    [] Emotional Support  [] Coping skill development  []  Other  Interdisciplinary Referrals:  Communication:  Documentation Review: [] Primary Team [] Consultants [] Interdisciplinary team  Content:

## 2022-02-11 NOTE — PROGRESS NOTE ADULT - PROBLEM SELECTOR PLAN 1
Carries an unspecified diagnosis of HF. Murmur appreciated on exam.   CXR pending.   Lactate wnl; extremities warm.   O2 for comfort.   TSH and BNP in AM.  Low suspicion for vte as patient has been on DOAC.  We may provide lasix pending TTE and cxr above.

## 2022-02-11 NOTE — PROGRESS NOTE ADULT - SUBJECTIVE AND OBJECTIVE BOX
HPI:  89F PMH Afib on Eliquis,s/p PPM, CKD, diastolic CHF, CVA s/p thrombectomy p/w progressive decline at home. Also with c/o SOB, SHIN, generalized weakness and dec PO intake. Family who have been taking care of pt at home have noticed a gradual decline over the past 3-weeks. Also report that the pt has been very dyspneic and anxious at night time. She recently started using 1-2L  NC  which has increased to 6L. Per son her O2 saturations have been as low as low-80s. She has taken lasix with  no relief. Family states she has to sleep propped up with several pillows.  She has also takes Ativan prn for anxiety. She has also had poor appetite and has been fed mostly soft items like yoghurt. No reports of dec UO or changes in BM. Of note, she had been evaluated by hospice for Hospice by [facility] ~ 1-week ago but was deemed ineligible at the time. Family seeking for comfort measures only and hospice placement    ED course: Afebrile. HDS. Saturating well on 3L NC     (10 Feb 2022 21:04)    PERTINENT PM/SXH:   HTN (Hypertension)    Hypercholesterolemia    Hypercholesterolemia    Hypercholesterolemia    Paroxysmal Atrial Fibrillation    Cerebrovascular accident (CVA)      History of Abdominal Hysterectomy    Hip Fracture    S/P appendectomy      FAMILY HISTORY:  No pertinent family history in first degree relatives      ITEMS NOT CHECKED ARE NOT PRESENT    SOCIAL HISTORY:   Significant other/partner[ ]  Children[ ]  Oriental orthodox/Spirituality:  Substance hx:  [ ]   Tobacco hx:  [ ]   Alcohol hx: [ ]   Home Opioid hx:  [ ] I-Stop Reference No:  Living Situation: [ ]Home  [ ]Long term care  [ ]Rehab [ ]Other    ADVANCE DIRECTIVES:    DNR  MOLST  [ ]  Living Will  [ ]   DECISION MAKER(s):  [ ] Health Care Proxy(s)  [ ] Surrogate(s)  [ ] Guardian           Name(s): Phone Number(s):    BASELINE (I)ADL(s) (prior to admission):  [place]: [ ]Total  [ ] Moderate [ ]Dependent    Allergies    No Known Allergies    Intolerances    MEDICATIONS  (STANDING):  apixaban 2.5 milliGRAM(s) Oral every 12 hours  atorvastatin 40 milliGRAM(s) Oral <User Schedule>  citalopram 10 milliGRAM(s) Oral <User Schedule>  losartan 100 milliGRAM(s) Oral <User Schedule>  metoprolol succinate  milliGRAM(s) Oral <User Schedule>  pantoprazole    Tablet 40 milliGRAM(s) Oral before breakfast    MEDICATIONS  (PRN):  acetaminophen     Tablet .. 650 milliGRAM(s) Oral every 6 hours PRN Temp greater or equal to 38C (100.4F), Mild Pain (1 - 3)  aluminum hydroxide/magnesium hydroxide/simethicone Suspension 30 milliLiter(s) Oral every 4 hours PRN Dyspepsia  melatonin 3 milliGRAM(s) Oral at bedtime PRN Insomnia  ondansetron Injectable 4 milliGRAM(s) IV Push every 8 hours PRN Nausea and/or Vomiting    PRESENT SYMPTOMS: [ ]Unable to obtain due to poor mentation   Source if other than patient:  [ ]Family   [ ]Team [ ] Inferred/Assessed    Pain: [ ]yes [ ]no  QOL impact -   Location -                    Aggravating factors -  Quality -  Radiation -  Timing-  Severity (0-10 scale):  Minimal acceptable level (0-10 scale):     PAIN AD Score:     http://geriatrictoolkit.[facility]/cog/painad.pdf (press ctrl +  left click to view)    [ ] Inferred Symptoms    Fatigue:                             [ ] None  [ ]Mild [ ]Moderate [ ]Severe  Drowsiness:                      [ ] None  [ ]Mild [ ]Moderate [ ]Severe  Nausea:                             [ ] None  [ ]Mild [ ]Moderate [ ]Severe  Dysgeusia:                         [ ] None  [ ]Mild [ ]Moderate [ ]Severe  Loss of appetite:              [ ] None  [ ]Mild [ ]Moderate [ ]Severe  Constipation:                    [ ] None  [ ]Mild [ ]Moderate [ ]Severe  Dyspnea:                           [ ] None  [ ]Mild [ ]Moderate [ ]Severe  Anxiety:                             [ ] None  [ ]Mild [ ]Moderate [ ]Severe  Depression:                      [ ] None  [ ]Mild [ ]Moderate [ ]Severe  Cognitive changes:          [ ] None  [ ]Mild [ ]Moderate [ ]Severe  Insomnia      :                    [ ] None  [ ]Mild [ ]Moderate [ ]Severe  Isolation:                           [ ] None  [ ]Mild [ ]Moderate [ ]Severe  Loneliness:                        [ ] None  [ ]Mild [ ]Moderate [ ]Severe  Lack of   Wellbeing:                        [ ] None  [ ]Mild [ ]Moderate [ ]Severe    Other Symptoms:  [ ]All other review of systems negative     Palliative Performance Status Version 2:         %    http://npcrc.org/files/news/palliative_performance_scale_ppsv2.pdf  PHYSICAL EXAM:  Vital Signs Last 24 Hrs  T(C): 36.5 (11 Feb 2022 05:01), Max: 36.7 (10 Feb 2022 15:21)  T(F): 97.7 (11 Feb 2022 05:01), Max: 98 (10 Feb 2022 15:21)  HR: 93 (11 Feb 2022 05:01) (70 - 93)  BP: 139/73 (11 Feb 2022 05:01) (120/80 - 185/71)  BP(mean): --  RR: 18 (11 Feb 2022 05:01) (16 - 20)  SpO2: 94% (11 Feb 2022 05:01) (94% - 99%) I&O's Summary    GENERAL:  [ ]Alert  [ ]Oriented x   [ ]Lethargic  [ ]Cachexia  [ ]Unarousable  [ ]Verbal  [ ]Non-Verbal [ ] Engaging   [  ] Confused  [  ] No distress  Behavioral:   [ ] Anxiety  [ ] Delirium [ ] Agitation [ ] Calm   [  ] Restless [ ] Other  HEENT:  [ ]Normal   [ ]Dry mouth   [ ]ET Tube/Trach  [ ]Oral lesions   [ ] NGT  [ ] Mucositis [ ] Oral  Bleeding  PULMONARY:   [ ]Clear [ ]Tachypnea  [ ]Audible excessive secretions [  ] No tachypnea  [ ]Rhonchi        [ ]Right [ ]Left [ ]Bilateral  [ ]Crackles        [ ]Right [ ]Left [ ]Bilateral  [ ]Wheezing     [ ]Right [ ]Left [ ]Bilateral  [ ]Diminished breath sounds [ ]right [ ]left [ ]bilateral  CARDIOVASCULAR:    [ ]Regular [ ]Irregular [ ]Tachy  [ ]Chao [ ]Murmur [ ] JVD  GASTROINTESTINAL:  [ ]Soft  [ ]Distended   [ ]+BS  [ ]Non tender [ ]Tender  [ ]PEG [ ]OGT/ NGT  Last BM:   GENITOURINARY:  [ ]Normal [ ] Incontinent   [ ]Oliguria/Anuria   [ ]Lopez  MUSCULOSKELETAL:   [ ]Normal   [ ]Weakness  [ ]Bed/Wheelchair bound [ ]Edema  NEUROLOGIC:   [ ]No focal deficits  [ ]Cognitive impairment  [ ]Dysphagia [ ]Dysarthria [ ]Paresis [ ]Other   SKIN:   [ ]Normal    [ ]Rash  [ ]Pressure ulcer(s)   [  ] Lesion   [    ]  Wounds       Present on admission [ ]y [ ]n    CRITICAL CARE:  [ ] Shock Present  [ ]Septic [ ]Cardiogenic [ ]Neurologic [ ]Hypovolemic  [ ]  Vasopressors [ ]  Inotropes   [ ]Respiratory failure present [ ]Mechanical ventilation [ ]Non-invasive ventilatory support [ ]High flow  [ ]Acute  [ ]Chronic [ ]Hypoxic  [ ]Hypercarbic [ ]Other  [ ]Other organ failure     LABS:                        10.7   5.74  )-----------( 176      ( 10 Feb 2022 16:27 )             33.8   02-10    143  |  108  |  50<H>  ----------------------------<  94  4.9   |  24  |  1.43<H>    Ca    10.7<H>      10 Feb 2022 16:27    TPro  6.2  /  Alb  3.4  /  TBili  0.5  /  DBili  x   /  AST  24  /  ALT  45  /  AlkPhos  165<H>  02-10        RADIOLOGY & ADDITIONAL STUDIES:    PROTEIN CALORIE MALNUTRITION PRESENT: [ ]mild [ ]moderate [ ]severe [ ]underweight [ ]morbid obesity  https://www.andeal.org/vault/2440/web/files/ONC/Table_Clinical%20Characteristics%20to%20Document%20Malnutrition-White%20JV%20et%20al%202012.pdf    Height (cm): 162.6 (02-10-22 @ 15:21)  Weight (kg): 49.9 (02-10-22 @ 15:21)  BMI (kg/m2): 18.9 (02-10-22 @ 15:21)    [ ]PPSV2 < or = to 30% [ ]significant weight loss  [ ]poor nutritional intake  [ ]anasarca      [ ]Artificial Nutrition      REFERRALS:   [ ]Chaplaincy  [ ]Hospice  [ ]Child Life  [ ]Social Work  [ ]Case management [ ]Holistic Therapy     Goals of Care Document:

## 2022-02-12 LAB
ANION GAP SERPL CALC-SCNC: 13 MMOL/L — SIGNIFICANT CHANGE UP (ref 5–17)
BUN SERPL-MCNC: 49 MG/DL — HIGH (ref 7–23)
CALCIUM SERPL-MCNC: 10.5 MG/DL — SIGNIFICANT CHANGE UP (ref 8.4–10.5)
CHLORIDE SERPL-SCNC: 105 MMOL/L — SIGNIFICANT CHANGE UP (ref 96–108)
CO2 SERPL-SCNC: 25 MMOL/L — SIGNIFICANT CHANGE UP (ref 22–31)
CREAT SERPL-MCNC: 1.71 MG/DL — HIGH (ref 0.5–1.3)
GLUCOSE SERPL-MCNC: 71 MG/DL — SIGNIFICANT CHANGE UP (ref 70–99)
MAGNESIUM SERPL-MCNC: 1.7 MG/DL — SIGNIFICANT CHANGE UP (ref 1.6–2.6)
NT-PROBNP SERPL-SCNC: 7805 PG/ML — HIGH (ref 0–300)
PHOSPHATE SERPL-MCNC: 3.4 MG/DL — SIGNIFICANT CHANGE UP (ref 2.5–4.5)
POTASSIUM SERPL-MCNC: 4.7 MMOL/L — SIGNIFICANT CHANGE UP (ref 3.5–5.3)
POTASSIUM SERPL-SCNC: 4.7 MMOL/L — SIGNIFICANT CHANGE UP (ref 3.5–5.3)
SODIUM SERPL-SCNC: 143 MMOL/L — SIGNIFICANT CHANGE UP (ref 135–145)
TSH SERPL-MCNC: 4.01 UIU/ML — SIGNIFICANT CHANGE UP (ref 0.27–4.2)

## 2022-02-12 PROCEDURE — 99232 SBSQ HOSP IP/OBS MODERATE 35: CPT

## 2022-02-12 PROCEDURE — 99233 SBSQ HOSP IP/OBS HIGH 50: CPT

## 2022-02-12 RX ORDER — FUROSEMIDE 40 MG
20 TABLET ORAL DAILY
Refills: 0 | Status: DISCONTINUED | OUTPATIENT
Start: 2022-02-12 | End: 2022-02-13

## 2022-02-12 RX ORDER — METOPROLOL TARTRATE 50 MG
100 TABLET ORAL
Refills: 0 | Status: DISCONTINUED | OUTPATIENT
Start: 2022-02-12 | End: 2022-02-24

## 2022-02-12 RX ORDER — POLYETHYLENE GLYCOL 3350 17 G/17G
17 POWDER, FOR SOLUTION ORAL DAILY
Refills: 0 | Status: DISCONTINUED | OUTPATIENT
Start: 2022-02-12 | End: 2022-02-20

## 2022-02-12 RX ADMIN — Medication 100 MILLIGRAM(S): at 21:04

## 2022-02-12 RX ADMIN — LOSARTAN POTASSIUM 100 MILLIGRAM(S): 100 TABLET, FILM COATED ORAL at 09:19

## 2022-02-12 RX ADMIN — APIXABAN 2.5 MILLIGRAM(S): 2.5 TABLET, FILM COATED ORAL at 21:05

## 2022-02-12 RX ADMIN — APIXABAN 2.5 MILLIGRAM(S): 2.5 TABLET, FILM COATED ORAL at 00:20

## 2022-02-12 RX ADMIN — PANTOPRAZOLE SODIUM 40 MILLIGRAM(S): 20 TABLET, DELAYED RELEASE ORAL at 09:18

## 2022-02-12 RX ADMIN — Medication 20 MILLIGRAM(S): at 13:03

## 2022-02-12 RX ADMIN — Medication 100 MILLIGRAM(S): at 09:18

## 2022-02-12 RX ADMIN — APIXABAN 2.5 MILLIGRAM(S): 2.5 TABLET, FILM COATED ORAL at 09:18

## 2022-02-12 RX ADMIN — ATORVASTATIN CALCIUM 40 MILLIGRAM(S): 80 TABLET, FILM COATED ORAL at 21:04

## 2022-02-12 RX ADMIN — CITALOPRAM 10 MILLIGRAM(S): 10 TABLET, FILM COATED ORAL at 21:04

## 2022-02-12 NOTE — PROGRESS NOTE ADULT - SUBJECTIVE AND OBJECTIVE BOX
[facility]   [phone]  RENAL FOLLOW UP NOTE  --------------------------------------------------------------------------------  Chief Complaint:    24 hour events/subjective:    seen earlier   pt grand daughter at bedside   pt states overall is fatigue     PAST HISTORY  --------------------------------------------------------------------------------  No significant changes to PMH, PSH, FHx, SHx, unless otherwise noted    ALLERGIES & MEDICATIONS  --------------------------------------------------------------------------------  Allergies    No Known Allergies    Intolerances      Standing Inpatient Medications  apixaban 2.5 milliGRAM(s) Oral every 12 hours  atorvastatin 40 milliGRAM(s) Oral <User Schedule>  citalopram 10 milliGRAM(s) Oral <User Schedule>  furosemide   Injectable 20 milliGRAM(s) IV Push daily  metoprolol succinate  milliGRAM(s) Oral <User Schedule>  pantoprazole    Tablet 40 milliGRAM(s) Oral before breakfast  polyethylene glycol 3350 17 Gram(s) Oral daily    PRN Inpatient Medications  acetaminophen     Tablet .. 650 milliGRAM(s) Oral every 6 hours PRN  aluminum hydroxide/magnesium hydroxide/simethicone Suspension 30 milliLiter(s) Oral every 4 hours PRN  melatonin 3 milliGRAM(s) Oral at bedtime PRN  ondansetron Injectable 4 milliGRAM(s) IV Push every 8 hours PRN      REVIEW OF SYSTEMS  --------------------------------------------------------------------------------    Gen: denies chills,  CVS: denies chest pain/palpitations  Resp: denies SOB/Cough  GI: Denies N/V/Abd pain  : Denies dysuria        VITALS/PHYSICAL EXAM  --------------------------------------------------------------------------------  T(C): 36.9 (02-12-22 @ 13:56), Max: 37 (02-12-22 @ 05:08)  HR: 89 (02-12-22 @ 13:56) (73 - 89)  BP: 128/73 (02-12-22 @ 13:56) (117/66 - 158/66)  RR: 18 (02-12-22 @ 13:56) (18 - 18)  SpO2: 95% (02-12-22 @ 13:56) (95% - 98%)  Wt(kg): --        02-11-22 @ 07:01  -  02-12-22 @ 07:00  --------------------------------------------------------  IN: 360 mL / OUT: 400 mL / NET: -40 mL    02-12-22 @ 07:01  -  02-12-22 @ 20:10  --------------------------------------------------------  IN: 210 mL / OUT: 400 mL / NET: -190 mL      Physical Exam:  	    	Gen: thin elderly F  on O2   	no jvd   	Pulm: decrease bs  no rales or ronchi or wheezing  	CV: RRR, S1S2; no rub  	Abd: +BS, soft, nontender/nondistended  	: No suprapubic tenderness  	UE: Warm, no cyanosis  no clubbing,  no edema  	LE: Warm, no cyanosis  no clubbing, no edema    LABS/STUDIES  --------------------------------------------------------------------------------    143  |  105  |  49  ----------------------------<  71      [02-12-22 @ 07:14]  4.7   |  25  |  1.71        Ca     10.5     [02-12-22 @ 07:14]      Mg     1.7     [02-12-22 @ 07:14]      Phos  3.4     [02-12-22 @ 07:14]            Creatinine Trend:  SCr 1.71 [02-12 @ 07:14]  SCr 1.43 [02-10 @ 16:27]                  TSH 4.01      [02-12-22 @ 10:11]

## 2022-02-12 NOTE — PROGRESS NOTE ADULT - SUBJECTIVE AND OBJECTIVE BOX
INTERNAL MEDICINE PROGRESS NOTE    Dr. Rupert Koenig, DO  Hospitalist  Division of Hospital Medicine  [facility]  Available via Microsoft Teams      SUBJECTIVE:  No acute complaints.     REVIEW OF SYSTEMS   12 point review of systems negative except for above.     PAST MEDICAL & SURGICAL HISTORY:  HTN (Hypertension)    Hypercholesterolemia    Paroxysmal Atrial Fibrillation    Cerebrovascular accident (CVA)  MCA infarct sp thrombectomy    History of Abdominal Hysterectomy    Hip Fracture  s/p hip pinning    S/P appendectomy  2010        MEDICATIONS  (STANDING):  apixaban 2.5 milliGRAM(s) Oral every 12 hours  atorvastatin 40 milliGRAM(s) Oral <User Schedule>  citalopram 10 milliGRAM(s) Oral <User Schedule>  furosemide   Injectable 20 milliGRAM(s) IV Push daily  metoprolol succinate  milliGRAM(s) Oral <User Schedule>  pantoprazole    Tablet 40 milliGRAM(s) Oral before breakfast  polyethylene glycol 3350 17 Gram(s) Oral daily    MEDICATIONS  (PRN):  acetaminophen     Tablet .. 650 milliGRAM(s) Oral every 6 hours PRN Temp greater or equal to 38C (100.4F), Mild Pain (1 - 3)  aluminum hydroxide/magnesium hydroxide/simethicone Suspension 30 milliLiter(s) Oral every 4 hours PRN Dyspepsia  melatonin 3 milliGRAM(s) Oral at bedtime PRN Insomnia  ondansetron Injectable 4 milliGRAM(s) IV Push every 8 hours PRN Nausea and/or Vomiting      Allergies    No Known Allergies    Intolerances        T(C): 37 (02-12-22 @ 05:08), Max: 37 (02-12-22 @ 05:08)  T(F): 98.6 (02-12-22 @ 05:08), Max: 98.6 (02-12-22 @ 05:08)  HR: 76 (02-12-22 @ 09:13) (72 - 76)  BP: 158/66 (02-12-22 @ 09:13) (117/66 - 158/66)  ABP: --  ABP(mean): --  RR: 18 (02-12-22 @ 05:08) (18 - 18)  SpO2: 98% (02-12-22 @ 05:08) (97% - 98%)      CONSTITUTIONAL: No acute distress.   HEENT:  Conjunctiva clear B/L.  Moist oral mucosa.   Cardiovascular: RRR with systolic murmur. No JVD noted. No lower extremity edema B/L. Extremities are warm and well perfused.   Respiratory: Dec bs. No wrr. No accessory muscle use.   Gastrointestinal:  Soft, nontender. Non-distended. Non-rigid. No CVA tenderness B/L.  MSK:  No joint swelling. No joint erythema B/L. No midline spinal tenderness.  Neurologic:  Alert and awake. Moving all extremities. Following commands. Making eye contact.    Skin:  No rashes noted. No skin erythema noted.   Psych:  Normal affect. Normal Mood.     LABS                        10.7   5.74  )-----------( 176      ( 10 Feb 2022 16:27 )             33.8     02-12    143  |  105  |  49<H>  ----------------------------<  71  4.7   |  25  |  1.71<H>    Ca    10.5      12 Feb 2022 07:14  Phos  3.4     02-12  Mg     1.7     02-12    TPro  6.2  /  Alb  3.4  /  TBili  0.5  /  DBili  x   /  AST  24  /  ALT  45  /  AlkPhos  165<H>  02-10          ALL RECENT STUDIES REVIEWED INCLUDING REPORTS AVAILABLE     CARE DISCUSSED WITH ALL CONSULTANTS

## 2022-02-12 NOTE — PROGRESS NOTE ADULT - SUBJECTIVE AND OBJECTIVE BOX
HPI:  89F PMH Afib on Eliquis,s/p PPM, CKD, diastolic CHF, CVA s/p thrombectomy p/w progressive decline at home. Also with c/o SOB, SHIN, generalized weakness and dec PO intake. Family who have been taking care of pt at home have noticed a gradual decline over the past 3-weeks. Also report that the pt has been very dyspneic and anxious at night time. She recently started using 1-2L  NC  which has increased to 6L. Per son her O2 saturations have been as low as low-80s. She has taken lasix with  no relief. Family states she has to sleep propped up with several pillows.  She has also takes Ativan prn for anxiety. She has also had poor appetite and has been fed mostly soft items like yoghurt. No reports of dec UO or changes in BM. Of note, she had been evaluated by hospice for Hospice by [facility] ~ 1-week ago but was deemed ineligible at the time. Family seeking for comfort measures only and hospice placement              02-12-22 @ 10:09  [facility] [id number]    Overnight events: Echo completed  Staff reports: n/a  Patient: Patient reports significant improvement  PRN use: none        RECENT VITALS/LABS/MEDICATIONS/ASSAYS     02-12-22 @ 10:09    T(C): 37 (02-12-22 @ 05:08), Max: 37 (02-12-22 @ 05:08)  HR: 76 (02-12-22 @ 09:13) (72 - 82)  BP: 158/66 (02-12-22 @ 09:13) (117/66 - 158/66)  RR: 18 (02-12-22 @ 05:08) (18 - 18)  SpO2: 98% (02-12-22 @ 05:08) (96% - 98%)  Wt(kg): --      11 Feb 2022 07:01  -  12 Feb 2022 07:00  --------------------------------------------------------  IN:    Oral Fluid: 360 mL  Total IN: 360 mL    OUT:    Voided (mL): 400 mL  Total OUT: 400 mL    Total NET: -40 mL      12 Feb 2022 07:01  -  12 Feb 2022 10:09  --------------------------------------------------------  IN:    Oral Fluid: 90 mL  Total IN: 90 mL    OUT:  Total OUT: 0 mL    Total NET: 90 mL          02-11 @ 07:01 - 02-12 @ 07:00  --------------------------------------------------------  IN: 360 mL / OUT: 400 mL / NET: -40 mL    02-12 @ 07:01  - 02-12 @ 10:09  --------------------------------------------------------  IN: 90 mL / OUT: 0 mL / NET: 90 mL      CAPILLARY BLOOD GLUCOSE            acetaminophen     Tablet .. 650 milliGRAM(s) Oral every 6 hours PRN  aluminum hydroxide/magnesium hydroxide/simethicone Suspension 30 milliLiter(s) Oral every 4 hours PRN  apixaban 2.5 milliGRAM(s) Oral every 12 hours  atorvastatin 40 milliGRAM(s) Oral <User Schedule>  citalopram 10 milliGRAM(s) Oral <User Schedule>  losartan 100 milliGRAM(s) Oral <User Schedule>  melatonin 3 milliGRAM(s) Oral at bedtime PRN  metoprolol succinate  milliGRAM(s) Oral <User Schedule>  ondansetron Injectable 4 milliGRAM(s) IV Push every 8 hours PRN  pantoprazole    Tablet 40 milliGRAM(s) Oral before breakfast          02-12    143  |  105  |  49<H>  ----------------------------<  71  4.7   |  25  |  1.71<H>    Ca    10.5      12 Feb 2022 07:14  Phos  3.4     02-12  Mg     1.7     02-12    TPro  6.2  /  Alb  3.4  /  TBili  0.5  /  DBili  x   /  AST  24  /  ALT  45  /  AlkPhos  165<H>  02-10      Procalc  BNPSerum Pro-Brain Natriuretic Peptide: 7805 pg/mL (02-12-22 @ 07:14)    ABG                          10.7   5.74  )-----------( 176      ( 10 Feb 2022 16:27 )             33.8           blood and urine cultures          ED course: Afebrile. HDS. Saturating well on 3L NC     (10 Feb 2022 21:04)    PERTINENT PM/SXH:   HTN (Hypertension)    Hypercholesterolemia    Hypercholesterolemia    Hypercholesterolemia    Paroxysmal Atrial Fibrillation    Cerebrovascular accident (CVA)      History of Abdominal Hysterectomy    Hip Fracture    S/P appendectomy      FAMILY HISTORY:  No pertinent family history in first degree relatives      ITEMS NOT CHECKED ARE NOT PRESENT    SOCIAL HISTORY:   Significant other/partner[ ]  Children[ x]  Shinto/Spirituality:  Substance hx:  [ ]   Tobacco hx:  [ ]   Alcohol hx: [ ]   Home Opioid hx:  [ ] I-Stop Reference No:  Living Situation: [ ]Home  [ ]Long term care  [ ]Rehab [ ]Other    ADVANCE DIRECTIVES:    DNR  MOLST  [x ]  Living Will  [ ]   DECISION MAKER(s):  [ ] Health Care Proxy(s)  [ ] Surrogate(s)  [ ] Guardian           Name(s): Phone Number(s):    BASELINE (I)ADL(s) (prior to admission):  [place]: [ ]Total  [ ] Moderate [ ]Dependent    Allergies    No Known Allergies    Intolerances    MEDICATIONS  (STANDING):  apixaban 2.5 milliGRAM(s) Oral every 12 hours  atorvastatin 40 milliGRAM(s) Oral <User Schedule>  citalopram 10 milliGRAM(s) Oral <User Schedule>  losartan 100 milliGRAM(s) Oral <User Schedule>  metoprolol succinate  milliGRAM(s) Oral <User Schedule>  pantoprazole    Tablet 40 milliGRAM(s) Oral before breakfast    MEDICATIONS  (PRN):  acetaminophen     Tablet .. 650 milliGRAM(s) Oral every 6 hours PRN Temp greater or equal to 38C (100.4F), Mild Pain (1 - 3)  aluminum hydroxide/magnesium hydroxide/simethicone Suspension 30 milliLiter(s) Oral every 4 hours PRN Dyspepsia  melatonin 3 milliGRAM(s) Oral at bedtime PRN Insomnia  ondansetron Injectable 4 milliGRAM(s) IV Push every 8 hours PRN Nausea and/or Vomiting    PRESENT SYMPTOMS: [ ]Unable to obtain due to poor mentation   Source if other than patient:  [ ]Family   [ ]Team [ ] Inferred/Assessed    Pain: [ ]yes [x ]no  QOL impact -   Location -                    Aggravating factors -  Quality -  Radiation -  Timing-  Severity (0-10 scale):  Minimal acceptable level (0-10 scale):     PAIN AD Score:     http://geriatrictoolkit.[facility]/cog/painad.pdf (press ctrl +  left click to view)    [ ] Inferred Symptoms    Fatigue:                             [ ] None  [x ]Mild [ ]Moderate [ ]Severe  Drowsiness:                      [ x] None  [ ]Mild [ ]Moderate [ ]Severe  Nausea:                             [x ] None  [ ]Mild [ ]Moderate [ ]Severe  Dysgeusia:                         [x ] None  [ ]Mild [ ]Moderate [ ]Severe  Loss of appetite:              [ ] None  [x ]Mild [ ]Moderate [ ]Severe  Constipation:                    [ x] None  [ ]Mild [ ]Moderate [ ]Severe  Dyspnea:                           [ ] None  [ ]Mild [x ]Moderate [ ]Severe  Anxiety:                             [ ] None  [ ]Mild [ ]Moderate [ ]Severe  Depression:                      [ ] None  [ ]Mild [ ]Moderate [ ]Severe  Cognitive changes:          [ ] None  [ ]Mild [ ]Moderate [ ]Severe  Insomnia      :                    [ ] None  [ ]Mild [ ]Moderate [ ]Severe  Isolation:                           [ ] None  [ ]Mild [ ]Moderate [ ]Severe  Loneliness:                        [ ] None  [ ]Mild [ ]Moderate [ ]Severe  Lack of   Wellbeing:                        [ ] None  [ ]Mild [ ]Moderate [ ]Severe    Other Symptoms:  [x ]All other review of systems negative     Palliative Performance Status Version 2:   50    %    http://npcrc.org/files/news/palliative_performance_scale_ppsv2.pdf  PHYSICAL EXAM:  Vital Signs Last 24 Hrs  T(C): 36.5 (11 Feb 2022 05:01), Max: 36.7 (10 Feb 2022 15:21)  T(F): 97.7 (11 Feb 2022 05:01), Max: 98 (10 Feb 2022 15:21)  HR: 93 (11 Feb 2022 05:01) (70 - 93)  BP: 139/73 (11 Feb 2022 05:01) (120/80 - 185/71)  BP(mean): --  RR: 18 (11 Feb 2022 05:01) (16 - 20)  SpO2: 94% (11 Feb 2022 05:01) (94% - 99%) I&O's Summary    GENERAL:  [ x]Alert  [ ]Oriented x   [ ]Lethargic  [ ]Cachexia  [ ]Unarousable  [ ]Verbal  [ ]Non-Verbal [ ] Engaging   [  ] Confused  [  ] No distress  Behavioral:   [ ] Anxiety  [ ] Delirium [ ] Agitation [ x] Calm   [  ] Restless [ ] Other  HEENT:  [ x]Normal   [ ]Dry mouth   [ ]ET Tube/Trach  [ ]Oral lesions   [ ] NGT  [ ] Mucositis [ ] Oral  Bleeding  PULMONARY:   [x ]Clear [ ]Tachypnea  [ ]Audible excessive secretions [  ] No tachypnea  [ ]Rhonchi        [ ]Right [ ]Left [ ]Bilateral  [ ]Crackles        [ ]Right [ ]Left [ ]Bilateral  [ ]Wheezing     [ ]Right [ ]Left [ ]Bilateral  [ ]Diminished breath sounds [ ]right [ ]left [ ]bilateral  CARDIOVASCULAR:    [x]Regular [ ]Irregular [ ]Tachy  [ ]Chao [ ]Murmur [ ] JVD  GASTROINTESTINAL:  [x ]Soft  [ ]Distended   [ ]+BS  [ ]Non tender [ ]Tender  [ ]PEG [ ]OGT/ NGT  Last BM:   GENITOURINARY:  [x ]Normal [ ] Incontinent   [ ]Oliguria/Anuria   [ ]Lopez  MUSCULOSKELETAL:   [ ]Normal   [ x]Weakness  [ ]Bed/Wheelchair bound [ ]Edema  NEUROLOGIC:   [ ]No focal deficits  [ ]Cognitive impairment  [ ]Dysphagia [ ]Dysarthria [ ]Paresis [x]Other Koi  SKIN:   [x ]Normal    [ ]Rash  [ ]Pressure ulcer(s)   [  ] Lesion   [    ]  Wounds       Present on admission [ ]y [ ]n    CRITICAL CARE:  [ ] Shock Present  [ ]Septic [ ]Cardiogenic [ ]Neurologic [ ]Hypovolemic  [ ]  Vasopressors [ ]  Inotropes   [ ]Respiratory failure present [ ]Mechanical ventilation [ ]Non-invasive ventilatory support [ ]High flow  [ ]Acute  [ ]Chronic [ ]Hypoxic  [ ]Hypercarbic [ ]Other  [ ]Other organ failure     LABS:                        10.7   5.74  )-----------( 176      ( 10 Feb 2022 16:27 )             33.8   02-10    143  |  108  |  50<H>  ----------------------------<  94  4.9   |  24  |  1.43<H>    Ca    10.7<H>      10 Feb 2022 16:27    TPro  6.2  /  Alb  3.4  /  TBili  0.5  /  DBili  x   /  AST  24  /  ALT  45  /  AlkPhos  165<H>  02-10        RADIOLOGY & ADDITIONAL STUDIES:    PROTEIN CALORIE MALNUTRITION PRESENT: [ ]mild [ ]moderate [ ]severe [ ]underweight [ ]morbid obesity  https://www.andeal.org/vault/2440/web/files/ONC/Table_Clinical%20Characteristics%20to%20Document%20Malnutrition-White%20JV%20et%20al%202012.pdf    Height (cm): 162.6 (02-10-22 @ 15:21)  Weight (kg): 49.9 (02-10-22 @ 15:21)  BMI (kg/m2): 18.9 (02-10-22 @ 15:21)    [ ]PPSV2 < or = to 30% [ ]significant weight loss  [ ]poor nutritional intake  [ ]anasarca      [ ]Artificial Nutrition      REFERRALS:   [ ]Chaplaincy  [ ]Hospice  [ ]Child Life  [ ]Social Work  [ ]Case management [ ]Holistic Therapy     Goals of Care Document:  2+

## 2022-02-12 NOTE — PROGRESS NOTE ADULT - ASSESSMENT
89F PMH Afib on Eliquis,s/p PPM, renovascular htn with atrophic kidney, CKD, diastolic CHF, CVA s/p thrombectomy p/w progressive decline at home. Also with c/o SOB, SHIN, generalized weakness and dec PO intake.     1- CKD III   2- renovascular HTN   3- atrophic kidney   4- shortness of breath     creatinine is steady in this pt   fluid overloaded   hold losartan and gentle diurese   O2   d/w primary team

## 2022-02-12 NOTE — PROGRESS NOTE ADULT - PROBLEM SELECTOR PLAN 1
acute on chronic diastolic HF  low flow AS noted  Lactate wnl; extremities warm.   Low suspicion for vte as patient has been on DOAC.  C/w lasix 20 mg ivp qd.  Hold losartan to allow bp room for lasix; also w/ uptrend in cr.

## 2022-02-13 LAB
ANION GAP SERPL CALC-SCNC: 15 MMOL/L — SIGNIFICANT CHANGE UP (ref 5–17)
BUN SERPL-MCNC: 50 MG/DL — HIGH (ref 7–23)
CALCIUM SERPL-MCNC: 10.3 MG/DL — SIGNIFICANT CHANGE UP (ref 8.4–10.5)
CHLORIDE SERPL-SCNC: 107 MMOL/L — SIGNIFICANT CHANGE UP (ref 96–108)
CO2 SERPL-SCNC: 25 MMOL/L — SIGNIFICANT CHANGE UP (ref 22–31)
CREAT SERPL-MCNC: 1.93 MG/DL — HIGH (ref 0.5–1.3)
GLUCOSE SERPL-MCNC: 80 MG/DL — SIGNIFICANT CHANGE UP (ref 70–99)
MAGNESIUM SERPL-MCNC: 1.6 MG/DL — SIGNIFICANT CHANGE UP (ref 1.6–2.6)
POTASSIUM SERPL-MCNC: 4.3 MMOL/L — SIGNIFICANT CHANGE UP (ref 3.5–5.3)
POTASSIUM SERPL-SCNC: 4.3 MMOL/L — SIGNIFICANT CHANGE UP (ref 3.5–5.3)
SODIUM SERPL-SCNC: 147 MMOL/L — HIGH (ref 135–145)

## 2022-02-13 PROCEDURE — 99233 SBSQ HOSP IP/OBS HIGH 50: CPT

## 2022-02-13 RX ORDER — MAGNESIUM SULFATE 500 MG/ML
1 VIAL (ML) INJECTION ONCE
Refills: 0 | Status: COMPLETED | OUTPATIENT
Start: 2022-02-13 | End: 2022-02-13

## 2022-02-13 RX ORDER — PETROLATUM,WHITE
1 JELLY (GRAM) TOPICAL DAILY
Refills: 0 | Status: DISCONTINUED | OUTPATIENT
Start: 2022-02-13 | End: 2022-02-24

## 2022-02-13 RX ADMIN — PANTOPRAZOLE SODIUM 40 MILLIGRAM(S): 20 TABLET, DELAYED RELEASE ORAL at 15:54

## 2022-02-13 RX ADMIN — APIXABAN 2.5 MILLIGRAM(S): 2.5 TABLET, FILM COATED ORAL at 23:51

## 2022-02-13 RX ADMIN — Medication 100 MILLIGRAM(S): at 09:30

## 2022-02-13 RX ADMIN — Medication 20 MILLIGRAM(S): at 05:41

## 2022-02-13 RX ADMIN — POLYETHYLENE GLYCOL 3350 17 GRAM(S): 17 POWDER, FOR SOLUTION ORAL at 17:24

## 2022-02-13 RX ADMIN — Medication 1 APPLICATION(S): at 17:42

## 2022-02-13 RX ADMIN — Medication 25 GRAM(S): at 11:37

## 2022-02-13 RX ADMIN — APIXABAN 2.5 MILLIGRAM(S): 2.5 TABLET, FILM COATED ORAL at 11:37

## 2022-02-13 RX ADMIN — Medication 100 MILLIGRAM(S): at 21:13

## 2022-02-13 RX ADMIN — CITALOPRAM 10 MILLIGRAM(S): 10 TABLET, FILM COATED ORAL at 21:13

## 2022-02-13 RX ADMIN — ATORVASTATIN CALCIUM 40 MILLIGRAM(S): 80 TABLET, FILM COATED ORAL at 21:13

## 2022-02-13 NOTE — PROGRESS NOTE ADULT - PROBLEM SELECTOR PLAN 1
acute on chronic diastolic HF  low flow AS noted  Lactate wnl; extremities warm.   Low suspicion for vte as patient has been on DOAC.  S/p lasix 20 mg ivp qd. Pt hyperNa with worsening Cr. D/w nephro -- we favor providing diuretics PRN at this time.   Our focus is on comfort which is in line with the patient and families GOC.   Hold losartan

## 2022-02-13 NOTE — SWALLOW BEDSIDE ASSESSMENT ADULT - SWALLOW EVAL: DIAGNOSIS
Consult order received and chart reviewed.  Per d/w ACP, the Pt is being followed by Palliative care with Hospice placement pending as Pt's family wishes for comfort measures.  Therefore, swallowing evaluation deferred on this date. Consult order received and chart reviewed.  Per d/w ACP, the Pt is being followed by Palliative care with Hospice placement pending as Pt's family wishes for comfort measures.  Therefore, swallowing evaluation deferred on this date.  SLP to f/u at a later date, as appropriate. Consult order received and chart reviewed.  Per d/w ACP, the Pt is being followed by Palliative care with Hospice placement pending as Pt's family wishes for comfort measures.  Therefore, swallowing evaluation deferred at this time.  SLP to f/u at a later date, as appropriate. Pt presents with mild oral prep/oral stage with suspected pharyngeal stage deficits.  Coughing post swallow with thin liquids may be s/s of laryngeal penetration/aspiration.  No s/s of laryngeal penetration or aspiration evident with nectar thickened liquids, purees or soft solids.  Pt took small bites of solids with mildly prolonged mastication.  Pharyngeal swallow judged to be timely.

## 2022-02-13 NOTE — SWALLOW BEDSIDE ASSESSMENT ADULT - SWALLOW EVAL: CRITERIA FOR SKILLED INTERVENTION MET
Deferred at this time, as Pt/family/HCP (d/w daughter Vanda) do not wish to pursue objective swallowing assessment.

## 2022-02-13 NOTE — SWALLOW BEDSIDE ASSESSMENT ADULT - PHARYNGEAL PHASE
Cough post oral intake No signs or symptoms of laryngeal penetration/aspiration evident with any consistency presented.  Pharyngeal swallow judged to be timely with adequate laryngeal elevation.

## 2022-02-13 NOTE — SWALLOW BEDSIDE ASSESSMENT ADULT - COMMENTS
Pt's family seeking hospice and comfort measures only. Pt's family seeking hospice and comfort measures only.  Consult order received and chart reviewed.  Per d/w ACP, the Pt is being followed by Palliative care with Hospice placement pending as Pt's family wishes for comfort measures.  Per SLP d/w Pt's daughter, she consents to b/s swallowing assessment at b/s on this date. Pt's family seeking hospice and comfort measures only.  Consult order received and chart reviewed.  Per d/w ACP, the Pt is being followed by Palliative care with Hospice placement pending as Pt's family wishes for comfort measures.  Per SLP d/w Pt's daughter, she consents to b/s swallowing assessment at b/s on this date.  2/11/22 CXR IMPRESSION: Bilateral pleural effusions with associated lower lung atelectasis. Pt reports decreased appetite

## 2022-02-13 NOTE — SWALLOW BEDSIDE ASSESSMENT ADULT - ORAL PREPARATORY PHASE
Pt took small bites; anterior oral cavity; mildly prolonged mastication/Reduced oral grading Within functional limits

## 2022-02-13 NOTE — SWALLOW BEDSIDE ASSESSMENT ADULT - ASR SWALLOW RECOMMEND DIAG
Pt/family/HCP do not wish to proceed with objective testing at this time; s/s, risks and precautions for aspiration discussed.  Family wishing for comfort measures./VFSS/MBS

## 2022-02-13 NOTE — SWALLOW BEDSIDE ASSESSMENT ADULT - DIET PRIOR TO ADMI
unknown Softer foods with nectar thickened liquids as Pt reportedly coughing on water PTA; family reports h/o dysphagia following CVA with use of thickened liquids for a brief time with eventual diet upgrade

## 2022-02-13 NOTE — SWALLOW BEDSIDE ASSESSMENT ADULT - SLP GENERAL OBSERVATIONS
Pt asleep in bed; verbal and tactile cues required to arouse Pt; Grandson at b/s.  Pt positioned upright in bed with Nsg.  Pt denies pain; mild hypophonia and son reports recent changes in voice.  Oral care with green toothette provided prior to PO trials; Pt cooperative.

## 2022-02-13 NOTE — SWALLOW BEDSIDE ASSESSMENT ADULT - ASR SWALLOW ASPIRATION MONITOR
*If evident, report to MD immediately with palliative care f/u/change of breathing pattern/cough/gurgly voice/fever/pneumonia/throat clearing/upper respiratory infection

## 2022-02-13 NOTE — PROGRESS NOTE ADULT - SUBJECTIVE AND OBJECTIVE BOX
INTERNAL MEDICINE PROGRESS NOTE    Dr. Rupert Koenig, DO  Hospitalist  Division of Hospital Medicine  [facility]  Available via [employer] Teams      SUBJECTIVE:  Dyspnea improved from prior.    REVIEW OF SYSTEMS   5 point review of systems negative except for above.     PAST MEDICAL & SURGICAL HISTORY:  HTN (Hypertension)    Hypercholesterolemia    Paroxysmal Atrial Fibrillation    Cerebrovascular accident (CVA)  MCA infarct sp thrombectomy    History of Abdominal Hysterectomy    Hip Fracture  s/p hip pinning    S/P appendectomy  2010        MEDICATIONS  (STANDING):  apixaban 2.5 milliGRAM(s) Oral every 12 hours  atorvastatin 40 milliGRAM(s) Oral <User Schedule>  citalopram 10 milliGRAM(s) Oral <User Schedule>  furosemide   Injectable 20 milliGRAM(s) IV Push daily  metoprolol succinate  milliGRAM(s) Oral <User Schedule>  pantoprazole    Tablet 40 milliGRAM(s) Oral before breakfast  petrolatum white Ointment 1 Application(s) Topical daily  polyethylene glycol 3350 17 Gram(s) Oral daily    MEDICATIONS  (PRN):  acetaminophen     Tablet .. 650 milliGRAM(s) Oral every 6 hours PRN Temp greater or equal to 38C (100.4F), Mild Pain (1 - 3)  aluminum hydroxide/magnesium hydroxide/simethicone Suspension 30 milliLiter(s) Oral every 4 hours PRN Dyspepsia  melatonin 3 milliGRAM(s) Oral at bedtime PRN Insomnia  ondansetron Injectable 4 milliGRAM(s) IV Push every 8 hours PRN Nausea and/or Vomiting      Allergies    No Known Allergies    Intolerances        T(C): 37 (02-13-22 @ 04:23), Max: 37 (02-13-22 @ 04:23)  T(F): 98.6 (02-13-22 @ 04:23), Max: 98.6 (02-13-22 @ 04:23)  HR: 81 (02-13-22 @ 04:23) (76 - 89)  BP: 157/72 (02-13-22 @ 04:23) (128/73 - 157/72)  ABP: --  ABP(mean): --  RR: 18 (02-13-22 @ 04:23) (17 - 18)  SpO2: 94% (02-13-22 @ 04:23) (94% - 100%)      CONSTITUTIONAL: No acute distress.   Cardiovascular: RRR with no murmurs. Trace lower extremity edema B/L. Extremities are warm and well perfused.    Respiratory: Dec bs at bases. No wrr. No accessory muscle use.   Gastrointestinal:  Soft, nontender. Non-distended. Non-rigid.   Neurologic:  Alert and awake. Moving all extremities. Following commands. Making eye contact.    Skin:  No rashes noted. No skin erythema noted.   Psych:  Normal affect. Normal Mood.     LABS    02-13    147<H>  |  107  |  50<H>  ----------------------------<  80  4.3   |  25  |  1.93<H>    Ca    10.3      13 Feb 2022 06:47  Phos  3.4     02-12  Mg     1.6     02-13            ALL RECENT STUDIES REVIEWED INCLUDING REPORTS AVAILABLE     CARE DISCUSSED WITH ALL CONSULTANTS

## 2022-02-13 NOTE — PROGRESS NOTE ADULT - PROBLEM SELECTOR PLAN 2
-pt family seeking comfort measures only including minimizing FS or unnecessary tests  -palliative consult

## 2022-02-14 LAB
ANION GAP SERPL CALC-SCNC: 14 MMOL/L — SIGNIFICANT CHANGE UP (ref 5–17)
BASOPHILS # BLD AUTO: 0.03 K/UL — SIGNIFICANT CHANGE UP (ref 0–0.2)
BASOPHILS NFR BLD AUTO: 0.5 % — SIGNIFICANT CHANGE UP (ref 0–2)
BUN SERPL-MCNC: 53 MG/DL — HIGH (ref 7–23)
CALCIUM SERPL-MCNC: 10.1 MG/DL — SIGNIFICANT CHANGE UP (ref 8.4–10.5)
CHLORIDE SERPL-SCNC: 104 MMOL/L — SIGNIFICANT CHANGE UP (ref 96–108)
CO2 SERPL-SCNC: 28 MMOL/L — SIGNIFICANT CHANGE UP (ref 22–31)
CREAT SERPL-MCNC: 1.92 MG/DL — HIGH (ref 0.5–1.3)
EOSINOPHIL # BLD AUTO: 0.11 K/UL — SIGNIFICANT CHANGE UP (ref 0–0.5)
EOSINOPHIL NFR BLD AUTO: 1.8 % — SIGNIFICANT CHANGE UP (ref 0–6)
GLUCOSE SERPL-MCNC: 91 MG/DL — SIGNIFICANT CHANGE UP (ref 70–99)
HCT VFR BLD CALC: 32.9 % — LOW (ref 34.5–45)
HGB BLD-MCNC: 10.4 G/DL — LOW (ref 11.5–15.5)
IMM GRANULOCYTES NFR BLD AUTO: 0.3 % — SIGNIFICANT CHANGE UP (ref 0–1.5)
LYMPHOCYTES # BLD AUTO: 1.3 K/UL — SIGNIFICANT CHANGE UP (ref 1–3.3)
LYMPHOCYTES # BLD AUTO: 20.8 % — SIGNIFICANT CHANGE UP (ref 13–44)
MCHC RBC-ENTMCNC: 30.8 PG — SIGNIFICANT CHANGE UP (ref 27–34)
MCHC RBC-ENTMCNC: 31.6 GM/DL — LOW (ref 32–36)
MCV RBC AUTO: 97.3 FL — SIGNIFICANT CHANGE UP (ref 80–100)
MONOCYTES # BLD AUTO: 0.77 K/UL — SIGNIFICANT CHANGE UP (ref 0–0.9)
MONOCYTES NFR BLD AUTO: 12.3 % — SIGNIFICANT CHANGE UP (ref 2–14)
NEUTROPHILS # BLD AUTO: 4.02 K/UL — SIGNIFICANT CHANGE UP (ref 1.8–7.4)
NEUTROPHILS NFR BLD AUTO: 64.3 % — SIGNIFICANT CHANGE UP (ref 43–77)
NRBC # BLD: 0 /100 WBCS — SIGNIFICANT CHANGE UP (ref 0–0)
PLATELET # BLD AUTO: 175 K/UL — SIGNIFICANT CHANGE UP (ref 150–400)
POTASSIUM SERPL-MCNC: 4 MMOL/L — SIGNIFICANT CHANGE UP (ref 3.5–5.3)
POTASSIUM SERPL-SCNC: 4 MMOL/L — SIGNIFICANT CHANGE UP (ref 3.5–5.3)
RBC # BLD: 3.38 M/UL — LOW (ref 3.8–5.2)
RBC # FLD: 17 % — HIGH (ref 10.3–14.5)
SODIUM SERPL-SCNC: 146 MMOL/L — HIGH (ref 135–145)
WBC # BLD: 6.25 K/UL — SIGNIFICANT CHANGE UP (ref 3.8–10.5)
WBC # FLD AUTO: 6.25 K/UL — SIGNIFICANT CHANGE UP (ref 3.8–10.5)

## 2022-02-14 PROCEDURE — 99233 SBSQ HOSP IP/OBS HIGH 50: CPT

## 2022-02-14 RX ADMIN — Medication 1 APPLICATION(S): at 14:10

## 2022-02-14 RX ADMIN — POLYETHYLENE GLYCOL 3350 17 GRAM(S): 17 POWDER, FOR SOLUTION ORAL at 13:04

## 2022-02-14 RX ADMIN — Medication 100 MILLIGRAM(S): at 23:13

## 2022-02-14 RX ADMIN — PANTOPRAZOLE SODIUM 40 MILLIGRAM(S): 20 TABLET, DELAYED RELEASE ORAL at 10:29

## 2022-02-14 RX ADMIN — Medication 100 MILLIGRAM(S): at 11:40

## 2022-02-14 RX ADMIN — CITALOPRAM 10 MILLIGRAM(S): 10 TABLET, FILM COATED ORAL at 23:14

## 2022-02-14 RX ADMIN — APIXABAN 2.5 MILLIGRAM(S): 2.5 TABLET, FILM COATED ORAL at 23:13

## 2022-02-14 RX ADMIN — ATORVASTATIN CALCIUM 40 MILLIGRAM(S): 80 TABLET, FILM COATED ORAL at 23:13

## 2022-02-14 RX ADMIN — APIXABAN 2.5 MILLIGRAM(S): 2.5 TABLET, FILM COATED ORAL at 13:04

## 2022-02-14 NOTE — DIETITIAN INITIAL EVALUATION ADULT. - OTHER INFO
Pt noted to be confused at times - ? accuracy of limited information obtained. Grandson at bedside provided limited further subjective information. Pt reports poor appetite and PO intake. Drinking thickened juice at time of visit. Observed lunch tray, pt consumed <50%. Fecal incontinence noted. Last documented BM yesterday. S/p Swallow Bedside Assessment yesterday (2/13), recommended "Soft and bite sized with nectar thickened liquids."     Pt reports last weight at doctor's office 122 pounds - unable to recall when this was. Weight history per [facility]: 127.5 pounds (5/18/2021), 122.1 pounds (9/15/2021), 116 pounds (1/5/2022, 1/27/2022). Dosing weight this admission 110 pounds suggests ~5.2% wt loss x <1 month (clinically significant). Of note, pt on diuretics PTA and in-house, likely influencing weight status, however suspect clinically significant weight loss also in setting of inadequate protein-energy intake. Will continue to monitor and trend weights as able/appropriate.     Encouraged PO intake as tolerated. Pt with no food preferences at this time. Amenable to trial of Ensure Enlive (thickened, vanilla) to optimize intake. Pt and grandson made aware RD remains available for any food preferences and/or nutrition-related questions or concerns.

## 2022-02-14 NOTE — DIETITIAN NUTRITION RISK NOTIFICATION - TREATMENT: THE FOLLOWING DIET HAS BEEN RECOMMENDED
Diet, Soft and Bite Sized:   Mildly Thick Liquids (MILDTHICKLIQS)  Supplement Feeding Modality:  Oral  Ensure Enlive Cans or Servings Per Day:  2       Frequency:  Daily (02-14-22 @ 15:09) [Pending Verification By Attending]

## 2022-02-14 NOTE — DIETITIAN INITIAL EVALUATION ADULT. - PROBLEM SELECTOR PLAN 7
-Cr stable compared to baseline  -avoid nephrotoxic agents  - renally dose all meds  -encourage PO hydration

## 2022-02-14 NOTE — PROGRESS NOTE ADULT - SUBJECTIVE AND OBJECTIVE BOX
[facility]   [phone]  RENAL FOLLOW UP NOTE  --------------------------------------------------------------------------------  Chief Complaint:    24 hour events/subjective:    seen earlier grandson at bedside  pt resting/sleeping as per family had been alert and talking is just sleeping     PAST HISTORY  --------------------------------------------------------------------------------  No significant changes to PMH, PSH, FHx, SHx, unless otherwise noted    ALLERGIES & MEDICATIONS  --------------------------------------------------------------------------------  Allergies    No Known Allergies    Intolerances      Standing Inpatient Medications  apixaban 2.5 milliGRAM(s) Oral every 12 hours  atorvastatin 40 milliGRAM(s) Oral <User Schedule>  citalopram 10 milliGRAM(s) Oral <User Schedule>  metoprolol succinate  milliGRAM(s) Oral <User Schedule>  pantoprazole    Tablet 40 milliGRAM(s) Oral before breakfast  petrolatum white Ointment 1 Application(s) Topical daily  polyethylene glycol 3350 17 Gram(s) Oral daily    PRN Inpatient Medications  acetaminophen     Tablet .. 650 milliGRAM(s) Oral every 6 hours PRN  aluminum hydroxide/magnesium hydroxide/simethicone Suspension 30 milliLiter(s) Oral every 4 hours PRN  melatonin 3 milliGRAM(s) Oral at bedtime PRN  ondansetron Injectable 4 milliGRAM(s) IV Push every 8 hours PRN      REVIEW OF SYSTEMS  --------------------------------------------------------------------------------      VITALS/PHYSICAL EXAM  --------------------------------------------------------------------------------  T(C): 36.8 (02-14-22 @ 20:18), Max: 36.8 (02-14-22 @ 20:18)  HR: 80 (02-14-22 @ 20:18) (75 - 84)  BP: 103/65 (02-14-22 @ 20:18) (103/60 - 147/73)  RR: 18 (02-14-22 @ 20:18) (18 - 18)  SpO2: 99% (02-14-22 @ 20:18) (98% - 99%)  Wt(kg): --        02-14-22 @ 07:01  -  02-14-22 @ 22:21  --------------------------------------------------------  IN: 240 mL / OUT: 0 mL / NET: 240 mL      Physical Exam:  	    Gen: thin elderly F  on O2   	no jvd   	Pulm: decrease bs  no rales or ronchi or wheezing  	CV: RRR, S1S2; no rub  	Abd: +BS, soft, nontender/nondistended  	: No suprapubic tenderness  	UE: Warm, no cyanosis  no clubbing,  no edema  	LE: Warm, no cyanosis  no clubbing, no edema      LABS/STUDIES  --------------------------------------------------------------------------------              10.4   6.25  >-----------<  175      [02-14-22 @ 07:11]              32.9     146  |  104  |  53  ----------------------------<  91      [02-14-22 @ 07:12]  4.0   |  28  |  1.92        Ca     10.1     [02-14-22 @ 07:12]      Mg     1.6     [02-13-22 @ 06:47]            Creatinine Trend:  SCr 1.92 [02-14 @ 07:12]  SCr 1.93 [02-13 @ 06:47]  SCr 1.71 [02-12 @ 07:14]  SCr 1.43 [02-10 @ 16:27]                  TSH 4.01      [02-12-22 @ 10:11]

## 2022-02-14 NOTE — PHYSICAL THERAPY INITIAL EVALUATION ADULT - PERTINENT HX OF CURRENT PROBLEM, REHAB EVAL
89F PMH Afib,s/p PPM, CKD, diastolic CHF, CVA s/p thrombectomy p/w progressive decline at home with c/o SOB, SHIN, generalized weakness and dec PO intake. Newly started 1-2L NC which has increased to 6L. +persistent hypoxia refractory to lasix; +orthopnea; Palliative consulted for symptom management; pending TTE;

## 2022-02-14 NOTE — DIETITIAN INITIAL EVALUATION ADULT. - SIGNS/SYMPTOMS
as evidenced by <50% EER x 5 days, poor PO intake PTA, >5% wt loss x <1 month as evidenced by BMI 18.9 kg/m2

## 2022-02-14 NOTE — PHYSICAL THERAPY INITIAL EVALUATION ADULT - DISCHARGE DISPOSITION, PT EVAL
Subacute Rehab recommended. If patient/family decline and wish to go home, would require home PT, commode, assist with all activities at all times/rehabilitation facility

## 2022-02-14 NOTE — DIETITIAN INITIAL EVALUATION ADULT. - ORAL INTAKE PTA/DIET HISTORY
Per chart, pt with decreased PO intake, declining x 3 weeks, "she also has had poor appetite and has been fed mostly soft items like yoghurt." Per patient and grandson, pt takes Boost supplements at home. Of note, pt taking furosemide PTA. Pt confirmed NKFA.

## 2022-02-14 NOTE — PROGRESS NOTE ADULT - PROBLEM SELECTOR PLAN 2
-pt family seeking comfort measures only including minimizing FS or unnecessary tests  -seen by Palliative consult ; reccs appreciated

## 2022-02-14 NOTE — DIETITIAN INITIAL EVALUATION ADULT. - ORAL NUTRITION SUPPLEMENTS
Recommend Ensure Enlive 2 daily (350 kcal, 20 g protein in each), thickened to appropriate consistency PRN, to optimize intake

## 2022-02-14 NOTE — DIETITIAN INITIAL EVALUATION ADULT. - REASON INDICATOR FOR ASSESSMENT
BMI<19  Source: EMR, patient, patient's grandson at bedside    Pt is a 89 yo female with PMH of afib s/p PPM, CKD, diastolic CHF, CVA s/p thrombectomy who presented with progresive decline at home. Admitted 2/10.

## 2022-02-14 NOTE — DIETITIAN INITIAL EVALUATION ADULT. - ETIOLOGY
related to prolonged inadequate protein-energy intake related to prolonged inadequate protein-energy intake in setting of progressive decline

## 2022-02-14 NOTE — DIETITIAN INITIAL EVALUATION ADULT. - ADD RECOMMEND
Consider multivitamin and thiamine supplements if medically feasible and appropriate in setting of refeeding syndrome risk. Monitor PO intake, weight, labs, skin, GI status, diet. Malnutrition sticker placed, RD to follow-up as per protocol. Nutrition care plan to remain consistent with GOC.

## 2022-02-14 NOTE — PROGRESS NOTE ADULT - SUBJECTIVE AND OBJECTIVE BOX
Patient is a 89y old  Female who presents with a chief complaint of progressive decline at home (14 Feb 2022 14:43)      SUBJECTIVE / OVERNIGHT EVENTS:  Pt seen and examined with family at bedside. No acute events overnight. She denies CP, SOB, fever/chills.    MEDICATIONS  (STANDING):  apixaban 2.5 milliGRAM(s) Oral every 12 hours  atorvastatin 40 milliGRAM(s) Oral <User Schedule>  citalopram 10 milliGRAM(s) Oral <User Schedule>  metoprolol succinate  milliGRAM(s) Oral <User Schedule>  pantoprazole    Tablet 40 milliGRAM(s) Oral before breakfast  petrolatum white Ointment 1 Application(s) Topical daily  polyethylene glycol 3350 17 Gram(s) Oral daily    MEDICATIONS  (PRN):  acetaminophen     Tablet .. 650 milliGRAM(s) Oral every 6 hours PRN Temp greater or equal to 38C (100.4F), Mild Pain (1 - 3)  aluminum hydroxide/magnesium hydroxide/simethicone Suspension 30 milliLiter(s) Oral every 4 hours PRN Dyspepsia  melatonin 3 milliGRAM(s) Oral at bedtime PRN Insomnia  ondansetron Injectable 4 milliGRAM(s) IV Push every 8 hours PRN Nausea and/or Vomiting      Vital Signs Last 24 Hrs  T(C): 36.7 (14 Feb 2022 11:46), Max: 36.7 (13 Feb 2022 19:49)  T(F): 98.1 (14 Feb 2022 11:46), Max: 98.1 (13 Feb 2022 19:49)  HR: 80 (14 Feb 2022 12:44) (64 - 84)  BP: 103/62 (14 Feb 2022 12:44) (102/53 - 147/73)  BP(mean): --  RR: 18 (14 Feb 2022 11:46) (18 - 18)  SpO2: 99% (14 Feb 2022 12:44) (98% - 99%)  CAPILLARY BLOOD GLUCOSE        I&O's Summary      PHYSICAL EXAM:  GENERAL: No acute distress.   Cardiovascular: RRR with no murmurs. Trace lower extremity edema B/L. Extremities are warm and well perfused.    Respiratory: Dec bs at bases. No wrr. No accessory muscle use.   Gastrointestinal:  Soft, nontender. Non-distended. Non-rigid.   Neurologic:  Alert and awake. Moving all extremities. Following commands. Making eye contact.    Skin:  No rashes noted. No skin erythema noted.   Psych:  Normal affect. Normal Mood.         LABS:                        10.4   6.25  )-----------( 175      ( 14 Feb 2022 07:11 )             32.9     02-14    146<H>  |  104  |  53<H>  ----------------------------<  91  4.0   |  28  |  1.92<H>    Ca    10.1      14 Feb 2022 07:12  Mg     1.6     02-13                      Care Discussed with Consultants/Other Providers: Palliative Care

## 2022-02-14 NOTE — DIETITIAN INITIAL EVALUATION ADULT. - DIET TYPE
Remove DASH restrictions in setting of poor PO intake. Adjust diet PRN./soft and bite-sized/mildly thick liquids

## 2022-02-14 NOTE — PROGRESS NOTE ADULT - ASSESSMENT
89F PMH Afib on Eliquis,s/p PPM, renovascular htn with atrophic kidney, CKD, diastolic CHF, CVA s/p thrombectomy p/w progressive decline at home. Also with c/o SOB, SHIN, generalized weakness and dec PO intake.     1- CKD III   2- renovascular HTN   3- atrophic kidney   4- shortness of breath     creatinine is  higher trend cr   fluid status is better   hypernatremia trend na for now   trend bp   O2

## 2022-02-14 NOTE — PROGRESS NOTE ADULT - PROBLEM SELECTOR PLAN 1
acute on chronic diastolic HF  low flow AS noted  Lactate wnl; extremities warm.   Low suspicion for vte as patient has been on DOAC.  Currently sating 99% on 3L NC  S/p lasix 20 mg ivp qd. Pt hyperNa with worsening Cr.   per Nephrology can hold diuretics ; only give prn  c/t hold losartan for MARCO  family focus is on comfort care  will send global referral for home hospice

## 2022-02-15 LAB
ANION GAP SERPL CALC-SCNC: 12 MMOL/L — SIGNIFICANT CHANGE UP (ref 5–17)
BASOPHILS # BLD AUTO: 0.04 K/UL — SIGNIFICANT CHANGE UP (ref 0–0.2)
BASOPHILS NFR BLD AUTO: 0.7 % — SIGNIFICANT CHANGE UP (ref 0–2)
BUN SERPL-MCNC: 57 MG/DL — HIGH (ref 7–23)
CALCIUM SERPL-MCNC: 9.9 MG/DL — SIGNIFICANT CHANGE UP (ref 8.4–10.5)
CHLORIDE SERPL-SCNC: 104 MMOL/L — SIGNIFICANT CHANGE UP (ref 96–108)
CO2 SERPL-SCNC: 28 MMOL/L — SIGNIFICANT CHANGE UP (ref 22–31)
CREAT SERPL-MCNC: 1.92 MG/DL — HIGH (ref 0.5–1.3)
EOSINOPHIL # BLD AUTO: 0.1 K/UL — SIGNIFICANT CHANGE UP (ref 0–0.5)
EOSINOPHIL NFR BLD AUTO: 1.8 % — SIGNIFICANT CHANGE UP (ref 0–6)
GLUCOSE SERPL-MCNC: 91 MG/DL — SIGNIFICANT CHANGE UP (ref 70–99)
HCT VFR BLD CALC: 31.9 % — LOW (ref 34.5–45)
HGB BLD-MCNC: 10 G/DL — LOW (ref 11.5–15.5)
IMM GRANULOCYTES NFR BLD AUTO: 0.5 % — SIGNIFICANT CHANGE UP (ref 0–1.5)
LYMPHOCYTES # BLD AUTO: 1.18 K/UL — SIGNIFICANT CHANGE UP (ref 1–3.3)
LYMPHOCYTES # BLD AUTO: 21.2 % — SIGNIFICANT CHANGE UP (ref 13–44)
MCHC RBC-ENTMCNC: 31.2 PG — SIGNIFICANT CHANGE UP (ref 27–34)
MCHC RBC-ENTMCNC: 31.3 GM/DL — LOW (ref 32–36)
MCV RBC AUTO: 99.4 FL — SIGNIFICANT CHANGE UP (ref 80–100)
MONOCYTES # BLD AUTO: 0.67 K/UL — SIGNIFICANT CHANGE UP (ref 0–0.9)
MONOCYTES NFR BLD AUTO: 12.1 % — SIGNIFICANT CHANGE UP (ref 2–14)
NEUTROPHILS # BLD AUTO: 3.54 K/UL — SIGNIFICANT CHANGE UP (ref 1.8–7.4)
NEUTROPHILS NFR BLD AUTO: 63.7 % — SIGNIFICANT CHANGE UP (ref 43–77)
NRBC # BLD: 0 /100 WBCS — SIGNIFICANT CHANGE UP (ref 0–0)
PLATELET # BLD AUTO: 172 K/UL — SIGNIFICANT CHANGE UP (ref 150–400)
POTASSIUM SERPL-MCNC: 4 MMOL/L — SIGNIFICANT CHANGE UP (ref 3.5–5.3)
POTASSIUM SERPL-SCNC: 4 MMOL/L — SIGNIFICANT CHANGE UP (ref 3.5–5.3)
RBC # BLD: 3.21 M/UL — LOW (ref 3.8–5.2)
RBC # FLD: 16.8 % — HIGH (ref 10.3–14.5)
SODIUM SERPL-SCNC: 144 MMOL/L — SIGNIFICANT CHANGE UP (ref 135–145)
WBC # BLD: 5.56 K/UL — SIGNIFICANT CHANGE UP (ref 3.8–10.5)
WBC # FLD AUTO: 5.56 K/UL — SIGNIFICANT CHANGE UP (ref 3.8–10.5)

## 2022-02-15 PROCEDURE — 99232 SBSQ HOSP IP/OBS MODERATE 35: CPT

## 2022-02-15 PROCEDURE — 99233 SBSQ HOSP IP/OBS HIGH 50: CPT

## 2022-02-15 RX ORDER — SENNA PLUS 8.6 MG/1
2 TABLET ORAL AT BEDTIME
Refills: 0 | Status: DISCONTINUED | OUTPATIENT
Start: 2022-02-15 | End: 2022-02-19

## 2022-02-15 RX ADMIN — POLYETHYLENE GLYCOL 3350 17 GRAM(S): 17 POWDER, FOR SOLUTION ORAL at 11:13

## 2022-02-15 RX ADMIN — ATORVASTATIN CALCIUM 40 MILLIGRAM(S): 80 TABLET, FILM COATED ORAL at 21:37

## 2022-02-15 RX ADMIN — Medication 100 MILLIGRAM(S): at 13:50

## 2022-02-15 RX ADMIN — APIXABAN 2.5 MILLIGRAM(S): 2.5 TABLET, FILM COATED ORAL at 23:15

## 2022-02-15 RX ADMIN — PANTOPRAZOLE SODIUM 40 MILLIGRAM(S): 20 TABLET, DELAYED RELEASE ORAL at 11:15

## 2022-02-15 RX ADMIN — Medication 10 MILLIGRAM(S): at 13:50

## 2022-02-15 RX ADMIN — APIXABAN 2.5 MILLIGRAM(S): 2.5 TABLET, FILM COATED ORAL at 11:13

## 2022-02-15 RX ADMIN — SENNA PLUS 2 TABLET(S): 8.6 TABLET ORAL at 21:37

## 2022-02-15 RX ADMIN — Medication 1 APPLICATION(S): at 11:48

## 2022-02-15 RX ADMIN — Medication 100 MILLIGRAM(S): at 21:37

## 2022-02-15 RX ADMIN — CITALOPRAM 10 MILLIGRAM(S): 10 TABLET, FILM COATED ORAL at 22:08

## 2022-02-15 NOTE — PROGRESS NOTE ADULT - SUBJECTIVE AND OBJECTIVE BOX
HPI:  89F PMH Afib on Eliquis,s/p PPM, CKD, diastolic CHF, CVA s/p thrombectomy p/w progressive decline at home. Also with c/o SOB, SHIN, generalized weakness and dec PO intake. Family who have been taking care of pt at home have noticed a gradual decline over the past 3-weeks. Also report that the pt has been very dyspneic and anxious at night time. She recently started using 1-2L  NC  which has increased to 6L. Per son her O2 saturations have been as low as low-80s. She has taken lasix with  no relief. Family states she has to sleep propped up with several pillows.  She has also takes Ativan prn for anxiety. She has also had poor appetite and has been fed mostly soft items like yoghurt. No reports of dec UO or changes in BM. Of note, she had been evaluated by hospice for Hospice by [facility] ~ 1-week ago but was deemed ineligible at the time. Family seeking for comfort measures only and hospice placement           02-15-22 @ 15:23  [facility] [id number]    Overnight events: No major events  Staff reports: n/a  Patient: symptoms are well controlled  PRN use: n/a        RECENT VITALS/LABS/MEDICATIONS/ASSAYS     02-15-22 @ 15:23    T(C): 37.2 (02-15-22 @ 11:47), Max: 37.3 (02-15-22 @ 05:00)  HR: 78 (02-15-22 @ 11:47) (74 - 80)  BP: 101/54 (02-15-22 @ 11:47) (101/54 - 141/65)  RR: 18 (02-15-22 @ 11:47) (18 - 18)  SpO2: 99% (02-15-22 @ 11:47) (99% - 99%)  Wt(kg): --      14 Feb 2022 07:01  -  15 Feb 2022 07:00  --------------------------------------------------------  IN:    Oral Fluid: 240 mL  Total IN: 240 mL    OUT:    Voided (mL): 425 mL  Total OUT: 425 mL    Total NET: -185 mL      15 Feb 2022 07:01  -  15 Feb 2022 15:23  --------------------------------------------------------  IN:    Oral Fluid: 200 mL  Total IN: 200 mL    OUT:    Voided (mL): 200 mL  Total OUT: 200 mL    Total NET: 0 mL          02-14 @ 07:01  -  02-15 @ 07:00  --------------------------------------------------------  IN: 240 mL / OUT: 425 mL / NET: -185 mL    02-15 @ 07:01  -  02-15 @ 15:23  --------------------------------------------------------  IN: 200 mL / OUT: 200 mL / NET: 0 mL      CAPILLARY BLOOD GLUCOSE            acetaminophen     Tablet .. 650 milliGRAM(s) Oral every 6 hours PRN  aluminum hydroxide/magnesium hydroxide/simethicone Suspension 30 milliLiter(s) Oral every 4 hours PRN  apixaban 2.5 milliGRAM(s) Oral every 12 hours  atorvastatin 40 milliGRAM(s) Oral <User Schedule>  bisacodyl Suppository 10 milliGRAM(s) Rectal daily PRN  citalopram 10 milliGRAM(s) Oral <User Schedule>  melatonin 3 milliGRAM(s) Oral at bedtime PRN  metoprolol succinate  milliGRAM(s) Oral <User Schedule>  ondansetron Injectable 4 milliGRAM(s) IV Push every 8 hours PRN  pantoprazole    Tablet 40 milliGRAM(s) Oral before breakfast  petrolatum white Ointment 1 Application(s) Topical daily  polyethylene glycol 3350 17 Gram(s) Oral daily  senna 2 Tablet(s) Oral at bedtime          02-15    144  |  104  |  57<H>  ----------------------------<  91  4.0   |  28  |  1.92<H>    Ca    9.9      15 Feb 2022 07:09        Procalc  BNPSerum Pro-Brain Natriuretic Peptide: 7805 pg/mL (02-12-22 @ 07:14)    ABG                          10.0   5.56  )-----------( 172      ( 15 Feb 2022 07:09 )             31.9           blood and urine cultures                ED course: Afebrile. HDS. Saturating well on 3L NC     (10 Feb 2022 21:04)    PERTINENT PM/SXH:   HTN (Hypertension)    Hypercholesterolemia    Hypercholesterolemia    Hypercholesterolemia    Paroxysmal Atrial Fibrillation    Cerebrovascular accident (CVA)      History of Abdominal Hysterectomy    Hip Fracture    S/P appendectomy      FAMILY HISTORY:  No pertinent family history in first degree relatives      ITEMS NOT CHECKED ARE NOT PRESENT    SOCIAL HISTORY:   Significant other/partner[ ]  Children[ x]  Congregation/Spirituality:  Substance hx:  [ ]   Tobacco hx:  [ ]   Alcohol hx: [ ]   Home Opioid hx:  [ ] I-Stop Reference No:  Living Situation: [ ]Home  [ ]Long term care  [ ]Rehab [ ]Other    ADVANCE DIRECTIVES:    DNR  MOLST  [x ]  Living Will  [ ]   DECISION MAKER(s):  [ ] Health Care Proxy(s)  [ ] Surrogate(s)  [ ] Guardian           Name(s): Phone Number(s):    BASELINE (I)ADL(s) (prior to admission):  [place]: [ ]Total  [ ] Moderate [ ]Dependent    Allergies    No Known Allergies    Intolerances    MEDICATIONS  (STANDING):  apixaban 2.5 milliGRAM(s) Oral every 12 hours  atorvastatin 40 milliGRAM(s) Oral <User Schedule>  citalopram 10 milliGRAM(s) Oral <User Schedule>  losartan 100 milliGRAM(s) Oral <User Schedule>  metoprolol succinate  milliGRAM(s) Oral <User Schedule>  pantoprazole    Tablet 40 milliGRAM(s) Oral before breakfast    MEDICATIONS  (PRN):  acetaminophen     Tablet .. 650 milliGRAM(s) Oral every 6 hours PRN Temp greater or equal to 38C (100.4F), Mild Pain (1 - 3)  aluminum hydroxide/magnesium hydroxide/simethicone Suspension 30 milliLiter(s) Oral every 4 hours PRN Dyspepsia  melatonin 3 milliGRAM(s) Oral at bedtime PRN Insomnia  ondansetron Injectable 4 milliGRAM(s) IV Push every 8 hours PRN Nausea and/or Vomiting    PRESENT SYMPTOMS: [ ]Unable to obtain due to poor mentation   Source if other than patient:  [ ]Family   [ ]Team [ ] Inferred/Assessed    Pain: [ ]yes [x ]no  QOL impact -   Location -                    Aggravating factors -  Quality -  Radiation -  Timing-  Severity (0-10 scale):  Minimal acceptable level (0-10 scale):     PAIN AD Score:     [email or web address] (press ctrl +  left click to view)    [ ] Inferred Symptoms    Fatigue:                             [ ] None  [x ]Mild [ ]Moderate [ ]Severe  Drowsiness:                      [ x] None  [ ]Mild [ ]Moderate [ ]Severe  Nausea:                             [x ] None  [ ]Mild [ ]Moderate [ ]Severe  Dysgeusia:                         [x ] None  [ ]Mild [ ]Moderate [ ]Severe  Loss of appetite:              [ ] None  [x ]Mild [ ]Moderate [ ]Severe  Constipation:                    [ x] None  [ ]Mild [ ]Moderate [ ]Severe  Dyspnea:                           [ ] None  [ ]Mild [x ]Moderate [ ]Severe  Anxiety:                             [ ] None  [ ]Mild [ ]Moderate [ ]Severe  Depression:                      [ ] None  [ ]Mild [ ]Moderate [ ]Severe  Cognitive changes:          [ ] None  [ ]Mild [ ]Moderate [ ]Severe  Insomnia      :                    [ ] None  [ ]Mild [ ]Moderate [ ]Severe  Isolation:                           [ ] None  [ ]Mild [ ]Moderate [ ]Severe  Loneliness:                        [ ] None  [ ]Mild [ ]Moderate [ ]Severe  Lack of   Wellbeing:                        [ ] None  [ ]Mild [ ]Moderate [ ]Severe    Other Symptoms:  [x ]All other review of systems negative     Palliative Performance Status Version 2:   50    %    http://npcrc.org/files/news/palliative_performance_scale_ppsv2.pdf  PHYSICAL EXAM:       GENERAL:  [ x]Alert  [ ]Oriented x   [ ]Lethargic  [ ]Cachexia  [ ]Unarousable  [ ]Verbal  [ ]Non-Verbal [ ] Engaging   [  ] Confused  [  ] No distress  Behavioral:   [ ] Anxiety  [ ] Delirium [ ] Agitation [ x] Calm   [  ] Restless [ ] Other  HEENT:  [ x]Normal   [ ]Dry mouth   [ ]ET Tube/Trach  [ ]Oral lesions   [ ] NGT  [ ] Mucositis [ ] Oral  Bleeding  PULMONARY:   [x ]Clear [ ]Tachypnea  [ ]Audible excessive secretions [  ] No tachypnea  [ ]Rhonchi        [ ]Right [ ]Left [ ]Bilateral  [ ]Crackles        [ ]Right [ ]Left [ ]Bilateral  [ ]Wheezing     [ ]Right [ ]Left [ ]Bilateral  [ ]Diminished breath sounds [ ]right [ ]left [ ]bilateral  CARDIOVASCULAR:    [x]Regular [ ]Irregular [ ]Tachy  [ ]Chao [ ]Murmur [ ] JVD  GASTROINTESTINAL:  [x ]Soft  [ ]Distended   [ ]+BS  [ ]Non tender [ ]Tender  [ ]PEG [ ]OGT/ NGT  Last BM:   GENITOURINARY:  [x ]Normal [ ] Incontinent   [ ]Oliguria/Anuria   [ ]Lopez  MUSCULOSKELETAL:   [ ]Normal   [ x]Weakness  [ ]Bed/Wheelchair bound [ ]Edema  NEUROLOGIC:   [ ]No focal deficits  [ ]Cognitive impairment  [ ]Dysphagia [ ]Dysarthria [ ]Paresis [x]Other [place]  SKIN:   [x ]Normal    [ ]Rash  [ ]Pressure ulcer(s)   [  ] Lesion   [    ]  Wounds       Present on admission [ ]y [ ]n    CRITICAL CARE:  [ ] Shock Present  [ ]Septic [ ]Cardiogenic [ ]Neurologic [ ]Hypovolemic  [ ]  Vasopressors [ ]  Inotropes   [ ]Respiratory failure present [ ]Mechanical ventilation [ ]Non-invasive ventilatory support [ ]High flow  [ ]Acute  [ ]Chronic [ ]Hypoxic  [ ]Hypercarbic [ ]Other  [ ]Other organ failure     LABS:                        10.7   5.74  )-----------( 176      ( 10 Feb 2022 16:27 )             33.8   02-10    143  |  108  |  50<H>  ----------------------------<  94  4.9   |  24  |  1.43<H>    Ca    10.7<H>      10 Feb 2022 16:27    TPro  6.2  /  Alb  3.4  /  TBili  0.5  /  DBili  x   /  AST  24  /  ALT  45  /  AlkPhos  165<H>  02-10        RADIOLOGY & ADDITIONAL STUDIES:    PROTEIN CALORIE MALNUTRITION PRESENT: [ ]mild [ ]moderate [ ]severe [ ]underweight [ ]morbid obesity  https://www.andeal.org/vault/2440/web/files/ONC/Table_Clinical%20Characteristics%20to%20Document%20Malnutrition-White%20JV%20et%20al%202012.pdf    Height (cm): 162.6 (02-10-22 @ 15:21)  Weight (kg): 49.9 (02-10-22 @ 15:21)  BMI (kg/m2): 18.9 (02-10-22 @ 15:21)    [ ]PPSV2 < or = to 30% [ ]significant weight loss  [ ]poor nutritional intake  [ ]anasarca      [ ]Artificial Nutrition      REFERRALS:   [ ]Chaplaincy  [ ]Hospice  [ ]Child Life  [ ]Social Work  [ ]Case management [ ]Holistic Therapy     Goals of Care Document:

## 2022-02-15 NOTE — PROGRESS NOTE ADULT - PROBLEM SELECTOR PLAN 1
acute on chronic diastolic HF  low flow AS noted  Lactate wnl; extremities warm.   Low suspicion for vte as patient has been on DOAC.  Currently sating 99% on 3L NC  S/p lasix 20 mg ivp qd. Pt hyperNa with worsening Cr.   per Nephrology can hold diuretics ; only give prn  c/t hold losartan for MARCO  family focus is on comfort care  global referral sent for home hospice

## 2022-02-15 NOTE — PROGRESS NOTE ADULT - ASSESSMENT
89F PMH Afib on Eliquis,s/p PPM, renovascular htn with atrophic kidney, CKD, diastolic CHF, CVA s/p thrombectomy p/w progressive decline at home. Also with c/o SOB, SHIN, generalized weakness and dec PO intake.     1- CKD III   2- renovascular HTN   3- atrophic kidney   4- shortness of breath     creatinine is  higher but seem to be stabilizing   fluid status is better   hypernatremia is better  lasix prn only   cont toprol xl 200 mg daily   trend bp   O2

## 2022-02-15 NOTE — PROGRESS NOTE ADULT - SUBJECTIVE AND OBJECTIVE BOX
Patient is a 89y old  Female who presents with a chief complaint of Failure to thrive (15 Feb 2022 08:47)      SUBJECTIVE / OVERNIGHT EVENTS: Pt seen and examined. No acute events overnight. She denies CP, SOB.    MEDICATIONS  (STANDING):  apixaban 2.5 milliGRAM(s) Oral every 12 hours  atorvastatin 40 milliGRAM(s) Oral <User Schedule>  citalopram 10 milliGRAM(s) Oral <User Schedule>  metoprolol succinate  milliGRAM(s) Oral <User Schedule>  pantoprazole    Tablet 40 milliGRAM(s) Oral before breakfast  petrolatum white Ointment 1 Application(s) Topical daily  polyethylene glycol 3350 17 Gram(s) Oral daily    MEDICATIONS  (PRN):  acetaminophen     Tablet .. 650 milliGRAM(s) Oral every 6 hours PRN Temp greater or equal to 38C (100.4F), Mild Pain (1 - 3)  aluminum hydroxide/magnesium hydroxide/simethicone Suspension 30 milliLiter(s) Oral every 4 hours PRN Dyspepsia  melatonin 3 milliGRAM(s) Oral at bedtime PRN Insomnia  ondansetron Injectable 4 milliGRAM(s) IV Push every 8 hours PRN Nausea and/or Vomiting      Vital Signs Last 24 Hrs  T(C): 37.3 (15 Feb 2022 05:00), Max: 37.3 (15 Feb 2022 05:00)  T(F): 99.2 (15 Feb 2022 05:00), Max: 99.2 (15 Feb 2022 05:00)  HR: 74 (15 Feb 2022 05:00) (74 - 84)  BP: 130/62 (15 Feb 2022 05:00) (103/60 - 141/65)  BP(mean): --  RR: 18 (15 Feb 2022 05:00) (18 - 18)  SpO2: 99% (15 Feb 2022 05:00) (98% - 99%)  CAPILLARY BLOOD GLUCOSE        I&O's Summary    14 Feb 2022 07:01  -  15 Feb 2022 07:00  --------------------------------------------------------  IN: 240 mL / OUT: 425 mL / NET: -185 mL        PHYSICAL EXAM:  GENERAL: No acute distress.   Cardiovascular: RRR ; s1s2 with no murmurs. Trace lower extremity edema B/L.   Respiratory: Dec bs at bases. No wrr. No accessory muscle use.   Gastrointestinal:  Soft, nontender. Non-distended. Non-rigid.   Neurologic:  Alert and awake. Moving all extremities. Following commands. Making eye contact.    Skin:  No rashes noted. No skin erythema noted.  Extremeties ; Trace lower extremity edema B/L. Extremities are warm and well perfused.  Psych:  Normal affect. Normal Mood.     LABS:                        10.0   5.56  )-----------( 172      ( 15 Feb 2022 07:09 )             31.9     02-15    144  |  104  |  57<H>  ----------------------------<  91  4.0   |  28  |  1.92<H>    Ca    9.9      15 Feb 2022 07:09

## 2022-02-15 NOTE — PROGRESS NOTE ADULT - PROBLEM SELECTOR PLAN 6
Actions:  [x] Rapport building     [x] Symptom assessment    [] Eliciting preferences of goals   [] Prognostic understanding    [] Emotional Support  [] Coping skill development  []  Other  Interdisciplinary Referrals: SW/SM/Hospice  Communication: n/a  Documentation Review: [] Primary Team [] Consultants [] Interdisciplinary team

## 2022-02-15 NOTE — HOSPICE CARE NOTE - CONVESATION DETAILS
RN spoke with CM pt not cleared for discharge. [facility] team willing to accept but unable to do admin, Tele health available for Sat. RN spoke with CM Shama family verbalized interest in inpatient, family will be seeing Dr. Casas this afternoon. Per daughter patient lives alone and has no aide does not feeling comfortable to have pt d/c home(AE).

## 2022-02-15 NOTE — PROGRESS NOTE ADULT - SUBJECTIVE AND OBJECTIVE BOX
[facility]   [phone]  RENAL FOLLOW UP NOTE  --------------------------------------------------------------------------------  Chief Complaint:    24 hour events/subjective:    seen earlier.   family at bedside   pt sitting in chair   eating a little more today     PAST HISTORY  --------------------------------------------------------------------------------  No significant changes to PMH, PSH, FHx, SHx, unless otherwise noted    ALLERGIES & MEDICATIONS  --------------------------------------------------------------------------------  Allergies    No Known Allergies    Intolerances      Standing Inpatient Medications  apixaban 2.5 milliGRAM(s) Oral every 12 hours  atorvastatin 40 milliGRAM(s) Oral <User Schedule>  citalopram 10 milliGRAM(s) Oral <User Schedule>  metoprolol succinate  milliGRAM(s) Oral <User Schedule>  pantoprazole    Tablet 40 milliGRAM(s) Oral before breakfast  petrolatum white Ointment 1 Application(s) Topical daily  polyethylene glycol 3350 17 Gram(s) Oral daily  senna 2 Tablet(s) Oral at bedtime    PRN Inpatient Medications  acetaminophen     Tablet .. 650 milliGRAM(s) Oral every 6 hours PRN  aluminum hydroxide/magnesium hydroxide/simethicone Suspension 30 milliLiter(s) Oral every 4 hours PRN  bisacodyl Suppository 10 milliGRAM(s) Rectal daily PRN  melatonin 3 milliGRAM(s) Oral at bedtime PRN  ondansetron Injectable 4 milliGRAM(s) IV Push every 8 hours PRN      REVIEW OF SYSTEMS  --------------------------------------------------------------------------------    Gen: denies  fevers/chills,  CVS: denies chest pain/palpitations  Resp: denies SOB/Cough  GI: Denies N/V/Abd pain  : Denies dysuria      VITALS/PHYSICAL EXAM  --------------------------------------------------------------------------------  T(C): 37 (02-15-22 @ 21:20), Max: 37.3 (02-15-22 @ 05:00)  HR: 74 (02-15-22 @ 21:20) (74 - 78)  BP: 133/60 (02-15-22 @ 21:20) (101/54 - 141/65)  RR: 18 (02-15-22 @ 21:20) (18 - 18)  SpO2: 99% (02-15-22 @ 21:20) (99% - 99%)  Wt(kg): --        02-14-22 @ 07:01  -  02-15-22 @ 07:00  --------------------------------------------------------  IN: 240 mL / OUT: 425 mL / NET: -185 mL    02-15-22 @ 07:01  -  02-15-22 @ 22:16  --------------------------------------------------------  IN: 400 mL / OUT: 200 mL / NET: 200 mL      Physical Exam:  	    Gen: thin elderly F  on O2   	no jvd   	Pulm: decrease bs  no rales or ronchi or wheezing  	CV: RRR, S1S2; no rub  	Abd: +BS, soft, nontender/nondistended  	: No suprapubic tenderness  	UE: Warm, no cyanosis  no clubbing,  no edema  	LE: Warm, no cyanosis  no clubbing, no edema      LABS/STUDIES  --------------------------------------------------------------------------------              10.0   5.56  >-----------<  172      [02-15-22 @ 07:09]              31.9     144  |  104  |  57  ----------------------------<  91      [02-15-22 @ 07:09]  4.0   |  28  |  1.92        Ca     9.9     [02-15-22 @ 07:09]            Creatinine Trend:  SCr 1.92 [02-15 @ 07:09]  SCr 1.92 [02-14 @ 07:12]  SCr 1.93 [02-13 @ 06:47]  SCr 1.71 [02-12 @ 07:14]  SCr 1.43 [02-10 @ 16:27]                  TSH 4.01      [02-12-22 @ 10:11]

## 2022-02-16 LAB
ANION GAP SERPL CALC-SCNC: 10 MMOL/L — SIGNIFICANT CHANGE UP (ref 5–17)
BUN SERPL-MCNC: 62 MG/DL — HIGH (ref 7–23)
CALCIUM SERPL-MCNC: 9.8 MG/DL — SIGNIFICANT CHANGE UP (ref 8.4–10.5)
CHLORIDE SERPL-SCNC: 104 MMOL/L — SIGNIFICANT CHANGE UP (ref 96–108)
CO2 SERPL-SCNC: 28 MMOL/L — SIGNIFICANT CHANGE UP (ref 22–31)
CREAT SERPL-MCNC: 2.01 MG/DL — HIGH (ref 0.5–1.3)
GLUCOSE SERPL-MCNC: 88 MG/DL — SIGNIFICANT CHANGE UP (ref 70–99)
HCT VFR BLD CALC: 30.9 % — LOW (ref 34.5–45)
HGB BLD-MCNC: 9.8 G/DL — LOW (ref 11.5–15.5)
MCHC RBC-ENTMCNC: 31.7 GM/DL — LOW (ref 32–36)
MCHC RBC-ENTMCNC: 31.8 PG — SIGNIFICANT CHANGE UP (ref 27–34)
MCV RBC AUTO: 100.3 FL — HIGH (ref 80–100)
NRBC # BLD: 0 /100 WBCS — SIGNIFICANT CHANGE UP (ref 0–0)
PLATELET # BLD AUTO: 176 K/UL — SIGNIFICANT CHANGE UP (ref 150–400)
POTASSIUM SERPL-MCNC: 4.1 MMOL/L — SIGNIFICANT CHANGE UP (ref 3.5–5.3)
POTASSIUM SERPL-SCNC: 4.1 MMOL/L — SIGNIFICANT CHANGE UP (ref 3.5–5.3)
RBC # BLD: 3.08 M/UL — LOW (ref 3.8–5.2)
RBC # FLD: 16.6 % — HIGH (ref 10.3–14.5)
SARS-COV-2 RNA SPEC QL NAA+PROBE: SIGNIFICANT CHANGE UP
SODIUM SERPL-SCNC: 142 MMOL/L — SIGNIFICANT CHANGE UP (ref 135–145)
WBC # BLD: 4.89 K/UL — SIGNIFICANT CHANGE UP (ref 3.8–10.5)
WBC # FLD AUTO: 4.89 K/UL — SIGNIFICANT CHANGE UP (ref 3.8–10.5)

## 2022-02-16 PROCEDURE — 99233 SBSQ HOSP IP/OBS HIGH 50: CPT

## 2022-02-16 RX ADMIN — CITALOPRAM 10 MILLIGRAM(S): 10 TABLET, FILM COATED ORAL at 22:36

## 2022-02-16 RX ADMIN — ATORVASTATIN CALCIUM 40 MILLIGRAM(S): 80 TABLET, FILM COATED ORAL at 22:36

## 2022-02-16 RX ADMIN — SENNA PLUS 2 TABLET(S): 8.6 TABLET ORAL at 22:36

## 2022-02-16 RX ADMIN — Medication 1 APPLICATION(S): at 12:06

## 2022-02-16 RX ADMIN — PANTOPRAZOLE SODIUM 40 MILLIGRAM(S): 20 TABLET, DELAYED RELEASE ORAL at 09:51

## 2022-02-16 RX ADMIN — Medication 100 MILLIGRAM(S): at 09:52

## 2022-02-16 RX ADMIN — APIXABAN 2.5 MILLIGRAM(S): 2.5 TABLET, FILM COATED ORAL at 12:05

## 2022-02-16 RX ADMIN — Medication 100 MILLIGRAM(S): at 22:35

## 2022-02-16 NOTE — PROGRESS NOTE ADULT - PROBLEM SELECTOR PLAN 2
-pt family seeking comfort measures only including minimizing FS or unnecessary tests  -seen by Palliative consult ; reccs appreciated  - family requesting PCU ; awaiting bed availability  - Hospice eval noted

## 2022-02-16 NOTE — PROGRESS NOTE ADULT - ASSESSMENT
89F PMH Afib on Eliquis,s/p PPM, renovascular htn with atrophic kidney, CKD, diastolic CHF, CVA s/p thrombectomy p/w progressive decline at home. Also with c/o SOB, SHIN, generalized weakness and dec PO intake.     1- CKD III   2- renovascular HTN   3- atrophic kidney   4- shortness of breath     creatinine is  higher overall   fluid status is better   hypernatremia is better  lasix prn only   cont toprol xl 200 mg daily   trend bp   O2   plan per primary team

## 2022-02-16 NOTE — CHART NOTE - NSCHARTNOTEFT_GEN_A_CORE
Granddaughter requested a meeting to review the plan of care.  Explained role and discussed candidacy for PCU.  Reviewed global referral and plan for transfer.  Provided basics about hospice and care escalation in the home setting.  Family wants to speak with CM and Renal.    Time spent: 12 minutes

## 2022-02-16 NOTE — HOSPICE CARE NOTE - CONVESATION DETAILS
RN Spoke with PATO MARTINEZ who spoke with family and family is explained that so far patient is not meeting inpatient criteria, hospice may be able to provide an aide 20hrs/wk and list of agencies was given to family for supplemental hours as family is making decision on services being offered. patient is waiting to be moved to PCU patient not yet cleared for discharge, RN to follow(AE).

## 2022-02-16 NOTE — PROGRESS NOTE ADULT - PROBLEM SELECTOR PLAN 1
acute on chronic diastolic HF  low flow AS noted  Lactate wnl; extremities warm.   Low suspicion for vte as patient has been on DOAC.  Currently sating 99% on 3L NC  S/p lasix 20 mg ivp qd ; now with  worsening Cr.   c/t hold diuretics and losartan for MARCO  Nephrology following  family focus is on comfort care  seen by home hospice

## 2022-02-16 NOTE — PROGRESS NOTE ADULT - NSPROGADDITIONALINFOA_GEN_ALL_CORE
d/w ACP Simran    d/w family at bedside and with niece Jessica Dwayne [phone] ( point person for updates)
d/w ACP Luz Marina    d/w Jessica Rice [phone] at bedside ; requesting wound care consult
d/w ACP Simran Rice [phone] is family point person for updates.

## 2022-02-16 NOTE — PROGRESS NOTE ADULT - PROBLEM SELECTOR PLAN 8
-Diet: soft diet, thickened liquids  -DVT ppx: Eliquis BiD
-Diet: soft diet, thickened liquids  -DVT ppx: Eliquis BiD  -BM regimen.    DISPO:  pending PCU bed    All consultant contribution to care greatly appreciated.
-Diet: soft diet, thickened liquids  -DVT ppx: Eliquis BiD  -BM regimen.    DISPO:  PCU bed vs home hospice
-Diet: soft diet, thickened liquids  -DVT ppx: Eliquis BiD  -BM regimen.    DISPO:  pending PCU bed and hospice referral.
-Diet: soft diet, thickened liquids  -DVT ppx: Eliquis BiD  -BM regimen.    DISPO:  pending PCU bed and hospice referral.
-Diet: soft diet, thickened liquids  -DVT ppx: Eliquis BiD  -BM regimen.  -Family requesting PT evaluation and S/S evaluation.     DISPO:  pending PCU bed and hospice referral.     All consultant contribution to care greatly appreciated.

## 2022-02-16 NOTE — PROGRESS NOTE ADULT - SUBJECTIVE AND OBJECTIVE BOX
Patient is a 89y old  Female who presents with a chief complaint of Failure to thrive (16 Feb 2022 11:55)      SUBJECTIVE / OVERNIGHT EVENTS:  Pt seen and examined w/family at bedside. No acute events overnight. She denies cp, sob. Having regular bms.    MEDICATIONS  (STANDING):  apixaban 2.5 milliGRAM(s) Oral every 12 hours  atorvastatin 40 milliGRAM(s) Oral <User Schedule>  citalopram 10 milliGRAM(s) Oral <User Schedule>  metoprolol succinate  milliGRAM(s) Oral <User Schedule>  pantoprazole    Tablet 40 milliGRAM(s) Oral before breakfast  petrolatum white Ointment 1 Application(s) Topical daily  polyethylene glycol 3350 17 Gram(s) Oral daily  senna 2 Tablet(s) Oral at bedtime    MEDICATIONS  (PRN):  acetaminophen     Tablet .. 650 milliGRAM(s) Oral every 6 hours PRN Temp greater or equal to 38C (100.4F), Mild Pain (1 - 3)  aluminum hydroxide/magnesium hydroxide/simethicone Suspension 30 milliLiter(s) Oral every 4 hours PRN Dyspepsia  bisacodyl Suppository 10 milliGRAM(s) Rectal daily PRN Constipation  melatonin 3 milliGRAM(s) Oral at bedtime PRN Insomnia  ondansetron Injectable 4 milliGRAM(s) IV Push every 8 hours PRN Nausea and/or Vomiting      Vital Signs Last 24 Hrs  T(C): 36.8 (16 Feb 2022 11:41), Max: 37.4 (16 Feb 2022 05:08)  T(F): 98.3 (16 Feb 2022 11:41), Max: 99.4 (16 Feb 2022 05:08)  HR: 72 (16 Feb 2022 11:41) (72 - 78)  BP: 106/60 (16 Feb 2022 11:41) (106/60 - 133/60)  BP(mean): --  RR: 18 (16 Feb 2022 11:41) (18 - 18)  SpO2: 100% (16 Feb 2022 11:41) (98% - 100%)  CAPILLARY BLOOD GLUCOSE        I&O's Summary    15 Feb 2022 07:01  -  16 Feb 2022 07:00  --------------------------------------------------------  IN: 640 mL / OUT: 200 mL / NET: 440 mL    16 Feb 2022 07:01  -  16 Feb 2022 12:14  --------------------------------------------------------  IN: 120 mL / OUT: 175 mL / NET: -55 mL        PHYSICAL EXAM:  GENERAL: No acute distress; frail , up in chair   Cardiovascular: RRR ; s1s2 with no murmurs. Trace lower extremity edema B/L.   Respiratory: Dec bs at bases. No wrr. No accessory muscle use.   Gastrointestinal:  Soft, nontender. Non-distended. Non-rigid.   Neurologic:  Alert and awake ; oriented to self and place ;  Moving all extremities. Following commands. Making eye contact.    Extremeties ; Trace lower extremity edema B/L. Extremities are warm and well perfused.  Psych:  Normal affect. Normal Mood.     LABS:                        9.8    4.89  )-----------( 176      ( 16 Feb 2022 06:30 )             30.9     02-16    142  |  104  |  62<H>  ----------------------------<  88  4.1   |  28  |  2.01<H>    Ca    9.8      16 Feb 2022 06:30                  Care Discussed with Consultants/Other Providers: Palliative care

## 2022-02-17 PROCEDURE — 99222 1ST HOSP IP/OBS MODERATE 55: CPT

## 2022-02-17 PROCEDURE — 99233 SBSQ HOSP IP/OBS HIGH 50: CPT

## 2022-02-17 RX ORDER — NYSTATIN CREAM 100000 [USP'U]/G
1 CREAM TOPICAL
Refills: 0 | Status: DISCONTINUED | OUTPATIENT
Start: 2022-02-17 | End: 2022-02-24

## 2022-02-17 RX ORDER — ZINC OXIDE 200 MG/G
1 OINTMENT TOPICAL
Refills: 0 | Status: DISCONTINUED | OUTPATIENT
Start: 2022-02-17 | End: 2022-02-24

## 2022-02-17 RX ADMIN — NYSTATIN CREAM 1 APPLICATION(S): 100000 CREAM TOPICAL at 09:19

## 2022-02-17 RX ADMIN — APIXABAN 2.5 MILLIGRAM(S): 2.5 TABLET, FILM COATED ORAL at 22:56

## 2022-02-17 RX ADMIN — Medication 100 MILLIGRAM(S): at 09:17

## 2022-02-17 RX ADMIN — APIXABAN 2.5 MILLIGRAM(S): 2.5 TABLET, FILM COATED ORAL at 12:51

## 2022-02-17 RX ADMIN — APIXABAN 2.5 MILLIGRAM(S): 2.5 TABLET, FILM COATED ORAL at 00:18

## 2022-02-17 RX ADMIN — NYSTATIN CREAM 1 APPLICATION(S): 100000 CREAM TOPICAL at 18:02

## 2022-02-17 RX ADMIN — ZINC OXIDE 1 APPLICATION(S): 200 OINTMENT TOPICAL at 18:02

## 2022-02-17 RX ADMIN — Medication 1 APPLICATION(S): at 12:55

## 2022-02-17 RX ADMIN — ZINC OXIDE 1 APPLICATION(S): 200 OINTMENT TOPICAL at 09:17

## 2022-02-17 RX ADMIN — PANTOPRAZOLE SODIUM 40 MILLIGRAM(S): 20 TABLET, DELAYED RELEASE ORAL at 09:17

## 2022-02-17 NOTE — PROGRESS NOTE ADULT - PROBLEM SELECTOR PLAN 5
Actions:  [x] Rapport building     [x] Symptom assessment    [] Eliciting preferences of goals   [] Prognostic understanding    [] Emotional Support  [] Coping skill development  []  Other  Interdisciplinary Referrals: SW/SM/Hospice  Communication: n/a  Documentation Review: [] Primary Team [] Consultants [] Interdisciplinary team Actions:  [x] Rapport building     [x] Symptom assessment    [] Eliciting preferences of goals   [] Prognostic understanding    [] Emotional Support  [] Coping skill development  []  Other  Interdisciplinary Referrals: SW/SM/Hospice  Communication: n/a  Documentation Review: [] Primary Team [] Consultants [] Interdisciplinary team    Lengthy bedside discussion with patient and her daughter, as well as SW, regarding Hospice. Has been accepted at 3 programs.

## 2022-02-17 NOTE — HOSPICE CARE NOTE - CONVESATION DETAILS
RN called PRANAY Rivas who stated that patient is new to PCU floor, patient is stable. Aide list was given to dtr and patient was placed on a global hospice application dtr is deciding between which hospice service she would prefer.     RN called and spoke with patient's daughter Karina, she is currently looking at different hospices services, offers and locations. Dtr ask to revisit with her tomorrow regarding decision(AE).

## 2022-02-17 NOTE — PROGRESS NOTE ADULT - PROBLEM SELECTOR PLAN 1
Multifactorial- acute on chronic diastolic HF, AS    No complaints at this time, comfortable on 3L NC.

## 2022-02-17 NOTE — CONSULT NOTE ADULT - SUBJECTIVE AND OBJECTIVE BOX
Wound SURGERY CONSULT NOTE    HPI:  89F PMH Afib on Eliquis,s/p PPM, CKD, diastolic CHF, CVA s/p thrombectomy p/w progressive decline at home. Also with c/o SOB, SHIN, generalized weakness and dec PO intake. Family who have been taking care of pt at home have noticed a gradual decline over the past 3-weeks. Also report that the pt has been very dyspneic and anxious at night time. She recently started using 1-2L  NC  which has increased to 6L. Per son her O2 saturations have been as low as low-80s. She has taken lasix with  no relief. Family states she has to sleep propped up with several pillows.  She has also takes Ativan prn for anxiety. She has also had poor appetite and has been fed mostly soft items like yoghurt. No reports of dec UO or changes in BM. Of note, she had been evaluated by hospice for Hospice by [facility] ~ 1-week ago but was deemed ineligible at the time. Family seeking for comfort measures only and hospice placement    ED course: Afebrile. HDS. Saturating well on 3L NC     (10 Feb 2022 21:04)    Wound consult requested to assist w/ management of  MAD/IAD.  Pt without complaints.  Increasingly sedentary.  Incontinent of urine & stool.   Appetite decreased.   Daughter at bedside. All questions asked and answered to pt's and family's satisfaction.    Current Diet:     PAST MEDICAL & SURGICAL HISTORY:  HTN (Hypertension)    Hypercholesterolemia    Paroxysmal Atrial Fibrillation    Cerebrovascular accident (CVA)  MCA infarct sp thrombectomy    History of Abdominal Hysterectomy    Hip Fracture  s/p hip pinning    S/P appendectomy  2010        REVIEW OF SYSTEMS:   General/ Breast/ Skin/ Neuro/ MSK: see HPI  All other systems negative    MEDICATIONS  (STANDING):  apixaban 2.5 milliGRAM(s) Oral every 12 hours  atorvastatin 40 milliGRAM(s) Oral <User Schedule>  citalopram 10 milliGRAM(s) Oral <User Schedule>  metoprolol succinate  milliGRAM(s) Oral <User Schedule>  nystatin Ointment 1 Application(s) Topical two times a day  pantoprazole    Tablet 40 milliGRAM(s) Oral before breakfast  petrolatum white Ointment 1 Application(s) Topical daily  polyethylene glycol 3350 17 Gram(s) Oral daily  senna 2 Tablet(s) Oral at bedtime  zinc oxide 40% Ointment 1 Application(s) Topical two times a day    MEDICATIONS  (PRN):  acetaminophen     Tablet .. 650 milliGRAM(s) Oral every 6 hours PRN Temp greater or equal to 38C (100.4F), Mild Pain (1 - 3)  aluminum hydroxide/magnesium hydroxide/simethicone Suspension 30 milliLiter(s) Oral every 4 hours PRN Dyspepsia  bisacodyl Suppository 10 milliGRAM(s) Rectal daily PRN Constipation  melatonin 3 milliGRAM(s) Oral at bedtime PRN Insomnia  ondansetron Injectable 4 milliGRAM(s) IV Push every 8 hours PRN Nausea and/or Vomiting      Allergies    No Known Allergies    Intolerances        SOCIAL HISTORY:  pt lives at home    FAMILY HISTORY:  No pertinent family history in first degree relatives        PHYSICAL EXAM:  Vital Signs Last 24 Hrs  T(C): 37.4 (17 Feb 2022 08:51), Max: 37.4 (17 Feb 2022 08:51)  T(F): 99.4 (17 Feb 2022 08:51), Max: 99.4 (17 Feb 2022 08:51)  HR: 73 (17 Feb 2022 08:51) (73 - 78)  BP: 152/67 (17 Feb 2022 08:51) (115/85 - 152/67)  BP(mean): --  RR: 18 (17 Feb 2022 08:51) (18 - 18)  SpO2: 94% (17 Feb 2022 08:51) (94% - 99%)    NAD,  A&O    frail  well groomed  low air loss fluidized mattress     HEENT:  NC/AT, EOMI, mucosa moist, neck supple    Respiratory: equal chest rise    Gastrointestinal soft NT/ND     Neurology:  weakened strength & sensation grossly intact   no follows commands    Musculoskeletal:  FROM, no deformities/ contractures. Left knee surgical scar    Vascular: BLE equally warm,  no cyanosis, clubbing, edema  DP pulses palpable  no acute ischemia noted      Skin:  moist w/ good turgor  Perianal/buttock: mild erythema with tenderness to the touch  No odor, erythema, increased warmth, tenderness, induration, fluctuance    LABS/ CULTURES/ RADIOLOGY:                        9.8    4.89  )-----------( 176      ( 16 Feb 2022 06:30 )             30.9       142  |  104  |  62  ----------------------------<  88      [02-16-22 @ 06:30]  4.1   |  28  |  2.01        Ca     9.8     [02-16-22 @ 06:30]

## 2022-02-17 NOTE — PROGRESS NOTE ADULT - SUBJECTIVE AND OBJECTIVE BOX
GAP TEAM PALLIATIVE CARE UNIT PROGRESS NOTE:      [  ] Patient on hospice program.    INDICATION FOR PALLIATIVE CARE UNIT SERVICES:  Symptom management     INTERVAL HPI/OVERNIGHT EVENTS:  Patient seen and examined at bedside. No acute events overnight, resting comfortably. No pain or shortness of breath. No PRN medications required. Only complaint is not liking the hospital food.     DNR on chart:   Allergies    No Known Allergies    Intolerances    MEDICATIONS  (STANDING):  apixaban 2.5 milliGRAM(s) Oral every 12 hours  atorvastatin 40 milliGRAM(s) Oral <User Schedule>  citalopram 10 milliGRAM(s) Oral <User Schedule>  metoprolol succinate  milliGRAM(s) Oral <User Schedule>  nystatin Ointment 1 Application(s) Topical two times a day  pantoprazole    Tablet 40 milliGRAM(s) Oral before breakfast  petrolatum white Ointment 1 Application(s) Topical daily  polyethylene glycol 3350 17 Gram(s) Oral daily  senna 2 Tablet(s) Oral at bedtime  zinc oxide 40% Ointment 1 Application(s) Topical two times a day    MEDICATIONS  (PRN):  acetaminophen     Tablet .. 650 milliGRAM(s) Oral every 6 hours PRN Temp greater or equal to 38C (100.4F), Mild Pain (1 - 3)  aluminum hydroxide/magnesium hydroxide/simethicone Suspension 30 milliLiter(s) Oral every 4 hours PRN Dyspepsia  bisacodyl Suppository 10 milliGRAM(s) Rectal daily PRN Constipation  melatonin 3 milliGRAM(s) Oral at bedtime PRN Insomnia  ondansetron Injectable 4 milliGRAM(s) IV Push every 8 hours PRN Nausea and/or Vomiting    ITEMS UNCHECKED ARE NOT PRESENT    PRESENT SYMPTOMS: [ ]Unable to obtain due to poor mentation     Source if other than patient:  [ ]Family   [ ]Team     Pain: None  Location:       Minimal acceptable level (0-10 scale):  Aggravating factors:  Quality:  Radiation:  Severity (0-10 scale):     Dyspnea:                           [ ]Mild [ ]Moderate [ ]Severe  Anxiety:                             [ ]Mild [ ]Moderate [ ]Severe  Fatigue:                             [ ]Mild [ ]Moderate [ ]Severe  Nausea:                             [ ]Mild [ ]Moderate [ ]Severe  Loss of appetite:              [ ]Mild [ ]Moderate [ ]Severe  Constipation:                    [ ]Mild [ ]Moderate [ ]Severe    PAINAD Score: 0    [email or web address] (Ctrl +  left click to view)  		  Other Symptoms:  [X ]All other review of systems negative     Karnofsky Performance Score/Palliative Performance Status Version 2:         %        http://palliative.info/resource_material/[email or web address].pdf    PHYSICAL EXAM:    Vital Signs Last 24 Hrs  T(C): 37.4 (17 Feb 2022 08:51), Max: 37.4 (17 Feb 2022 08:51)  T(F): 99.4 (17 Feb 2022 08:51), Max: 99.4 (17 Feb 2022 08:51)  HR: 73 (17 Feb 2022 08:51) (73 - 78)  BP: 152/67 (17 Feb 2022 08:51) (115/85 - 152/67)  BP(mean): --  RR: 18 (17 Feb 2022 08:51) (18 - 18)  SpO2: 94% (17 Feb 2022 08:51) (94% - 99%) I&O's Summary    16 Feb 2022 07:01  -  17 Feb 2022 07:00  --------------------------------------------------------  IN: 840 mL / OUT: 775 mL / NET: 65 mL    GENERAL:  [X ]Alert  [X ]Oriented    [ ]Lethargic  [ ]Cachexia  [ ]Unarousable  [ ]Verbal  [ ]Non-Verbal    Behavioral:   [ ] Anxiety  [ ] Delirium [ ] Agitation [ ] Other    HEENT:  [X ]Normal   [ ]Dry mouth   [ ]ET Tube/Trach  [ ]Oral lesions    PULMONARY:   [X ]Clear [ ]Tachypnea  [ ]Audible excessive secretions   [ ]Rhonchi        [ ]Right [ ]Left [ ]Bilateral  [ ]Crackles        [ ]Right [ ]Left [ ]Bilateral  [ ]Wheezing     [ ]Right [ ]Left [ ]Bilateral    CARDIOVASCULAR:    [X ]Regular [ ]Irregular [ ]Tachy  [ ]Chao [ ]Murmur [ ]Other    GASTROINTESTINAL:  [X ]Soft  [ ]Distended   [ ]+BS  [ ]Non tender [ ]Tender  [ ]PEG [ ]OGT/ NGT   Last BM:   GENITOURINARY:  [ ]Normal [ ] Incontinent   [ ]Oliguria/Anuria   [ ]Lopez    MUSCULOSKELETAL:   [X ]Normal   [ ]Weakness  [ ]Bed/Wheelchair bound [ ]Edema    NEUROLOGIC:   [ X]No focal deficits  [ ] Cognitive impairment  [ ] Dysphagia [ ]Dysarthria [ ] Paresis [ ]Other     SKIN:   [X ]Normal   [ ]Pressure ulcer(s)  [ ]Rash    CRITICAL CARE:  [ ] Shock Present  [ ]Septic [ ]Cardiogenic [ ]Neurologic [ ]Hypovolemic  [ ]  Vasopressors [ ]  Inotropes   [ ] Respiratory failure present  [ ] Acute  [ ] Chronic [ ] Hypoxic  [ ] Hypercarbic [ ] Other  [ ] Other organ failure     LABS:                        9.8    4.89  )-----------( 176      ( 16 Feb 2022 06:30 )             30.9   02-16    142  |  104  |  62<H>  ----------------------------<  88  4.1   |  28  |  2.01<H>    Ca    9.8      16 Feb 2022 06:30          RADIOLOGY & ADDITIONAL STUDIES:    PROTEIN CALORIE MALNUTRITION: [ ] yes   [ ]no  [ ] PPSV2 < or = 30% [ ] significant weight loss [ ] poor nutritional intake [ ] anasarca [ ] catabolic state    Artificial Nutrition [ ]     REFERRALS:   [ ]Chaplaincy  [ X] Hospice  [ ]Child Life  [ ]Social Work  [ ]Case management [ ]Holistic Therapy [ ] Physical Therapy [ ] Dietary   Goals of Care Document:

## 2022-02-17 NOTE — CONSULT NOTE ADULT - ASSESSMENT
A/P: 89F PMH Afib on Eliquis,s/p PPM, CKD, diastolic CHF, CVA s/p thrombectomy p/w progressive decline at home. Also with c/o SOB, SHIN, generalized weakness and dec PO intake. Family who have been taking care of pt at home have noticed a gradual decline over the past 3-weeks. Also report that the pt has been very dyspneic and anxious at night time. She recently started using 1-2L  NC  which has increased to 6L. Per son her O2 saturations have been as low as low-80s. She has taken lasix with  no relief. Family states she has to sleep propped up with several pillows.  She has also takes Ativan prn for anxiety. She has also had poor appetite and has been fed mostly soft items like yoghurt. No reports of dec UO or changes in BM. Of note, she had been evaluated by hospice for Hospice by [facility] ~ 1-week ago but was deemed ineligible at the time. Family seeking for comfort measures only and hospice placement      Wound Consult requested to assist w/ management of MAD/IAD.     On exam:  IAD of perianal/buttock area      Recommendations:    perianal/buttocks:  ANTONETTE cream to area BID  BLE elevation  Moisturize intact skin w/ SWEEN cream BID  Nutrition Consult for optimization as tolerated in pt w/ Protein Calorie Malnutrition, Inadequate PO intake        consider MVI & Vit C to promote wound healing        encourage high quality protein, w/ supplements such as ensure/ glucerna  Anemia- transfusions, Fe studies  Continue turning and positioning w/ offloading assistive devices as per protocol  Continue w/ Pericare as per protocol  Waffle Cushion to chair when oob to chair  Continue w/ low air loss fluidized bed surface   Care as per medicine, will follow w/ you  Upon discharge f/[facility] as [facility] at [facility] [address] [place] [address] [phone]  D/W nursing and with daughter at bedside  Thank you for this consult    Alesha Francois MD  Wound Care/Surgery    I spent 50  minutes face to face w/ this pt of which more than 50% of the time was spent counseling & coordinating care of this pt.    
89F PMH Afib on Eliquis,s/p PPM, renovascular htn with atrophic kidney, CKD, diastolic CHF, CVA s/p thrombectomy p/w progressive decline at home. Also with c/o SOB, SHIN, generalized weakness and dec PO intake.     1- CKD III   2- renovascular HTN   3- atrophic kidney   4- shortness of breath     creatinine is steady   cont losartan 100 mg daily   holding off on hctz   O2   d/w pt daughter at bedside and Dr. Medina

## 2022-02-17 NOTE — CONSULT NOTE ADULT - CONSULT REASON
abn creatinine
MAD. IAD
consulted for assistance with complex medical decision making in the context of a patient with failure to thrive

## 2022-02-18 PROCEDURE — 99233 SBSQ HOSP IP/OBS HIGH 50: CPT

## 2022-02-18 RX ADMIN — NYSTATIN CREAM 1 APPLICATION(S): 100000 CREAM TOPICAL at 06:21

## 2022-02-18 RX ADMIN — Medication 100 MILLIGRAM(S): at 22:17

## 2022-02-18 RX ADMIN — Medication 100 MILLIGRAM(S): at 10:58

## 2022-02-18 RX ADMIN — NYSTATIN CREAM 1 APPLICATION(S): 100000 CREAM TOPICAL at 17:43

## 2022-02-18 RX ADMIN — PANTOPRAZOLE SODIUM 40 MILLIGRAM(S): 20 TABLET, DELAYED RELEASE ORAL at 10:58

## 2022-02-18 RX ADMIN — Medication 1 APPLICATION(S): at 12:57

## 2022-02-18 RX ADMIN — ZINC OXIDE 1 APPLICATION(S): 200 OINTMENT TOPICAL at 06:20

## 2022-02-18 RX ADMIN — ZINC OXIDE 1 APPLICATION(S): 200 OINTMENT TOPICAL at 17:43

## 2022-02-18 RX ADMIN — APIXABAN 2.5 MILLIGRAM(S): 2.5 TABLET, FILM COATED ORAL at 10:57

## 2022-02-18 RX ADMIN — ATORVASTATIN CALCIUM 40 MILLIGRAM(S): 80 TABLET, FILM COATED ORAL at 22:16

## 2022-02-18 RX ADMIN — CITALOPRAM 10 MILLIGRAM(S): 10 TABLET, FILM COATED ORAL at 22:16

## 2022-02-18 RX ADMIN — APIXABAN 2.5 MILLIGRAM(S): 2.5 TABLET, FILM COATED ORAL at 22:16

## 2022-02-18 RX ADMIN — POLYETHYLENE GLYCOL 3350 17 GRAM(S): 17 POWDER, FOR SOLUTION ORAL at 12:49

## 2022-02-18 NOTE — PROGRESS NOTE ADULT - PROBLEM SELECTOR PLAN 1
Multifactorial- acute on chronic diastolic HF, AS    No complaints at this time, comfortable on 3L NC. Multifactorial- acute on chronic diastolic HF, AS  Not hypoxic  No complaints at this time, comfortable on 3L NC.

## 2022-02-18 NOTE — PROGRESS NOTE ADULT - TIME BILLING
Total time spent including the following  [x]chart review and documentation  []review of medical literature related to pathogenesis, disease/illness progress, treatment and/or prognosis  [x]care coordination: d/w PCU fellow  [x]discussion with the primary team: d/w ACP   []discussion with floor staff:  []discussion with consultant(s):  [x]discussion with the patient, surrogate decision maker, or family:  Time spent: > 35 min
Total time spent including the following  [x]chart review and documentation  []review of medical literature related to pathogenesis, disease/illness progress, treatment and/or prognosis  [x]care coordination: Sw  []discussion with the primary team:  []discussion with floor staff:  []discussion with consultant(s):  [x]discussion with the patient, surrogate decision maker, or family: Son  Time spent: > 35 min
Total time spent including the following  [x]chart review and documentation  []review of medical literature related to pathogenesis, disease/illness progress, treatment and/or prognosis  [x]care coordination: Sw  []discussion with the primary team:  []discussion with floor staff:  []discussion with consultant(s):  [x]discussion with the patient, surrogate decision maker, or family: Karina  Time spent: > 40 min

## 2022-02-18 NOTE — HOSPICE CARE NOTE - CONVESATION DETAILS
Family deciding between, other agencies, private hire and nursing home facilities. family will have meeting this weekend to make final decision RN to reassess next week. ref is placed on hold.  RN called PRANAY Rivas to notify of status.(AE).

## 2022-02-18 NOTE — PROGRESS NOTE ADULT - SUBJECTIVE AND OBJECTIVE BOX
GAP TEAM PALLIATIVE CARE UNIT PROGRESS NOTE:      [  ] Patient on hospice program.    INDICATION FOR PALLIATIVE CARE UNIT SERVICES:  Symptom Management     INTERVAL HPI/OVERNIGHT EVENTS:  Patient seen and examined at bedside. No acute events overnight. Endorsing some nausea when she tried to eat breakfast. No pain, SOB.     DNR on chart:   Allergies    No Known Allergies    Intolerances    MEDICATIONS  (STANDING):  apixaban 2.5 milliGRAM(s) Oral every 12 hours  atorvastatin 40 milliGRAM(s) Oral <User Schedule>  citalopram 10 milliGRAM(s) Oral <User Schedule>  metoprolol succinate  milliGRAM(s) Oral <User Schedule>  nystatin Ointment 1 Application(s) Topical two times a day  pantoprazole    Tablet 40 milliGRAM(s) Oral before breakfast  petrolatum white Ointment 1 Application(s) Topical daily  polyethylene glycol 3350 17 Gram(s) Oral daily  senna 2 Tablet(s) Oral at bedtime  zinc oxide 40% Ointment 1 Application(s) Topical two times a day    MEDICATIONS  (PRN):  acetaminophen     Tablet .. 650 milliGRAM(s) Oral every 6 hours PRN Temp greater or equal to 38C (100.4F), Mild Pain (1 - 3)  aluminum hydroxide/magnesium hydroxide/simethicone Suspension 30 milliLiter(s) Oral every 4 hours PRN Dyspepsia  bisacodyl Suppository 10 milliGRAM(s) Rectal daily PRN Constipation  melatonin 3 milliGRAM(s) Oral at bedtime PRN Insomnia  ondansetron Injectable 4 milliGRAM(s) IV Push every 8 hours PRN Nausea and/or Vomiting    ITEMS UNCHECKED ARE NOT PRESENT    PRESENT SYMPTOMS: [ ]Unable to obtain due to poor mentation     Source if other than patient:  [X ]Family   [ ]Team     Pain (Impact on QOL):  None   Location:       Minimal acceptable level (0-10 scale):  Aggravating factors:  Quality:  Radiation:  Severity (0-10 scale):     Dyspnea:                           [ ]Mild [ ]Moderate [ ]Severe  Anxiety:                             [ ]Mild [ ]Moderate [ ]Severe  Fatigue:                             [ ]Mild [ ]Moderate [ ]Severe  Nausea:                             [ ]Mild [ ]Moderate [ ]Severe  Loss of appetite:              [ ]Mild [ ]Moderate [ ]Severe  Constipation:                    [ ]Mild [ ]Moderate [ ]Severe    PAINAD Score: 0    [email or web address] (Ctrl +  left click to view)  		  Other Symptoms:  [X ]All other review of systems negative     Karnofsky Performance Score/Palliative Performance Status Version 2:         %        http://palliative.info/resource_material/[email or web address].pdf    PHYSICAL EXAM:    Vital Signs Last 24 Hrs  T(C): 36.4 (18 Feb 2022 07:42), Max: 36.4 (18 Feb 2022 07:42)  T(F): 97.6 (18 Feb 2022 07:42), Max: 97.6 (18 Feb 2022 07:42)  HR: 83 (18 Feb 2022 07:42) (83 - 83)  BP: 153/66 (18 Feb 2022 07:42) (153/66 - 153/66)  BP(mean): --  RR: 18 (18 Feb 2022 07:42) (18 - 18)  SpO2: 95% (18 Feb 2022 07:42) (95% - 95%) I&O's Summary    17 Feb 2022 07:01  -  18 Feb 2022 07:00  --------------------------------------------------------  IN: 0 mL / OUT: 200 mL / NET: -200 mL    GENERAL:  [ X]Alert  [ X]Oriented x   [ ]Lethargic  [ ]Cachexia  [ ]Unarousable  [ ]Verbal  [ ]Non-Verbal    Behavioral:   [ ] Anxiety  [ ] Delirium [ ] Agitation [ ] Other    HEENT:  [X ]Normal   [ ]Dry mouth   [ ]ET Tube/Trach  [ ]Oral lesions    PULMONARY:   [ X]Clear [ ]Tachypnea  [ ]Audible excessive secretions   [ ]Rhonchi        [ ]Right [ ]Left [ ]Bilateral  [ ]Crackles        [ ]Right [ ]Left [ ]Bilateral  [ ]Wheezing     [ ]Right [ ]Left [ ]Bilateral    CARDIOVASCULAR:    [X ]Regular [ ]Irregular [ ]Tachy  [ ]Chao [ ]Murmur [ ]Other    GASTROINTESTINAL:  [X ]Soft  [ ]Distended   [ ]+BS  [ ]Non tender [ ]Tender  [ ]PEG [ ]OGT/ NGT   Last BM:   GENITOURINARY:  [X ]Normal [ ] Incontinent   [ ]Oliguria/Anuria   [ ]Lopez    MUSCULOSKELETAL:   [X ]Normal   [ ]Weakness  [ ]Bed/Wheelchair bound [ ]Edema    NEUROLOGIC:   [ X]No focal deficits  [ ] Cognitive impairment  [ ] Dysphagia [ ]Dysarthria [ ] Paresis [ ]Other     SKIN:   [X ]Normal   [ ]Pressure ulcer(s)  [ ]Rash    CRITICAL CARE:  [ ] Shock Present  [ ]Septic [ ]Cardiogenic [ ]Neurologic [ ]Hypovolemic  [ ]  Vasopressors [ ]  Inotropes   [ ] Respiratory failure present  [ ] Acute  [ ] Chronic [ ] Hypoxic  [ ] Hypercarbic [ ] Other  [ ] Other organ failure     LABS:            RADIOLOGY & ADDITIONAL STUDIES:    PROTEIN CALORIE MALNUTRITION: [ ] yes   [ ]no  [ ] PPSV2 < or = 30% [ ] significant weight loss [ ] poor nutritional intake [ ] anasarca [ ] catabolic state    Artificial Nutrition [ ]     REFERRALS:   [ ]Chaplaincy  [ ] Hospice  [ ]Child Life  [ ]Social Work  [ ]Case management [ ]Holistic Therapy [ ] Physical Therapy [ ] Dietary   Goals of Care Document:  GAP TEAM PALLIATIVE CARE UNIT PROGRESS NOTE:      [  ] Patient on hospice program.    INDICATION FOR PALLIATIVE CARE UNIT SERVICES:  Symptom Management     INTERVAL HPI/OVERNIGHT EVENTS:  Patient seen and examined at bedside. No acute events overnight. Endorsing some nausea when she tried to eat breakfast. No pain, SOB.     DNR on chart:   Allergies    No Known Allergies    Intolerances    MEDICATIONS  (STANDING):  apixaban 2.5 milliGRAM(s) Oral every 12 hours  atorvastatin 40 milliGRAM(s) Oral <User Schedule>  citalopram 10 milliGRAM(s) Oral <User Schedule>  metoprolol succinate  milliGRAM(s) Oral <User Schedule>  nystatin Ointment 1 Application(s) Topical two times a day  pantoprazole    Tablet 40 milliGRAM(s) Oral before breakfast  petrolatum white Ointment 1 Application(s) Topical daily  polyethylene glycol 3350 17 Gram(s) Oral daily  senna 2 Tablet(s) Oral at bedtime  zinc oxide 40% Ointment 1 Application(s) Topical two times a day    MEDICATIONS  (PRN):  acetaminophen     Tablet .. 650 milliGRAM(s) Oral every 6 hours PRN Temp greater or equal to 38C (100.4F), Mild Pain (1 - 3)  aluminum hydroxide/magnesium hydroxide/simethicone Suspension 30 milliLiter(s) Oral every 4 hours PRN Dyspepsia  bisacodyl Suppository 10 milliGRAM(s) Rectal daily PRN Constipation  melatonin 3 milliGRAM(s) Oral at bedtime PRN Insomnia  ondansetron Injectable 4 milliGRAM(s) IV Push every 8 hours PRN Nausea and/or Vomiting    ITEMS UNCHECKED ARE NOT PRESENT    PRESENT SYMPTOMS: [ ]Unable to obtain due to poor mentation     Source if other than patient:  [X ]Family   [ ]Team     Pain (Impact on QOL):  None   Location:       Minimal acceptable level (0-10 scale):  Aggravating factors:  Quality:  Radiation:  Severity (0-10 scale):     Dyspnea:                           [ ]Mild [ ]Moderate [ ]Severe  Anxiety:                             [ ]Mild [ ]Moderate [ ]Severe  Fatigue:                             [ ]Mild [ ]Moderate [ ]Severe  Nausea:                             [ ]Mild [ ]Moderate [ ]Severe  Loss of appetite:              [ ]Mild [ ]Moderate [ ]Severe  Constipation:                    [ ]Mild [ ]Moderate [ ]Severe    PAINAD Score: 0    [email or web address] (Ctrl +  left click to view)  		  Other Symptoms:  [X ]All other review of systems negative     Karnofsky Performance Score/Palliative Performance Status Version 2:         %        http://palliative.info/resource_material/[email or web address].pdf    PHYSICAL EXAM:    Vital Signs Last 24 Hrs  T(C): 36.4 (18 Feb 2022 07:42), Max: 36.4 (18 Feb 2022 07:42)  T(F): 97.6 (18 Feb 2022 07:42), Max: 97.6 (18 Feb 2022 07:42)  HR: 83 (18 Feb 2022 07:42) (83 - 83)  BP: 153/66 (18 Feb 2022 07:42) (153/66 - 153/66)  BP(mean): --  RR: 18 (18 Feb 2022 07:42) (18 - 18)  SpO2: 95% (18 Feb 2022 07:42) (95% - 95%) I&O's Summary    17 Feb 2022 07:01  -  18 Feb 2022 07:00  --------------------------------------------------------  IN: 0 mL / OUT: 200 mL / NET: -200 mL    GENERAL:  [ X]Alert  [ X]Oriented x   [ ]Lethargic  [ ]Cachexia  [ ]Unarousable  [ ]Verbal  [ ]Non-Verbal    Behavioral:   [ ] Anxiety  [ ] Delirium [ ] Agitation [ ] Other    HEENT:  [X ]Normal   [ ]Dry mouth   [ ]ET Tube/Trach  [ ]Oral lesions    PULMONARY:   [ X]Clear [ ]Tachypnea  [ ]Audible excessive secretions   [ ]Rhonchi        [ ]Right [ ]Left [ ]Bilateral  [ ]Crackles        [ ]Right [ ]Left [ ]Bilateral  [ ]Wheezing     [ ]Right [ ]Left [ ]Bilateral    CARDIOVASCULAR:    [X ]Regular [ ]Irregular [ ]Tachy  [ ]Chao [ ]Murmur [ ]Other    GASTROINTESTINAL:  [X ]Soft  [ ]Distended   [ ]+BS  [ ]Non tender [ ]Tender  [ ]PEG [ ]OGT/ NGT   Last BM:   GENITOURINARY:  [X ]Normal [ ] Incontinent   [ ]Oliguria/Anuria   [ ]Lopez    MUSCULOSKELETAL:   [X ]Normal   [ ]Weakness  [ ]Bed/Wheelchair bound [ ]Edema    NEUROLOGIC:   [ X]No focal deficits  [ ] Cognitive impairment  [ ] Dysphagia [ ]Dysarthria [ ] Paresis [ ]Other     SKIN:   [X ]Normal   [ ]Pressure ulcer(s)  [ ]Rash    CRITICAL CARE:  [ ] Shock Present  [ ]Septic [ ]Cardiogenic [ ]Neurologic [ ]Hypovolemic  [ ]  Vasopressors [ ]  Inotropes   [ ] Respiratory failure present  [ ] Acute  [ ] Chronic [ ] Hypoxic  [ ] Hypercarbic [ ] Other  [ ] Other organ failure     LABS:    RADIOLOGY & ADDITIONAL STUDIES:    PROTEIN CALORIE MALNUTRITION: [ ] yes   [ ]no  [ ] PPSV2 < or = 30% [ ] significant weight loss [ ] poor nutritional intake [ ] anasarca [ ] catabolic state    Artificial Nutrition [ ]     REFERRALS:   [ ]Chaplaincy  [ ] Hospice  [ ]Child Life  [ ]Social Work  [ ]Case management [ ]Holistic Therapy [ ] Physical Therapy [ ] Dietary   Goals of Care Document:  GAP TEAM PALLIATIVE CARE UNIT PROGRESS NOTE:      [  ] Patient on hospice program.    INDICATION FOR PALLIATIVE CARE UNIT SERVICES:  Symptom Management     INTERVAL HPI/OVERNIGHT EVENTS:  Patient seen and examined at bedside. No acute events overnight. Reporting some nausea when she tried to eat breakfast. No pain, SOB.     DNR on chart:   Allergies    No Known Allergies    Intolerances    MEDICATIONS  (STANDING):  apixaban 2.5 milliGRAM(s) Oral every 12 hours  atorvastatin 40 milliGRAM(s) Oral <User Schedule>  citalopram 10 milliGRAM(s) Oral <User Schedule>  metoprolol succinate  milliGRAM(s) Oral <User Schedule>  nystatin Ointment 1 Application(s) Topical two times a day  pantoprazole    Tablet 40 milliGRAM(s) Oral before breakfast  petrolatum white Ointment 1 Application(s) Topical daily  polyethylene glycol 3350 17 Gram(s) Oral daily  senna 2 Tablet(s) Oral at bedtime  zinc oxide 40% Ointment 1 Application(s) Topical two times a day    MEDICATIONS  (PRN):  acetaminophen     Tablet .. 650 milliGRAM(s) Oral every 6 hours PRN Temp greater or equal to 38C (100.4F), Mild Pain (1 - 3)  aluminum hydroxide/magnesium hydroxide/simethicone Suspension 30 milliLiter(s) Oral every 4 hours PRN Dyspepsia  bisacodyl Suppository 10 milliGRAM(s) Rectal daily PRN Constipation  melatonin 3 milliGRAM(s) Oral at bedtime PRN Insomnia  ondansetron Injectable 4 milliGRAM(s) IV Push every 8 hours PRN Nausea and/or Vomiting    ITEMS UNCHECKED ARE NOT PRESENT    PRESENT SYMPTOMS: [ ]Unable to obtain due to poor mentation     Source if other than patient:  [X ]Family   [ ]Team     Pain (Impact on QOL):  None   Location:       Minimal acceptable level (0-10 scale):  Aggravating factors:  Quality:  Radiation:  Severity (0-10 scale):     Dyspnea:                           [ ]Mild [ ]Moderate [ ]Severe  Anxiety:                             [ ]Mild [ ]Moderate [ ]Severe  Fatigue:                             [ ]Mild [ ]Moderate [ ]Severe  Nausea:                             [ ]Mild [ ]Moderate [ ]Severe  Loss of appetite:              [ ]Mild [ ]Moderate [ ]Severe  Constipation:                    [ ]Mild [ ]Moderate [ ]Severe    PAINAD Score: 0    [email or web address]/[email or web address] (Ctrl +  left click to view)  		  Other Symptoms:  [X ]All other review of systems negative     Karnofsky Performance Score/Palliative Performance Status Version 2:   40      %        http://palliative.info/resource_material/[email or web address].pdf    PHYSICAL EXAM:    Vital Signs Last 24 Hrs  T(C): 36.4 (18 Feb 2022 07:42), Max: 36.4 (18 Feb 2022 07:42)  T(F): 97.6 (18 Feb 2022 07:42), Max: 97.6 (18 Feb 2022 07:42)  HR: 83 (18 Feb 2022 07:42) (83 - 83)  BP: 153/66 (18 Feb 2022 07:42) (153/66 - 153/66)  BP(mean): --  RR: 18 (18 Feb 2022 07:42) (18 - 18)  SpO2: 95% (18 Feb 2022 07:42) (95% - 95%) I&O's Summary    17 Feb 2022 07:01  -  18 Feb 2022 07:00  --------------------------------------------------------  IN: 0 mL / OUT: 200 mL / NET: -200 mL    GENERAL:  [ X]Alert  [ X]Oriented x   [ ]Lethargic  [ ]Cachexia  [ ]Unarousable  [ ]Verbal  [ ]Non-Verbal    Behavioral:   [ ] Anxiety  [ ] Delirium [ ] Agitation [ ] Other    HEENT:  [X ]Normal   [ ]Dry mouth   [ ]ET Tube/Trach  [ ]Oral lesions    PULMONARY:   [ X]Clear [ ]Tachypnea  [ ]Audible excessive secretions   [ ]Rhonchi        [ ]Right [ ]Left [ ]Bilateral  [ ]Crackles        [ ]Right [ ]Left [ ]Bilateral  [ ]Wheezing     [ ]Right [ ]Left [ ]Bilateral    CARDIOVASCULAR:    [X ]Regular [ ]Irregular [ ]Tachy  [ ]Chao [ ]Murmur [ ]Other    GASTROINTESTINAL:  [X ]Soft  [ ]Distended   [ ]+BS  [ ]Non tender [ ]Tender  [ ]PEG [ ]OGT/ NGT   Last BM:   GENITOURINARY:  [X ]Normal [ ] Incontinent   [ ]Oliguria/Anuria   [ ]Lopez    MUSCULOSKELETAL:   [X ]Normal   [ ]Weakness  [ ]Bed/Wheelchair bound [ ]Edema    NEUROLOGIC:   [ X]No focal deficits  [ ] Cognitive impairment  [ ] Dysphagia [ ]Dysarthria [ ] Paresis [ ]Other     SKIN:   [X ]Normal   [ ]Pressure ulcer(s)  [ ]Rash    CRITICAL CARE:  [ ] Shock Present  [ ]Septic [ ]Cardiogenic [ ]Neurologic [ ]Hypovolemic  [ ]  Vasopressors [ ]  Inotropes   [ ] Respiratory failure present  [ ] Acute  [ ] Chronic [ ] Hypoxic  [ ] Hypercarbic [ ] Other  [ ] Other organ failure     LABS:    RADIOLOGY & ADDITIONAL STUDIES:    PROTEIN CALORIE MALNUTRITION: [ ] yes   [ ]no  [ ] PPSV2 < or = 30% [ ] significant weight loss [ ] poor nutritional intake [ ] anasarca [ ] catabolic state    Artificial Nutrition [ ]     REFERRALS:   [ ]Chaplaincy  [ ] Hospice  [ ]Child Life  [ ]Social Work  [ ]Case management [ ]Holistic Therapy [ ] Physical Therapy [ ] Dietary   Goals of Care Document:

## 2022-02-19 DIAGNOSIS — F41.9 ANXIETY DISORDER, UNSPECIFIED: ICD-10-CM

## 2022-02-19 PROCEDURE — 99233 SBSQ HOSP IP/OBS HIGH 50: CPT | Mod: GC

## 2022-02-19 RX ORDER — FAMOTIDINE 10 MG/ML
20 INJECTION INTRAVENOUS DAILY
Refills: 0 | Status: DISCONTINUED | OUTPATIENT
Start: 2022-02-20 | End: 2022-02-24

## 2022-02-19 RX ADMIN — APIXABAN 2.5 MILLIGRAM(S): 2.5 TABLET, FILM COATED ORAL at 21:38

## 2022-02-19 RX ADMIN — Medication 1 APPLICATION(S): at 12:23

## 2022-02-19 RX ADMIN — CITALOPRAM 10 MILLIGRAM(S): 10 TABLET, FILM COATED ORAL at 21:37

## 2022-02-19 RX ADMIN — ZINC OXIDE 1 APPLICATION(S): 200 OINTMENT TOPICAL at 18:30

## 2022-02-19 RX ADMIN — PANTOPRAZOLE SODIUM 40 MILLIGRAM(S): 20 TABLET, DELAYED RELEASE ORAL at 10:18

## 2022-02-19 RX ADMIN — Medication 100 MILLIGRAM(S): at 10:18

## 2022-02-19 RX ADMIN — Medication 100 MILLIGRAM(S): at 21:37

## 2022-02-19 RX ADMIN — ZINC OXIDE 1 APPLICATION(S): 200 OINTMENT TOPICAL at 06:12

## 2022-02-19 RX ADMIN — APIXABAN 2.5 MILLIGRAM(S): 2.5 TABLET, FILM COATED ORAL at 10:18

## 2022-02-19 RX ADMIN — NYSTATIN CREAM 1 APPLICATION(S): 100000 CREAM TOPICAL at 06:12

## 2022-02-19 RX ADMIN — NYSTATIN CREAM 1 APPLICATION(S): 100000 CREAM TOPICAL at 18:30

## 2022-02-19 RX ADMIN — POLYETHYLENE GLYCOL 3350 17 GRAM(S): 17 POWDER, FOR SOLUTION ORAL at 12:22

## 2022-02-19 NOTE — PROGRESS NOTE ADULT - PROBLEM SELECTOR PLAN 2
Family reports that patient took a small amount of xanax when she would get anxious in the past. No controlled substance history seen on [facility] Reference #: [id number]. Discussed with patient and family that in order to reduce the pill burden that patient was complaining about, a small dose of IV ativan is available but that it does carry potential side effects including sedation, confusion, and lightheadedness/dizziness. Patient was much more in favor of not taking benzos out of concern for potential adverse effects. Patient not complaining of active anxiety at this time. Continue SSRI.

## 2022-02-19 NOTE — PROGRESS NOTE ADULT - SUBJECTIVE AND OBJECTIVE BOX
General Sunscreen Counseling: I recommended a broad spectrum sunscreen with a SPF of 30 or higher.  I explained that SPF 30 sunscreens block approximately 97 percent of the sun's harmful rays.  Sunscreens should be applied at least 15 minutes prior to expected sun exposure and then every 2 hours after that as long as sun exposure continues. If swimming or exercising sunscreen should be reapplied every 45 minutes to an hour after getting wet or sweating.  One ounce, or the equivalent of a shot glass full of sunscreen, is adequate to protect the skin not covered by a bathing suit. I also recommended a lip balm with a sunscreen as well. Sun protective clothing can be used in lieu of sunscreen but must be worn the entire time you are exposed to the sun's rays. Detail Level: Detailed GAP TEAM PALLIATIVE CARE UNIT PROGRESS NOTE:      [  ] Patient on hospice program.    INDICATION FOR PALLIATIVE CARE UNIT SERVICES: symptom management    INTERVAL HPI/OVERNIGHT EVENTS: 8a-8a PRNs: none. Patient alert this am. Reports some loss of appetite and disinterest in the hospital food. Denies other acute complaints.    DNR on chart:   Allergies    No Known Allergies    Intolerances    MEDICATIONS  (STANDING):  apixaban 2.5 milliGRAM(s) Oral every 12 hours  atorvastatin 40 milliGRAM(s) Oral <User Schedule>  citalopram 10 milliGRAM(s) Oral <User Schedule>  metoprolol succinate  milliGRAM(s) Oral <User Schedule>  nystatin Ointment 1 Application(s) Topical two times a day  petrolatum white Ointment 1 Application(s) Topical daily  polyethylene glycol 3350 17 Gram(s) Oral daily  zinc oxide 40% Ointment 1 Application(s) Topical two times a day    MEDICATIONS  (PRN):  acetaminophen     Tablet .. 650 milliGRAM(s) Oral every 6 hours PRN Temp greater or equal to 38C (100.4F), Mild Pain (1 - 3)  aluminum hydroxide/magnesium hydroxide/simethicone Suspension 30 milliLiter(s) Oral every 4 hours PRN Dyspepsia  bisacodyl Suppository 10 milliGRAM(s) Rectal daily PRN Constipation  ondansetron Injectable 4 milliGRAM(s) IV Push every 8 hours PRN Nausea and/or Vomiting    ITEMS UNCHECKED ARE NOT PRESENT    PRESENT SYMPTOMS: [ ]Unable to obtain due to poor mentation   Source if other than patient:  [ ]Family   [ ]Team     Pain: [ ] yes [x ] no  QOL impact -   Location -                    Aggravating factors -  Quality -  Radiation -  Timing-  Severity (0-10 scale):  Minimal acceptable level (0-10 scale):     Dyspnea:                           [ ]Mild [ ]Moderate [ ]Severe  Anxiety:                             [ ]Mild [ ]Moderate [ ]Severe  Fatigue:                             [ ]Mild [ ]Moderate [ ]Severe  Nausea:                             [ ]Mild [ ]Moderate [ ]Severe  Loss of appetite:              [ ]Mild [ ]Moderate [ ]Severe  Constipation:                    [ ]Mild [ ]Moderate [ ]Severe    PAINAD Score:    [email or web address] (Ctrl +  left click to view)  		  Other Symptoms:  [x]All other review of systems negative     Palliative Performance Status Version 2:    est 40     %         [email or web address]/files/[email or web address]_performance_[email or web address]_[email or web address].pdf  PHYSICAL EXAM:  Vital Signs Last 24 Hrs  T(C): 37 (19 Feb 2022 08:48), Max: 37 (19 Feb 2022 08:48)  T(F): 98.6 (19 Feb 2022 08:48), Max: 98.6 (19 Feb 2022 08:48)  HR: 80 (19 Feb 2022 08:48) (80 - 80)  BP: 135/60 (19 Feb 2022 08:48) (135/60 - 135/60)  BP(mean): --  RR: 18 (19 Feb 2022 08:48) (18 - 18)  SpO2: 97% (19 Feb 2022 08:48) (97% - 97%) I&O's Summary  GENERAL:  [x ]Alert  [ ]Oriented x   [ ]Lethargic  [ ]Cachexia  [ ]Unarousable  [x ]Verbal  [ ]Non-Verbal  Behavioral:   [ ] Anxiety  [ ] Delirium [ ] Agitation [x ] Other - calm  HEENT:  [ x]Normal   [ ]Dry mouth   [ ]ET Tube/Trach  [ ]Oral lesions  PULMONARY:   [x ]Clear [ ]Tachypnea  [ ]Audible excessive secretions   [ ]Rhonchi        [ ]Right [ ]Left [ ]Bilateral  [ ]Crackles        [ ]Right [ ]Left [ ]Bilateral  [ ]Wheezing     [ ]Right [ ]Left [ ]Bilateral  [ ]Diminshed BS [ ]Right [ ]Left [ ]Bilateral    CARDIOVASCULAR:    [x ]Regular [ ]Irregular [ ]Tachy  [ ]Chao [ ]Murmur [ ]Other  GASTROINTESTINAL:  [x ]Soft  [ ]Distended   [ x]+BS  [x ]Non tender [ ]Tender  [ ]PEG [ ]OGT/ NGT   Last BM:   2/18  GENITOURINARY:  [ ]Normal [x ] Incontinent   [ ]Oliguria/Anuria   [ ]Lopez  MUSCULOSKELETAL:   [ ]Normal   [x ]Weakness  [ ]Bed/Wheelchair bound [ ]Edema  NEUROLOGIC:   [ x]No focal deficits  [ ] Cognitive impairment  [ ] Dysphagia [ ]Dysarthria [ ] Paresis [ ]Other   SKIN:   [ ]Normal  [x ]Rash   - IAD, MAD  [ ]Pressure ulcer(s)  [ ]y [ ]n  Present on admission      CRITICAL CARE:  [ ] Shock Present  [ ]Septic [ ]Cardiogenic [ ]Neurologic [ ]Hypovolemic  [ ]  Vasopressors [ ]  Inotropes   [ ] Respiratory failure present [ ] Mechanical Ventilation [ ] Non-invasive ventilatory support [ ] High-Flow  [ ] Acute  [ ] Chronic [ ] Hypoxic  [ ] Hypercarbic [ ] Other  [ ] Other organ failure     LABS: none new        RADIOLOGY & ADDITIONAL STUDIES: none new    PROTEIN CALORIE MALNUTRITION: [ ] mild [ ] moderate [ ] severe  [ ] underweight [ ] morbid obesity    https://www.andeal.org/vault/2440/web/files/ONC/Table_Clinical%20Characteristics%20to%20Document%20Malnutrition-White%20JV%20et%20al%202012.pdf    Height (cm): 162.6 (02-10-22 @ 15:21)  Weight (kg): 49.9 (02-10-22 @ 15:21)  BMI (kg/m2): 18.9 (02-10-22 @ 15:21)    [ ] PPSV2 < or = 30% [ ] significant weight loss [ ] poor nutritional intake [ ] anasarca Artificial Nutrition [ ]     REFERRALS:   [ ]Chaplaincy  [ ] Hospice  [ ]Child Life  [x ]Social Work  [ ]Case management [ ]Holistic Therapy [ ] Physical Therapy [ ] Dietary   Goals of Care Document:

## 2022-02-19 NOTE — PROGRESS NOTE ADULT - PROBLEM SELECTOR PLAN 6
Continue symptom management in the pcu. Family will try to figure out disposition goal (home w/ hospice and [facility] vs nursing home).

## 2022-02-20 PROCEDURE — 99233 SBSQ HOSP IP/OBS HIGH 50: CPT | Mod: GC

## 2022-02-20 RX ORDER — SALIVA SUBSTITUTE COMB NO.11 351 MG
15 POWDER IN PACKET (EA) MUCOUS MEMBRANE EVERY 6 HOURS
Refills: 0 | Status: DISCONTINUED | OUTPATIENT
Start: 2022-02-20 | End: 2022-02-24

## 2022-02-20 RX ORDER — POLYETHYLENE GLYCOL 3350 17 G/17G
17 POWDER, FOR SOLUTION ORAL DAILY
Refills: 0 | Status: DISCONTINUED | OUTPATIENT
Start: 2022-02-20 | End: 2022-02-24

## 2022-02-20 RX ORDER — ACETAMINOPHEN 500 MG
650 TABLET ORAL EVERY 6 HOURS
Refills: 0 | Status: DISCONTINUED | OUTPATIENT
Start: 2022-02-20 | End: 2022-02-24

## 2022-02-20 RX ADMIN — Medication 15 MILLILITER(S): at 11:34

## 2022-02-20 RX ADMIN — CITALOPRAM 10 MILLIGRAM(S): 10 TABLET, FILM COATED ORAL at 21:30

## 2022-02-20 RX ADMIN — NYSTATIN CREAM 1 APPLICATION(S): 100000 CREAM TOPICAL at 17:04

## 2022-02-20 RX ADMIN — APIXABAN 2.5 MILLIGRAM(S): 2.5 TABLET, FILM COATED ORAL at 10:51

## 2022-02-20 RX ADMIN — APIXABAN 2.5 MILLIGRAM(S): 2.5 TABLET, FILM COATED ORAL at 21:30

## 2022-02-20 RX ADMIN — Medication 100 MILLIGRAM(S): at 10:50

## 2022-02-20 RX ADMIN — Medication 100 MILLIGRAM(S): at 21:31

## 2022-02-20 RX ADMIN — ZINC OXIDE 1 APPLICATION(S): 200 OINTMENT TOPICAL at 17:04

## 2022-02-20 RX ADMIN — NYSTATIN CREAM 1 APPLICATION(S): 100000 CREAM TOPICAL at 05:21

## 2022-02-20 RX ADMIN — FAMOTIDINE 20 MILLIGRAM(S): 10 INJECTION INTRAVENOUS at 11:29

## 2022-02-20 RX ADMIN — ZINC OXIDE 1 APPLICATION(S): 200 OINTMENT TOPICAL at 05:21

## 2022-02-20 RX ADMIN — Medication 1 APPLICATION(S): at 11:46

## 2022-02-20 NOTE — PROGRESS NOTE ADULT - PROBLEM SELECTOR PLAN 2
Family reports that patient took a small amount of xanax when she would get anxious in the past. No controlled substance history seen on [facility] Reference #: [id number]. Discussed with patient and family that in order to reduce the pill burden that patient was complaining about, a small dose of IV ativan is available but that it does carry potential side effects including sedation, confusion, and lightheadedness/dizziness. Patient was much more in favor of not taking benzos out of concern for potential adverse effects. Patient also likes to avoid taking medications if she can. Patient not complaining of active anxiety at this time. Continue SSRI.

## 2022-02-20 NOTE — PROGRESS NOTE ADULT - PROBLEM SELECTOR PLAN 1
Multifactorial- acute on chronic diastolic HF, AS  No complaints at this time, comfortable on 3L NC, will try to wean

## 2022-02-20 NOTE — PROGRESS NOTE ADULT - SUBJECTIVE AND OBJECTIVE BOX
GAP TEAM PALLIATIVE CARE UNIT PROGRESS NOTE:      [  ] Patient on hospice program.    INDICATION FOR PALLIATIVE CARE UNIT SERVICES: symptom management    INTERVAL HPI/OVERNIGHT EVENTS: sitting up and eating this AM. Denies active complaints this AM. Family reports that patient had some anxiety and racing thoughts last night before going to sleep but the patient didn't relay it to the staff as she has a tendency to downplay her symptoms. Patient wants to continue to avoid additional medications.    DNR on chart:   Allergies    No Known Allergies    Intolerances    MEDICATIONS  (STANDING):  apixaban 2.5 milliGRAM(s) Oral every 12 hours  citalopram 10 milliGRAM(s) Oral <User Schedule>  famotidine   Suspension 20 milliGRAM(s) Oral daily  metoprolol succinate  milliGRAM(s) Oral <User Schedule>  nystatin Ointment 1 Application(s) Topical two times a day  petrolatum white Ointment 1 Application(s) Topical daily  zinc oxide 40% Ointment 1 Application(s) Topical two times a day    MEDICATIONS  (PRN):  acetaminophen    Suspension .. 650 milliGRAM(s) Oral every 6 hours PRN Temp greater or equal to 38C (100.4F), Mild Pain (1 - 3)  aluminum hydroxide/magnesium hydroxide/simethicone Suspension 30 milliLiter(s) Oral every 4 hours PRN Dyspepsia  Biotene Dry Mouth Oral Rinse 15 milliLiter(s) Swish and Spit every 6 hours PRN Mouth Care, Dry Mouth  bisacodyl Suppository 10 milliGRAM(s) Rectal daily PRN Constipation  ondansetron Injectable 4 milliGRAM(s) IV Push every 8 hours PRN Nausea and/or Vomiting  polyethylene glycol 3350 17 Gram(s) Oral daily PRN Constipation    ITEMS UNCHECKED ARE NOT PRESENT    PRESENT SYMPTOMS: [ ]Unable to obtain due to poor mentation   Source if other than patient:  [ ]Family   [ ]Team     Pain: [ ] yes [x ] no  QOL impact -   Location -                    Aggravating factors -  Quality -  Radiation -  Timing-  Severity (0-10 scale):  Minimal acceptable level (0-10 scale):     Dyspnea:                           [ ]Mild [ ]Moderate [ ]Severe  Anxiety:                             [ ]Mild [ ]Moderate [ ]Severe  Fatigue:                             [ ]Mild [ ]Moderate [ ]Severe  Nausea:                             [ ]Mild [ ]Moderate [ ]Severe  Loss of appetite:              [ ]Mild [ ]Moderate [ ]Severe  Constipation:                    [ ]Mild [ ]Moderate [ ]Severe    PAINAD Score:    [email or web address] (Ctrl +  left click to view)  		  Other Symptoms:  [x]All other review of systems negative     Palliative Performance Status Version 2:    30-40     %         http://[email or web address]/files/[email or web address]_performance_[email or web address]_[email or web address].pdf  PHYSICAL EXAM:  Vital Signs Last 24 Hrs  T(C): 37.1 (20 Feb 2022 08:13), Max: 37.1 (20 Feb 2022 08:13)  T(F): 98.8 (20 Feb 2022 08:13), Max: 98.8 (20 Feb 2022 08:13)  HR: 85 (20 Feb 2022 08:13) (76 - 85)  BP: 143/64 (20 Feb 2022 08:13) (143/64 - 159/63)  BP(mean): --  RR: 18 (20 Feb 2022 08:13) (18 - 18)  SpO2: 99% (20 Feb 2022 08:13) (99% - 99%) I&O's Summary  GENERAL:  [ x]Alert  [ ]Oriented x   [ ]Lethargic  [ ]Cachexia  [ ]Unarousable  [x ]Verbal  [ ]Non-Verbal  Behavioral:   [ ] Anxiety  [ ] Delirium [ ] Agitation [x ] Other - calm  HEENT:  [ ]Normal   [ x]Dry mouth   [ ]ET Tube/Trach  [ ]Oral lesions  PULMONARY:   [x ]Clear [ ]Tachypnea  [ ]Audible excessive secretions   [ ]Rhonchi        [ ]Right [ ]Left [ ]Bilateral  [ ]Crackles        [ ]Right [ ]Left [ ]Bilateral  [ ]Wheezing     [ ]Right [ ]Left [ ]Bilateral  [ ]Diminshed BS [ ]Right [ ]Left [ ]Bilateral    CARDIOVASCULAR:    [x ]Regular [ ]Irregular [ ]Tachy  [ ]Chao [ ]Murmur [ ]Other  GASTROINTESTINAL:  [ x]Soft  [ ]Distended   [x ]+BS  [x ]Non tender [ ]Tender  [ ]PEG [ ]OGT/ NGT   Last BM:   2/18  GENITOURINARY:  [ ]Normal [x ] Incontinent   [ ]Oliguria/Anuria   [ ]Lopez  MUSCULOSKELETAL:   [ ]Normal   [x ]Weakness  [ ]Bed/Wheelchair bound [ ]Edema  NEUROLOGIC:   [ x]No focal deficits  [ ] Cognitive impairment  [ ] Dysphagia [ ]Dysarthria [ ] Paresis [ ]Other   SKIN:   [ ]Normal  [ x]Rash   - MAD, IAD  [ ]Pressure ulcer(s)  [ ]y [ ]n  Present on admission      CRITICAL CARE:  [ ] Shock Present  [ ]Septic [ ]Cardiogenic [ ]Neurologic [ ]Hypovolemic  [ ]  Vasopressors [ ]  Inotropes   [ ] Respiratory failure present [ ] Mechanical Ventilation [ ] Non-invasive ventilatory support [ ] High-Flow  [ ] Acute  [ ] Chronic [ ] Hypoxic  [ ] Hypercarbic [ ] Other  [ ] Other organ failure     LABS: none new      RADIOLOGY & ADDITIONAL STUDIES: none new    PROTEIN CALORIE MALNUTRITION: [ ] mild [ ] moderate [ ] severe  [ ] underweight [ ] morbid obesity    https://www.andeal.org/vault/2440/web/files/ONC/Table_Clinical%20Characteristics%20to%20Document%20Malnutrition-White%20JV%20et%20al%253876.pdf    Height (cm): 162.6 (02-10-22 @ 15:21)  Weight (kg): 49.9 (02-10-22 @ 15:21)  BMI (kg/m2): 18.9 (02-10-22 @ 15:21)    [x ] PPSV2 < or = 30% [ ] significant weight loss [x ] poor nutritional intake [ ] anasarca Artificial Nutrition [ ]     REFERRALS:   [ ]Chaplaincy  [ ] Hospice  [ ]Child Life  [x ]Social Work  [ ]Case management [ ]Holistic Therapy [ ] Physical Therapy [ ] Dietary   Goals of Care Document:

## 2022-02-21 PROCEDURE — 99233 SBSQ HOSP IP/OBS HIGH 50: CPT | Mod: GC

## 2022-02-21 RX ORDER — LANOLIN ALCOHOL/MO/W.PET/CERES
3 CREAM (GRAM) TOPICAL ONCE
Refills: 0 | Status: COMPLETED | OUTPATIENT
Start: 2022-02-21 | End: 2022-02-21

## 2022-02-21 RX ADMIN — Medication 100 MILLIGRAM(S): at 09:28

## 2022-02-21 RX ADMIN — APIXABAN 2.5 MILLIGRAM(S): 2.5 TABLET, FILM COATED ORAL at 22:04

## 2022-02-21 RX ADMIN — ZINC OXIDE 1 APPLICATION(S): 200 OINTMENT TOPICAL at 05:56

## 2022-02-21 RX ADMIN — Medication 3 MILLIGRAM(S): at 00:46

## 2022-02-21 RX ADMIN — NYSTATIN CREAM 1 APPLICATION(S): 100000 CREAM TOPICAL at 05:57

## 2022-02-21 RX ADMIN — ZINC OXIDE 1 APPLICATION(S): 200 OINTMENT TOPICAL at 18:15

## 2022-02-21 RX ADMIN — APIXABAN 2.5 MILLIGRAM(S): 2.5 TABLET, FILM COATED ORAL at 09:28

## 2022-02-21 RX ADMIN — Medication 0.25 MILLIGRAM(S): at 21:59

## 2022-02-21 RX ADMIN — Medication 100 MILLIGRAM(S): at 22:04

## 2022-02-21 RX ADMIN — FAMOTIDINE 20 MILLIGRAM(S): 10 INJECTION INTRAVENOUS at 20:43

## 2022-02-21 RX ADMIN — CITALOPRAM 10 MILLIGRAM(S): 10 TABLET, FILM COATED ORAL at 22:50

## 2022-02-21 RX ADMIN — NYSTATIN CREAM 1 APPLICATION(S): 100000 CREAM TOPICAL at 18:16

## 2022-02-21 NOTE — PROGRESS NOTE ADULT - PROBLEM SELECTOR PLAN 2
Family reports that patient took a small amount of xanax when she would get anxious in the past. No controlled substance history seen on [facility] Reference #: [id number]. Discussed option of trialing ativan here but potential side effects including sedation, confusion, and lightheadedness/dizziness. Patient was much more in favor of not taking benzos out of concern for potential adverse effects. Patient also likes to avoid taking medications in general if she can. Family notes that patient is very sensitive to psychoactive medications. Patient not complaining of active anxiety at this time. Family notes that patient is not very forward with staff regarding any complaints she may have. Discussed with son Yosvany and daughter Karina at bedside today having ativan 0.25mg tabs available on an as needed basis if patient is in agreement with taking it at that time. Continue home citalopram.

## 2022-02-21 NOTE — PROGRESS NOTE ADULT - SUBJECTIVE AND OBJECTIVE BOX
GAP TEAM PALLIATIVE CARE UNIT PROGRESS NOTE:      [  ] Patient on hospice program.    INDICATION FOR PALLIATIVE CARE UNIT SERVICES: pain and symptom management    INTERVAL HPI/OVERNIGHT EVENTS: 8a-8a PRNs: none. Patient denying active complaints this AM.    DNR on chart:   Allergies    No Known Allergies    Intolerances    MEDICATIONS  (STANDING):  apixaban 2.5 milliGRAM(s) Oral every 12 hours  citalopram 10 milliGRAM(s) Oral <User Schedule>  famotidine   Suspension 20 milliGRAM(s) Oral daily  metoprolol succinate  milliGRAM(s) Oral <User Schedule>  nystatin Ointment 1 Application(s) Topical two times a day  petrolatum white Ointment 1 Application(s) Topical daily  zinc oxide 40% Ointment 1 Application(s) Topical two times a day    MEDICATIONS  (PRN):  acetaminophen    Suspension .. 650 milliGRAM(s) Oral every 6 hours PRN Temp greater or equal to 38C (100.4F), Mild Pain (1 - 3)  aluminum hydroxide/magnesium hydroxide/simethicone Suspension 30 milliLiter(s) Oral every 4 hours PRN Dyspepsia  Biotene Dry Mouth Oral Rinse 15 milliLiter(s) Swish and Spit every 6 hours PRN Mouth Care, Dry Mouth  bisacodyl Suppository 10 milliGRAM(s) Rectal daily PRN Constipation  ondansetron Injectable 4 milliGRAM(s) IV Push every 8 hours PRN Nausea and/or Vomiting  polyethylene glycol 3350 17 Gram(s) Oral daily PRN Constipation    ITEMS UNCHECKED ARE NOT PRESENT    PRESENT SYMPTOMS: [ ]Unable to obtain due to poor mentation   Source if other than patient:  [ ]Family   [ ]Team     Pain: [ ] yes [ x] no  QOL impact -   Location -                    Aggravating factors -  Quality -  Radiation -  Timing-  Severity (0-10 scale):  Minimal acceptable level (0-10 scale):     Dyspnea:                           [ ]Mild [ ]Moderate [ ]Severe  Anxiety:                             [ ]Mild [ ]Moderate [ ]Severe  Fatigue:                             [ ]Mild [ ]Moderate [ ]Severe  Nausea:                             [ ]Mild [ ]Moderate [ ]Severe  Loss of appetite:              [ ]Mild [ ]Moderate [ ]Severe  Constipation:                    [ ]Mild [ ]Moderate [ ]Severe    PAINAD Score:  0    [email or web address] (Ctrl +  left click to view)  		  Other Symptoms:  [x]All other review of systems negative     Palliative Performance Status Version 2:     30-40    %         http://[facility]pcrc.org/files/news/palliative_performance_scale_[email or web address].pdf  PHYSICAL EXAM:  Vital Signs Last 24 Hrs  T(C): 36.3 (21 Feb 2022 10:19), Max: 36.3 (21 Feb 2022 10:19)  T(F): 97.3 (21 Feb 2022 10:19), Max: 97.3 (21 Feb 2022 10:19)  HR: 82 (21 Feb 2022 10:19) (78 - 82)  BP: 105/63 (21 Feb 2022 10:19) (105/63 - 141/63)  BP(mean): --  RR: 18 (21 Feb 2022 10:19) (18 - 18)  SpO2: 99% (21 Feb 2022 10:19) (99% - 99%) I&O's Summary  GENERAL:  [x ]Alert  [ ]Oriented x   [ ]Lethargic  [ ]Cachexia  [ ]Unarousable  [x ]Verbal  [ ]Non-Verbal  Behavioral:   [ ] Anxiety  [ ] Delirium [ ] Agitation [x ] Other - calm  HEENT:  [ ]Normal   [x ]Dry mouth   [ ]ET Tube/Trach  [ ]Oral lesions  PULMONARY:   [x ]Clear [ ]Tachypnea  [ ]Audible excessive secretions   [ ]Rhonchi        [ ]Right [ ]Left [ ]Bilateral  [ ]Crackles        [ ]Right [ ]Left [ ]Bilateral  [ ]Wheezing     [ ]Right [ ]Left [ ]Bilateral  [ ]Diminshed BS [ ]Right [ ]Left [ ]Bilateral    CARDIOVASCULAR:    [x ]Regular [ ]Irregular [ ]Tachy  [ ]Chao [ ]Murmur [ ]Other  GASTROINTESTINAL:  [x ]Soft  [ ]Distended   [x ]+BS  [x ]Non tender [ ]Tender  [ ]PEG [ ]OGT/ NGT   Last BM:   2/18  GENITOURINARY:  [ ]Normal [x ] Incontinent   [ ]Oliguria/Anuria   [ ]Lopez  MUSCULOSKELETAL:   [ ]Normal   [x ]Weakness  [ ]Bed/Wheelchair bound [ ]Edema  NEUROLOGIC:   [x ]No focal deficits  [ ] Cognitive impairment  [ ] Dysphagia [ ]Dysarthria [ ] Paresis [ ]Other   SKIN:   [ ]Normal  [x ]Rash  - MAD, IAD  [ ]Pressure ulcer(s)  [ ]y [ ]n  Present on admission      CRITICAL CARE:  [ ] Shock Present  [ ]Septic [ ]Cardiogenic [ ]Neurologic [ ]Hypovolemic  [ ]  Vasopressors [ ]  Inotropes   [ ] Respiratory failure present [ ] Mechanical Ventilation [ ] Non-invasive ventilatory support [ ] High-Flow  [ ] Acute  [ ] Chronic [ ] Hypoxic  [ ] Hypercarbic [ ] Other  [ ] Other organ failure     LABS: none new      RADIOLOGY & ADDITIONAL STUDIES: none new    PROTEIN CALORIE MALNUTRITION: [ ] mild [ ] moderate [ ] severe  [ ] underweight [ ] morbid obesity    https://www.andeal.org/vault/2440/web/files/ONC/Table_Clinical%20Characteristics%20to%20Document%20Malnutrition-White%20JV%20et%20al%477329.pdf    Height (cm): 162.6 (02-10-22 @ 15:21)  Weight (kg): 49.9 (02-10-22 @ 15:21)  BMI (kg/m2): 18.9 (02-10-22 @ 15:21)    [ x] PPSV2 < or = 30% [ ] significant weight loss [x ] poor nutritional intake [ ] anasarca Artificial Nutrition [ ]     REFERRALS:   [ ]Chaplaincy  [ ] Hospice  [ ]Child Life  [x ]Social Work  [ ]Case management [ ]Holistic Therapy [ ] Physical Therapy [ ] Dietary   Goals of Care Document:

## 2022-02-21 NOTE — PROGRESS NOTE ADULT - PROBLEM SELECTOR PLAN 1
Multifactorial- acute on chronic diastolic HF, AS, CKD  No complaints at this time, comfortable on 3L NC, will try to wean

## 2022-02-22 PROCEDURE — 99232 SBSQ HOSP IP/OBS MODERATE 35: CPT

## 2022-02-22 RX ADMIN — NYSTATIN CREAM 1 APPLICATION(S): 100000 CREAM TOPICAL at 05:08

## 2022-02-22 RX ADMIN — ZINC OXIDE 1 APPLICATION(S): 200 OINTMENT TOPICAL at 18:18

## 2022-02-22 RX ADMIN — APIXABAN 2.5 MILLIGRAM(S): 2.5 TABLET, FILM COATED ORAL at 21:57

## 2022-02-22 RX ADMIN — Medication 0.25 MILLIGRAM(S): at 23:45

## 2022-02-22 RX ADMIN — FAMOTIDINE 20 MILLIGRAM(S): 10 INJECTION INTRAVENOUS at 13:42

## 2022-02-22 RX ADMIN — CITALOPRAM 10 MILLIGRAM(S): 10 TABLET, FILM COATED ORAL at 21:57

## 2022-02-22 RX ADMIN — ZINC OXIDE 1 APPLICATION(S): 200 OINTMENT TOPICAL at 05:08

## 2022-02-22 RX ADMIN — Medication 100 MILLIGRAM(S): at 21:57

## 2022-02-22 RX ADMIN — Medication 1 APPLICATION(S): at 12:40

## 2022-02-22 RX ADMIN — APIXABAN 2.5 MILLIGRAM(S): 2.5 TABLET, FILM COATED ORAL at 10:09

## 2022-02-22 RX ADMIN — NYSTATIN CREAM 1 APPLICATION(S): 100000 CREAM TOPICAL at 18:18

## 2022-02-22 RX ADMIN — Medication 100 MILLIGRAM(S): at 10:09

## 2022-02-22 NOTE — PROGRESS NOTE ADULT - PROBLEM SELECTOR PLAN 6
Continue symptom management in the pcu. Family will try to figure out disposition goal (home w/ hospice and HHA vs nursing home). Continue symptom management in the pcu. Medically stable for discharge to [facility].

## 2022-02-22 NOTE — PROGRESS NOTE ADULT - SUBJECTIVE AND OBJECTIVE BOX
GAP TEAM PALLIATIVE CARE UNIT PROGRESS NOTE:      [  ] Patient on hospice program.    INDICATION FOR PALLIATIVE CARE UNIT SERVICES:          INTERVAL HPI/OVERNIGHT EVENTS:    DNR on chart:   Allergies    No Known Allergies    Intolerances    MEDICATIONS  (STANDING):  apixaban 2.5 milliGRAM(s) Oral every 12 hours  citalopram 10 milliGRAM(s) Oral <User Schedule>  famotidine   Suspension 20 milliGRAM(s) Oral daily  metoprolol succinate  milliGRAM(s) Oral <User Schedule>  nystatin Ointment 1 Application(s) Topical two times a day  petrolatum white Ointment 1 Application(s) Topical daily  zinc oxide 40% Ointment 1 Application(s) Topical two times a day    MEDICATIONS  (PRN):  acetaminophen    Suspension .. 650 milliGRAM(s) Oral every 6 hours PRN Temp greater or equal to 38C (100.4F), Mild Pain (1 - 3)  aluminum hydroxide/magnesium hydroxide/simethicone Suspension 30 milliLiter(s) Oral every 4 hours PRN Dyspepsia  Biotene Dry Mouth Oral Rinse 15 milliLiter(s) Swish and Spit every 6 hours PRN Mouth Care, Dry Mouth  bisacodyl Suppository 10 milliGRAM(s) Rectal daily PRN Constipation  LORazepam     Tablet 0.25 milliGRAM(s) Oral every 8 hours PRN intractable anxiety/agitation  ondansetron Injectable 4 milliGRAM(s) IV Push every 8 hours PRN Nausea and/or Vomiting  polyethylene glycol 3350 17 Gram(s) Oral daily PRN Constipation    ITEMS UNCHECKED ARE NOT PRESENT    PRESENT SYMPTOMS: [ ]Unable to obtain due to poor mentation   Source if other than patient:  [ ]Family   [ ]Team     Pain: [ ] yes [ ] no  QOL impact -   Location -                    Aggravating factors -  Quality -  Radiation -  Timing-  Severity (0-10 scale):  Minimal acceptable level (0-10 scale):     Dyspnea:                           [ ]Mild [ ]Moderate [ ]Severe  Anxiety:                             [ ]Mild [ ]Moderate [ ]Severe  Fatigue:                             [ ]Mild [ ]Moderate [ ]Severe  Nausea:                             [ ]Mild [ ]Moderate [ ]Severe  Loss of appetite:              [ ]Mild [ ]Moderate [ ]Severe  Constipation:                    [ ]Mild [ ]Moderate [ ]Severe    PAINAD Score:    [email or web address] (Ctrl +  left click to view)  		  Other Symptoms:  [ ]All other review of systems negative     Palliative Performance Status Version 2:         %         [email or web address]/[email or web address]_[email or web address]  PHYSICAL EXAM:  Vital Signs Last 24 Hrs  T(C): 36.9 (22 Feb 2022 07:58), Max: 36.9 (22 Feb 2022 07:58)  T(F): 98.4 (22 Feb 2022 07:58), Max: 98.4 (22 Feb 2022 07:58)  HR: 88 (22 Feb 2022 12:05) (78 - 88)  BP: 144/69 (22 Feb 2022 07:58) (144/69 - 149/81)  BP(mean): --  RR: 18 (22 Feb 2022 07:58) (18 - 18)  SpO2: 98% (22 Feb 2022 12:05) (94% - 100%) I&O's Summary  GENERAL:  [ ]Alert  [ ]Oriented x   [ ]Lethargic  [ ]Cachexia  [ ]Unarousable  [ ]Verbal  [ ]Non-Verbal  Behavioral:   [ ] Anxiety  [ ] Delirium [ ] Agitation [ ] Other  HEENT:  [ ]Normal   [ ]Dry mouth   [ ]ET Tube/Trach  [ ]Oral lesions  PULMONARY:   [ ]Clear [ ]Tachypnea  [ ]Audible excessive secretions   [ ]Rhonchi        [ ]Right [ ]Left [ ]Bilateral  [ ]Crackles        [ ]Right [ ]Left [ ]Bilateral  [ ]Wheezing     [ ]Right [ ]Left [ ]Bilateral  [ ]Diminished BS [ ]Right [ ]Left [ ]Bilateral    CARDIOVASCULAR:    [ ]Regular [ ]Irregular [ ]Tachy  [ ]Chao [ ]Murmur [ ]Other  GASTROINTESTINAL:  [ ]Soft  [ ]Distended   [ ]+BS  [ ]Non tender [ ]Tender  [ ]PEG [ ]OGT/ NGT   Last BM:     GENITOURINARY:  [ ]Normal [ ] Incontinent   [ ]Oliguria/Anuria   [ ]Lopez  MUSCULOSKELETAL:   [ ]Normal   [ ]Weakness  [ ]Bed/Wheelchair bound [ ]Edema  NEUROLOGIC:   [ ]No focal deficits  [ ] Cognitive impairment  [ ] Dysphagia [ ]Dysarthria [ ] Paresis [ ]Other   SKIN:   [ ]Normal  [ ]Rash     [ ]Pressure ulcer(s)  [ ]y [ ]n  Present on admission      CRITICAL CARE:  [ ] Shock Present  [ ]Septic [ ]Cardiogenic [ ]Neurologic [ ]Hypovolemic  [ ]  Vasopressors [ ]  Inotropes   [ ] Respiratory failure present [ ] Mechanical Ventilation [ ] Non-invasive ventilatory support [ ] High-Flow  [ ] Acute  [ ] Chronic [ ] Hypoxic  [ ] Hypercarbic [ ] Other  [ ] Other organ failure     LABS:            RADIOLOGY & ADDITIONAL STUDIES:    PROTEIN CALORIE MALNUTRITION: [ ] mild [ ] moderate [ ] severe  [ ] underweight [ ] morbid obesity    https://www.andeal.org/vault/2440/web/files/ONC/Table_Clinical%20Characteristics%20to%20Document%20Malnutrition-White%20JV%20et%20al%155225.pdf    Height (cm): 162.6 (02-10-22 @ 15:21)  Weight (kg): 49.9 (02-10-22 @ 15:21)  BMI (kg/m2): 18.9 (02-10-22 @ 15:21)    [ ] PPSV2 < or = 30% [ ] significant weight loss [ ] poor nutritional intake [ ] anasarca   Artificial Nutrition [ ]     REFERRALS:   [ ]Chaplaincy  [ ] Hospice  [ ]Child Life  [ ]Social Work  [ ]Case management [ ]Holistic Therapy [ ] Physical Therapy [ ] Dietary   Goals of Care Document:    GAP TEAM PALLIATIVE CARE UNIT PROGRESS NOTE:      [  ] Patient on hospice program.    INDICATION FOR PALLIATIVE CARE UNIT SERVICES:    Anxiety and pain    INTERVAL HPI/OVERNIGHT EVENTS:    Pt seen and examined at bedside. States pain is well controlled able to tolerate oral food and has been ambulating with physical therapy.    DNR on chart:   Allergies    No Known Allergies    Intolerances    MEDICATIONS  (STANDING):  apixaban 2.5 milliGRAM(s) Oral every 12 hours  citalopram 10 milliGRAM(s) Oral <User Schedule>  famotidine   Suspension 20 milliGRAM(s) Oral daily  metoprolol succinate  milliGRAM(s) Oral <User Schedule>  nystatin Ointment 1 Application(s) Topical two times a day  petrolatum white Ointment 1 Application(s) Topical daily  zinc oxide 40% Ointment 1 Application(s) Topical two times a day    MEDICATIONS  (PRN):  acetaminophen    Suspension .. 650 milliGRAM(s) Oral every 6 hours PRN Temp greater or equal to 38C (100.4F), Mild Pain (1 - 3)  aluminum hydroxide/magnesium hydroxide/simethicone Suspension 30 milliLiter(s) Oral every 4 hours PRN Dyspepsia  Biotene Dry Mouth Oral Rinse 15 milliLiter(s) Swish and Spit every 6 hours PRN Mouth Care, Dry Mouth  bisacodyl Suppository 10 milliGRAM(s) Rectal daily PRN Constipation  LORazepam     Tablet 0.25 milliGRAM(s) Oral every 8 hours PRN intractable anxiety/agitation  ondansetron Injectable 4 milliGRAM(s) IV Push every 8 hours PRN Nausea and/or Vomiting  polyethylene glycol 3350 17 Gram(s) Oral daily PRN Constipation    ITEMS UNCHECKED ARE NOT PRESENT    PRESENT SYMPTOMS: [ ]Unable to obtain due to poor mentation   Source if other than patient:  [ ]Family   [ ]Team     Pain: [ ] yes [x] no  QOL impact -   Location -                    Aggravating factors -  Quality -  Radiation -  Timing-  Severity (0-10 scale):  Minimal acceptable level (0-10 scale):     Dyspnea:                           [ ]Mild [ ]Moderate [ ]Severe  Anxiety:                             [ ]Mild [ ]Moderate [ ]Severe  Fatigue:                             [ ]Mild [ ]Moderate [ ]Severe  Nausea:                             [ ]Mild [ ]Moderate [ ]Severe  Loss of appetite:              [ ]Mild [ ]Moderate [ ]Severe  Constipation:                    [ ]Mild [ ]Moderate [ ]Severe    PAINAD Score: 0    [email or web address] (Ctrl +  left click to view)  		  Other Symptoms:  [ ]All other review of systems negative     Palliative Performance Status Version 2:  40%         [email or web address]  PHYSICAL EXAM:  Vital Signs Last 24 Hrs  T(C): 36.9 (22 Feb 2022 07:58), Max: 36.9 (22 Feb 2022 07:58)  T(F): 98.4 (22 Feb 2022 07:58), Max: 98.4 (22 Feb 2022 07:58)  HR: 88 (22 Feb 2022 12:05) (78 - 88)  BP: 144/69 (22 Feb 2022 07:58) (144/69 - 149/81)  BP(mean): --  RR: 18 (22 Feb 2022 07:58) (18 - 18)  SpO2: 98% (22 Feb 2022 12:05) (94% - 100%) I&O's Summary  GENERAL:  [x]Alert  [ ]Oriented x   [ ]Lethargic  [ ]Cachexia  [ ]Unarousable  [x]Verbal  [ ]Non-Verbal  Behavioral:   [x] Anxiety  [ ] Delirium [ ] Agitation [ ] Other  HEENT:  [ ]Normal   [ ]Dry mouth   [ ]ET Tube/Trach  [ ]Oral lesions  PULMONARY:   [x]Clear [ ]Tachypnea  [ ]Audible excessive secretions   [ ]Rhonchi        [ ]Right [ ]Left [ ]Bilateral  [ ]Crackles        [ ]Right [ ]Left [ ]Bilateral  [ ]Wheezing     [ ]Right [ ]Left [ ]Bilateral  [ ]Diminished BS [ ]Right [ ]Left [ ]Bilateral    CARDIOVASCULAR:    [x]Regular [ ]Irregular [ ]Tachy  [ ]Chao [ ]Murmur [ ]Other  GASTROINTESTINAL:  [ ]Soft  [x]Distended   [ ]+BS  [ ]Non tender [ ]Tender  [ ]PEG [ ]OGT/ NGT   Last BM:     GENITOURINARY:  [ ]Normal [ ] Incontinent   [ ]Oliguria/Anuria   [ ]Lopez  MUSCULOSKELETAL:   [ ]Normal   [x]Weakness  [ ]Bed/Wheelchair bound [ ]Edema  NEUROLOGIC:   [x]No focal deficits  [ ] Cognitive impairment  [ ] Dysphagia [ ]Dysarthria [ ] Paresis [ ]Other   SKIN:   [ ]Normal  [ ]Rash     [ ]Pressure ulcer(s)  [ ]y [ ]n  Present on admission      CRITICAL CARE:  [ ] Shock Present  [ ]Septic [ ]Cardiogenic [ ]Neurologic [ ]Hypovolemic  [ ]  Vasopressors [ ]  Inotropes   [ ] Respiratory failure present [ ] Mechanical Ventilation [ ] Non-invasive ventilatory support [ ] High-Flow  [ ] Acute  [ ] Chronic [ ] Hypoxic  [ ] Hypercarbic [ ] Other  [ ] Other organ failure     LABS:            RADIOLOGY & ADDITIONAL STUDIES:    PROTEIN CALORIE MALNUTRITION: [ ] mild [ ] moderate [ ] severe  [ ] underweight [ ] morbid obesity    https://www.andeal.org/vault/2440/web/files/ONC/Table_Clinical%20Characteristics%20to%20Document%20Malnutrition-White%20JV%20et%20al%749926.pdf    Height (cm): 162.6 (02-10-22 @ 15:21)  Weight (kg): 49.9 (02-10-22 @ 15:21)  BMI (kg/m2): 18.9 (02-10-22 @ 15:21)    [ ] PPSV2 < or = 30% [ ] significant weight loss [ ] poor nutritional intake [ ] anasarca   Artificial Nutrition [ ]     REFERRALS:   [ ]Chaplaincy  [ ] Hospice  [ ]Child Life  [ ]Social Work  [ ]Case management [ ]Holistic Therapy [ ] Physical Therapy [ ] Dietary   Goals of Care Document:    GAP TEAM PALLIATIVE CARE UNIT PROGRESS NOTE:      [  ] Patient on hospice program.    INDICATION FOR PALLIATIVE CARE UNIT SERVICES:  Complex discharge planning in the setting of advanced age and multiple -comorbidities.    INTERVAL HPI/OVERNIGHT EVENTS:  Pt seen and examined at bedside. States pain is well controlled able to tolerate oral food and has been ambulating with physical therapy.    DNR on chart:   Allergies    No Known Allergies    Intolerances    MEDICATIONS  (STANDING):  apixaban 2.5 milliGRAM(s) Oral every 12 hours  citalopram 10 milliGRAM(s) Oral <User Schedule>  famotidine   Suspension 20 milliGRAM(s) Oral daily  metoprolol succinate  milliGRAM(s) Oral <User Schedule>  nystatin Ointment 1 Application(s) Topical two times a day  petrolatum white Ointment 1 Application(s) Topical daily  zinc oxide 40% Ointment 1 Application(s) Topical two times a day    MEDICATIONS  (PRN):  acetaminophen    Suspension .. 650 milliGRAM(s) Oral every 6 hours PRN Temp greater or equal to 38C (100.4F), Mild Pain (1 - 3)  aluminum hydroxide/magnesium hydroxide/simethicone Suspension 30 milliLiter(s) Oral every 4 hours PRN Dyspepsia  Biotene Dry Mouth Oral Rinse 15 milliLiter(s) Swish and Spit every 6 hours PRN Mouth Care, Dry Mouth  bisacodyl Suppository 10 milliGRAM(s) Rectal daily PRN Constipation  LORazepam     Tablet 0.25 milliGRAM(s) Oral every 8 hours PRN intractable anxiety/agitation  ondansetron Injectable 4 milliGRAM(s) IV Push every 8 hours PRN Nausea and/or Vomiting  polyethylene glycol 3350 17 Gram(s) Oral daily PRN Constipation    ITEMS UNCHECKED ARE NOT PRESENT    PRESENT SYMPTOMS: [ ]Unable to obtain due to poor mentation   Source if other than patient:  [ ]Family   [ ]Team     Pain: [ ] yes [x] no  QOL impact -   Location -                    Aggravating factors -  Quality -  Radiation -  Timing-  Severity (0-10 scale):  Minimal acceptable level (0-10 scale):     Dyspnea:                           [ ]Mild [ ]Moderate [ ]Severe  Anxiety:                             [ ]Mild [ ]Moderate [ ]Severe  Fatigue:                             [ ]Mild [ ]Moderate [ ]Severe  Nausea:                             [ ]Mild [ ]Moderate [ ]Severe  Loss of appetite:              [ ]Mild [ ]Moderate [ ]Severe  Constipation:                    [ ]Mild [ ]Moderate [ ]Severe    PAINAD Score:     [email or web address] (Ctrl +  left click to view)  		  Other Symptoms:  [ ]All other review of systems negative     Palliative Performance Status Version 2:  40%         [email or web address]  PHYSICAL EXAM:  Vital Signs Last 24 Hrs  T(C): 36.9 (22 Feb 2022 07:58), Max: 36.9 (22 Feb 2022 07:58)  T(F): 98.4 (22 Feb 2022 07:58), Max: 98.4 (22 Feb 2022 07:58)  HR: 88 (22 Feb 2022 12:05) (78 - 88)  BP: 144/69 (22 Feb 2022 07:58) (144/69 - 149/81)  BP(mean): --  RR: 18 (22 Feb 2022 07:58) (18 - 18)  SpO2: 98% (22 Feb 2022 12:05) (94% - 100%) I&O's Summary  GENERAL:  [x]Alert  [ ]Oriented x   [ ]Lethargic  [ ]Cachexia  [ ]Unarousable  [x]Verbal  [ ]Non-Verbal  Behavioral:   [x] Anxiety  [ ] Delirium [ ] Agitation [ ] Other  HEENT:  [x ]Normal   [ ]Dry mouth   [ ]ET Tube/Trach  [ ]Oral lesions  PULMONARY:   [x]Clear [ ]Tachypnea  [ ]Audible excessive secretions   [ ]Rhonchi        [ ]Right [ ]Left [ ]Bilateral  [ ]Crackles        [ ]Right [ ]Left [ ]Bilateral  [ ]Wheezing     [ ]Right [ ]Left [ ]Bilateral  [ ]Diminished BS [ ]Right [ ]Left [ ]Bilateral    CARDIOVASCULAR:    [x]Regular [ ]Irregular [ ]Tachy  [ ]Chao [ ]Murmur [ ]Other  GASTROINTESTINAL:  [ ]Soft  [x]Distended   [x ]+BS  [ x]Non tender [ ]Tender  [ ]PEG [ ]OGT/ NGT   Last BM:   2/22/2022  GENITOURINARY:  [x ]Normal [ ] Incontinent   [ ]Oliguria/Anuria   [ ]Lopez  MUSCULOSKELETAL:   [ ]Normal   [x]Weakness  [ ]Bed/Wheelchair bound [ ]Edema  NEUROLOGIC:   [x]No focal deficits  [ ] Cognitive impairment  [ ] Dysphagia [ ]Dysarthria [ ] Paresis [ ]Other   SKIN:   [x ]Normal  [ ]Rash     [ ]Pressure ulcer(s)  [ ]y [ ]n  Present on admission      CRITICAL CARE:  [ ] Shock Present  [ ]Septic [ ]Cardiogenic [ ]Neurologic [ ]Hypovolemic  [ ]  Vasopressors [ ]  Inotropes   [ ] Respiratory failure present [ ] Mechanical Ventilation [ ] Non-invasive ventilatory support [ ] High-Flow  [ ] Acute  [ ] Chronic [ ] Hypoxic  [ ] Hypercarbic [ ] Other  [ ] Other organ failure     LABS: none new            RADIOLOGY & ADDITIONAL STUDIES: none new    PROTEIN CALORIE MALNUTRITION: [ ] mild [ ] moderate [ ] severe  [ ] underweight [ ] morbid obesity    https://www.andeal.org/vault/2440/web/files/ONC/Table_Clinical%20Characteristics%20to%20Document%20Malnutrition-White%20JV%20et%20al%155198.pdf    Height (cm): 162.6 (02-10-22 @ 15:21)  Weight (kg): 49.9 (02-10-22 @ 15:21)  BMI (kg/m2): 18.9 (02-10-22 @ 15:21)    [ ] PPSV2 < or = 30% [ ] significant weight loss [ ] poor nutritional intake [ ] anasarca   Artificial Nutrition [ ]     REFERRALS:   [ ]Chaplaincy  [ ] Hospice  [ ]Child Life  [x ]Social Work  [ ]Case management [ ]Holistic Therapy [ ] Physical Therapy [ ] Dietary   Goals of Care Document:

## 2022-02-22 NOTE — PROGRESS NOTE ADULT - PROBLEM SELECTOR PLAN 2
Family reports that patient took a small amount of xanax when she would get anxious in the past. No controlled substance history seen on istop Reference #: [id number]. Discussed option of trialing ativan here but potential side effects including sedation, confusion, and lightheadedness/dizziness. Patient was much more in favor of not taking benzos out of concern for potential adverse effects. Patient also likes to avoid taking medications in general if she can. Family notes that patient is very sensitive to psychoactive medications. Patient not complaining of active anxiety at this time. Family notes that patient is not very forward with staff regarding any complaints she may have. Discussed with son Yosvany and daughter Karina at bedside today having ativan 0.25mg tabs available on an as needed basis if patient is in agreement with taking it at that time. Continue home citalopram. Family reports that patient took a small amount of xanax when she would get anxious in the past. No controlled substance history seen on istop Reference #: [id number].   - Ativan 0.25 mg po q 8 hrs prn anxiety  - c/w home dose of citalopram

## 2022-02-22 NOTE — PROGRESS NOTE ADULT - ASSESSMENT
89F PMH Afib on Eliquis,s/p PPM, CKD, diastolic CHF, R.MCA stroke  s/p thrombectomy p/w progressive decline at home. Admitted for FTT and evaluation for hospice placement.   89F PMH Afib on Eliquis,s/p PPM, CKD, diastolic CHF, R.MCA stroke  s/p thrombectomy p/w progressive decline at home. Admitted for FTT and evaluation for hospice placement.

## 2022-02-22 NOTE — PATIENT PROFILE ADULT - FALL HARM RISK - HARM RISK INTERVENTIONS

## 2022-02-23 DIAGNOSIS — R13.10 DYSPHAGIA, UNSPECIFIED: ICD-10-CM

## 2022-02-23 DIAGNOSIS — Z02.9 ENCOUNTER FOR ADMINISTRATIVE EXAMINATIONS, UNSPECIFIED: ICD-10-CM

## 2022-02-23 LAB — SARS-COV-2 RNA SPEC QL NAA+PROBE: SIGNIFICANT CHANGE UP

## 2022-02-23 PROCEDURE — 99232 SBSQ HOSP IP/OBS MODERATE 35: CPT

## 2022-02-23 RX ADMIN — APIXABAN 2.5 MILLIGRAM(S): 2.5 TABLET, FILM COATED ORAL at 22:13

## 2022-02-23 RX ADMIN — Medication 100 MILLIGRAM(S): at 22:12

## 2022-02-23 RX ADMIN — NYSTATIN CREAM 1 APPLICATION(S): 100000 CREAM TOPICAL at 05:29

## 2022-02-23 RX ADMIN — FAMOTIDINE 20 MILLIGRAM(S): 10 INJECTION INTRAVENOUS at 11:11

## 2022-02-23 RX ADMIN — APIXABAN 2.5 MILLIGRAM(S): 2.5 TABLET, FILM COATED ORAL at 10:33

## 2022-02-23 RX ADMIN — Medication 1 APPLICATION(S): at 11:13

## 2022-02-23 RX ADMIN — ZINC OXIDE 1 APPLICATION(S): 200 OINTMENT TOPICAL at 17:14

## 2022-02-23 RX ADMIN — CITALOPRAM 10 MILLIGRAM(S): 10 TABLET, FILM COATED ORAL at 22:13

## 2022-02-23 RX ADMIN — NYSTATIN CREAM 1 APPLICATION(S): 100000 CREAM TOPICAL at 17:15

## 2022-02-23 RX ADMIN — ZINC OXIDE 1 APPLICATION(S): 200 OINTMENT TOPICAL at 05:29

## 2022-02-23 RX ADMIN — Medication 100 MILLIGRAM(S): at 10:33

## 2022-02-23 RX ADMIN — Medication 0.25 MILLIGRAM(S): at 22:12

## 2022-02-23 NOTE — PROGRESS NOTE ADULT - CONVERSATION DETAILS
I discussed the principles of hospice care including but not limited to overarching principles of treatment, candidacy, venues where hospice care is carried out, inpatient hospice eligibility, expectations surrounding rehospitalization, aide availability (time), and insurance benefits, and symptom burden focus.    ACP > 30 min
Discussed with patient, her granddaughter, and son the necessity regarding a repeat Speech and Swallow evaluation. Family expressed concern that patient did not like thickened liquids and would prefer a more liberal diet compared to the one that was previously recommended by Speech and Swallow.

## 2022-02-23 NOTE — PROGRESS NOTE ADULT - PROBLEM SELECTOR PLAN 6
Continue symptom management in the pcu. Medically stable for discharge to [facility]. DNR/DNI

## 2022-02-23 NOTE — PROGRESS NOTE ADULT - PROBLEM SELECTOR PLAN 3
CKD Stage 4  Judicious dosing of opioids Family reports that patient took a small amount of xanax when she would get anxious in the past. No controlled substance history seen on istop Reference #: [id number].   - Ativan 0.25 mg po q 8 hrs prn anxiety  - c/w home dose of citalopram

## 2022-02-23 NOTE — PROGRESS NOTE ADULT - SUBJECTIVE AND OBJECTIVE BOX
GAP TEAM PALLIATIVE CARE UNIT PROGRESS NOTE:      [  ] Patient on hospice program.    INDICATION FOR PALLIATIVE CARE UNIT SERVICES:    Hospice Evaluation    INTERVAL HPI/OVERNIGHT EVENTS:    Pt seen at bedside and appears comfortable. Stating that she has been doing well and able to tolerate her diet. Denies any pain/nausea/vomitting.    DNR on chart:   Allergies    No Known Allergies    Intolerances    MEDICATIONS  (STANDING):  apixaban 2.5 milliGRAM(s) Oral every 12 hours  citalopram 10 milliGRAM(s) Oral <User Schedule>  famotidine   Suspension 20 milliGRAM(s) Oral daily  metoprolol succinate  milliGRAM(s) Oral <User Schedule>  nystatin Ointment 1 Application(s) Topical two times a day  petrolatum white Ointment 1 Application(s) Topical daily  zinc oxide 40% Ointment 1 Application(s) Topical two times a day    MEDICATIONS  (PRN):  acetaminophen    Suspension .. 650 milliGRAM(s) Oral every 6 hours PRN Temp greater or equal to 38C (100.4F), Mild Pain (1 - 3)  aluminum hydroxide/magnesium hydroxide/simethicone Suspension 30 milliLiter(s) Oral every 4 hours PRN Dyspepsia  Biotene Dry Mouth Oral Rinse 15 milliLiter(s) Swish and Spit every 6 hours PRN Mouth Care, Dry Mouth  bisacodyl Suppository 10 milliGRAM(s) Rectal daily PRN Constipation  LORazepam     Tablet 0.25 milliGRAM(s) Oral every 8 hours PRN intractable anxiety/agitation  ondansetron Injectable 4 milliGRAM(s) IV Push every 8 hours PRN Nausea and/or Vomiting  polyethylene glycol 3350 17 Gram(s) Oral daily PRN Constipation    ITEMS UNCHECKED ARE NOT PRESENT    PRESENT SYMPTOMS: [ ]Unable to obtain due to poor mentation   Source if other than patient:  [ ]Family   [ ]Team     Pain: [ ] yes [x] no  QOL impact -   Location -                    Aggravating factors -  Quality -  Radiation -  Timing-  Severity (0-10 scale):  Minimal acceptable level (0-10 scale):     Dyspnea:                           [ ]Mild [ ]Moderate [ ]Severe  Anxiety:                             [ ]Mild [ ]Moderate [ ]Severe  Fatigue:                             [ ]Mild [ ]Moderate [ ]Severe  Nausea:                             [ ]Mild [ ]Moderate [ ]Severe  Loss of appetite:              [ ]Mild [ ]Moderate [ ]Severe  Constipation:                    [ ]Mild [ ]Moderate [ ]Severe    PAINAD Score: 0    [email or web address] (Ctrl +  left click to view)  		  Other Symptoms:  [x]All other review of systems negative     Palliative Performance Status Version 2:    40%         [email or web address]  PHYSICAL EXAM:  Vital Signs Last 24 Hrs  T(C): 36.9 (23 Feb 2022 09:09), Max: 36.9 (23 Feb 2022 09:09)  T(F): 98.4 (23 Feb 2022 09:09), Max: 98.4 (23 Feb 2022 09:09)  HR: 74 (23 Feb 2022 09:09) (74 - 88)  BP: 160/69 (23 Feb 2022 09:09) (156/68 - 160/69)  BP(mean): --  RR: 18 (22 Feb 2022 21:55) (18 - 18)  SpO2: 98% (22 Feb 2022 21:55) (98% - 98%) I&O's Summary  GENERAL:  [x]Alert  [ ]Oriented x   [ ]Lethargic  [ ]Cachexia  [ ]Unarousable  [x]Verbal  [ ]Non-Verbal  Behavioral:   [x] Anxiety  [ ] Delirium [ ] Agitation [ ] Other  HEENT:  [x]Normal   [ ]Dry mouth   [ ]ET Tube/Trach  [ ]Oral lesions  PULMONARY:   [x]Clear [ ]Tachypnea  [ ]Audible excessive secretions   [ ]Rhonchi        [ ]Right [ ]Left [ ]Bilateral  [ ]Crackles        [ ]Right [ ]Left [ ]Bilateral  [ ]Wheezing     [ ]Right [ ]Left [ ]Bilateral  [ ]Diminished BS [ ]Right [ ]Left [ ]Bilateral    CARDIOVASCULAR:    [x]Regular [ ]Irregular [ ]Tachy  [ ]Chao [ ]Murmur [ ]Other  GASTROINTESTINAL:  [x]Soft  [ ]Distended   [ ]+BS  [ ]Non tender [ ]Tender  [ ]PEG [ ]OGT/ NGT   Last BM:     GENITOURINARY:  [ ]Normal [ ] Incontinent   [ ]Oliguria/Anuria   [ ]Lopez  MUSCULOSKELETAL:   [ ]Normal   [x]Weakness  [ ]Bed/Wheelchair bound [ ]Edema  NEUROLOGIC:   [x]No focal deficits  [ ] Cognitive impairment  [ ] Dysphagia [ ]Dysarthria [ ] Paresis [ ]Other   SKIN:   [ ]Normal  [ ]Rash     [ ]Pressure ulcer(s)  [ ]y [ ]n  Present on admission      CRITICAL CARE:  [ ] Shock Present  [ ]Septic [ ]Cardiogenic [ ]Neurologic [ ]Hypovolemic  [ ]  Vasopressors [ ]  Inotropes   [ ] Respiratory failure present [ ] Mechanical Ventilation [ ] Non-invasive ventilatory support [ ] High-Flow  [ ] Acute  [ ] Chronic [ ] Hypoxic  [ ] Hypercarbic [ ] Other  [ ] Other organ failure     LABS:            RADIOLOGY & ADDITIONAL STUDIES:    PROTEIN CALORIE MALNUTRITION: [ ] mild [ ] moderate [ ] severe  [ ] underweight [ ] morbid obesity    https://www.andeal.org/vault/2440/web/files/ONC/Table_Clinical%20Characteristics%20to%20Document%20Malnutrition-White%20JV%20et%20al%598863.pdf    Height (cm): 162.6 (02-10-22 @ 15:21)  Weight (kg): 49.9 (02-10-22 @ 15:21)  BMI (kg/m2): 18.9 (02-10-22 @ 15:21)    [ ] PPSV2 < or = 30% [ ] significant weight loss [ ] poor nutritional intake [ ] anasarca   Artificial Nutrition [ ]     REFERRALS:   [ ]Chaplaincy  [x] Hospice  [ ]Child Life  [x]Social Work  [ ]Case management [ ]Holistic Therapy [ ] Physical Therapy [ ] Dietary   Goals of Care Document:    GAP TEAM PALLIATIVE CARE UNIT PROGRESS NOTE:      [  ] Patient on hospice program.    INDICATION FOR PALLIATIVE CARE UNIT SERVICES:    Complex discharge planning in the setting of advanced age and multiple medical comorbidities    INTERVAL HPI/OVERNIGHT EVENTS:    Pt seen at bedside and appears comfortable. Stating that she has been doing well and able to tolerate her diet. Denies any pain/nausea/vomitting.    DNR on chart:   Allergies    No Known Allergies    Intolerances    MEDICATIONS  (STANDING):  apixaban 2.5 milliGRAM(s) Oral every 12 hours  citalopram 10 milliGRAM(s) Oral <User Schedule>  famotidine   Suspension 20 milliGRAM(s) Oral daily  metoprolol succinate  milliGRAM(s) Oral <User Schedule>  nystatin Ointment 1 Application(s) Topical two times a day  petrolatum white Ointment 1 Application(s) Topical daily  zinc oxide 40% Ointment 1 Application(s) Topical two times a day    MEDICATIONS  (PRN):  acetaminophen    Suspension .. 650 milliGRAM(s) Oral every 6 hours PRN Temp greater or equal to 38C (100.4F), Mild Pain (1 - 3)  aluminum hydroxide/magnesium hydroxide/simethicone Suspension 30 milliLiter(s) Oral every 4 hours PRN Dyspepsia  Biotene Dry Mouth Oral Rinse 15 milliLiter(s) Swish and Spit every 6 hours PRN Mouth Care, Dry Mouth  bisacodyl Suppository 10 milliGRAM(s) Rectal daily PRN Constipation  LORazepam     Tablet 0.25 milliGRAM(s) Oral every 8 hours PRN intractable anxiety/agitation  ondansetron Injectable 4 milliGRAM(s) IV Push every 8 hours PRN Nausea and/or Vomiting  polyethylene glycol 3350 17 Gram(s) Oral daily PRN Constipation    ITEMS UNCHECKED ARE NOT PRESENT    PRESENT SYMPTOMS: [ ]Unable to obtain due to poor mentation   Source if other than patient:  [ ]Family   [ ]Team     Pain: [ ] yes [x] no  QOL impact -   Location -                    Aggravating factors -  Quality -  Radiation -  Timing-  Severity (0-10 scale):  Minimal acceptable level (0-10 scale):     Dyspnea:                           [ ]Mild [ ]Moderate [ ]Severe  Anxiety:                             [ ]Mild [ ]Moderate [ ]Severe  Fatigue:                             [ ]Mild [ ]Moderate [ ]Severe  Nausea:                             [ ]Mild [ ]Moderate [ ]Severe  Loss of appetite:              [ ]Mild [ ]Moderate [ ]Severe  Constipation:                    [ ]Mild [ ]Moderate [ ]Severe    PAINAD Score:    [email or web address] (Ctrl +  left click to view)  		  Other Symptoms:  [x]All other review of systems negative     Palliative Performance Status Version 2:    40%         [email or web address]  PHYSICAL EXAM:  Vital Signs Last 24 Hrs  T(C): 36.9 (23 Feb 2022 09:09), Max: 36.9 (23 Feb 2022 09:09)  T(F): 98.4 (23 Feb 2022 09:09), Max: 98.4 (23 Feb 2022 09:09)  HR: 74 (23 Feb 2022 09:09) (74 - 88)  BP: 160/69 (23 Feb 2022 09:09) (156/68 - 160/69)  BP(mean): --  RR: 18 (22 Feb 2022 21:55) (18 - 18)  SpO2: 98% (22 Feb 2022 21:55) (98% - 98%) I&O's Summary  GENERAL:  [x]Alert  [ ]Oriented x   [ ]Lethargic  [ ]Cachexia  [ ]Unarousable  [x]Verbal  [ ]Non-Verbal  Behavioral:   [ ] Anxiety  [ ] Delirium [ ] Agitation [ ] Other  HEENT:  [x]Normal   [ ]Dry mouth   [ ]ET Tube/Trach  [ ]Oral lesions  PULMONARY:   [x]Clear [ ]Tachypnea  [ ]Audible excessive secretions   [ ]Rhonchi        [ ]Right [ ]Left [ ]Bilateral  [ ]Crackles        [ ]Right [ ]Left [ ]Bilateral  [ ]Wheezing     [ ]Right [ ]Left [ ]Bilateral  [ ]Diminished BS [ ]Right [ ]Left [ ]Bilateral    CARDIOVASCULAR:    [x]Regular [ ]Irregular [ ]Tachy  [ ]Chao [ ]Murmur [ ]Other  GASTROINTESTINAL:  [x]Soft  [ ]Distended   [ ]+BS  [ ]Non tender [ ]Tender  [ ]PEG [ ]OGT/ NGT   Last BM:     GENITOURINARY:  [x]Normal [ ] Incontinent   [ ]Oliguria/Anuria   [ ]Lopez  MUSCULOSKELETAL:   [ ]Normal   [x]Weakness  [ ]Bed/Wheelchair bound [ ]Edema  NEUROLOGIC:   [x]No focal deficits  [ ] Cognitive impairment  [ ] Dysphagia [ ]Dysarthria [ ] Paresis [ ]Other   SKIN:   [x]Normal  [ ]Rash     [ ]Pressure ulcer(s)  [ ]y [ ]n  Present on admission      CRITICAL CARE:  [ ] Shock Present  [ ]Septic [ ]Cardiogenic [ ]Neurologic [ ]Hypovolemic  [ ]  Vasopressors [ ]  Inotropes   [ ] Respiratory failure present [ ] Mechanical Ventilation [ ] Non-invasive ventilatory support [ ] High-Flow  [ ] Acute  [ ] Chronic [ ] Hypoxic  [ ] Hypercarbic [ ] Other  [ ] Other organ failure     LABS:            RADIOLOGY & ADDITIONAL STUDIES:    PROTEIN CALORIE MALNUTRITION: [ ] mild [ ] moderate [ ] severe  [ ] underweight [ ] morbid obesity    https://www.andeal.org/vault/2440/web/files/ONC/Table_Clinical%20Characteristics%20to%20Document%20Malnutrition-White%20JV%20et%20al%149673.pdf    Height (cm): 162.6 (02-10-22 @ 15:21)  Weight (kg): 49.9 (02-10-22 @ 15:21)  BMI (kg/m2): 18.9 (02-10-22 @ 15:21)    [ ] PPSV2 < or = 30% [ ] significant weight loss [ ] poor nutritional intake [ ] anasarca   Artificial Nutrition [ ]     REFERRALS:   [ ]Chaplaincy  [x] Hospice  [ ]Child Life  [x]Social Work  [ ]Case management [ ]Holistic Therapy [x] Physical Therapy [ ] Dietary   Goals of Care Document:

## 2022-02-23 NOTE — PROGRESS NOTE ADULT - ATTENDING COMMENTS
HPI:  89F PMH Afib on Eliquis,s/p PPM, CKD, diastolic CHF, CVA s/p thrombectomy p/w progressive decline at home. Also with c/o SOB, SHIN, generalized weakness and dec PO intake. Family who have been taking care of pt at home have noticed a gradual decline over the past 3-weeks. Also report that the pt has been very dyspneic and anxious at night time. She recently started using 1-2L  NC  which has increased to 6L. Per son her O2 saturations have been as low as low-80s. She has taken lasix with  no relief. Family states she has to sleep propped up with several pillows.  She has also takes Ativan prn for anxiety. She has also had poor appetite and has been fed mostly soft items like yoghurt. No reports of dec UO or changes in BM. Of note, she had been evaluated by hospice for [facility]ospice by [facility] ~ 1-week ago but was deemed ineligible at the time. Family seeking for comfort measures only and hospice placement    ED course: Afebrile. HDS. Saturating well on 3L NC     (10 Feb 2022 21:04)    The patient was seen and examined  Annotations are embedded above  Predominant symptom(s):  #dyspnea-resolved  Well controlled on NC  Relevant factors:  # anorexia-doing better  # Debility DNR  # Palliative-revisited case with the Son,  [place] of family meeting within siblings this weekend.  Will follow up on Tuesday.                Yossi Kerr MD   of Geriatric and Palliative Medicine  [facility]    Between the hours of 9 am and 5 pm from Monday through Friday, please contact me on [employer] Teams    After 5pm and on weekends, please see the contact information below:    In the event of newly developing, evolving, or worsening symptoms, please contact the Palliative Medicine team via pager (if the patient is at [facility] #[phone] or if the patient is at [facility] #[phone]) The Geriatric and Palliative Medicine service has coverage 24 hours a day/ 7 days a week to provide medical recommendations regarding symptom management needs via telephone
HPI:  89F PMH Afib on Eliquis,s/p PPM, CKD, diastolic CHF, CVA s/p thrombectomy p/w progressive decline at home. Also with c/o SOB, SHIN, generalized weakness and dec PO intake. Family who have been taking care of pt at home have noticed a gradual decline over the past 3-weeks. Also report that the pt has been very dyspneic and anxious at night time. She recently started using 1-2L  NC  which has increased to 6L. Per son her O2 saturations have been as low as low-80s. She has taken lasix with  no relief. Family states she has to sleep propped up with several pillows.  She has also takes Ativan prn for anxiety. She has also had poor appetite and has been fed mostly soft items like yoghurt. No reports of dec UO or changes in BM. Of note, she had been evaluated by hospice for Hospice by [facility] ~ 1-week ago but was deemed ineligible at the time. Family seeking for comfort measures only and hospice placement    ED course: Afebrile. HDS. Saturating well on 3L NC     (10 Feb 2022 21:04)    The patient was seen and examined  Annotations are embedded above  Predominant symptom(s):  #dyspnea-significant improvement  Relevant factors:  # anorexia-consider dronabinol trial  # Debility DNR  # Palliative Link to [facility] note  Extensive discussion surrounding hospice, detail and guidance provided          In the event of newly developing, evolving, or worsening symptoms, please contact the Palliative Medicine team via pager (if the patient is at [facility] #[phone] or if the patient is at [facility] #[phone]) The Geriatric and Palliative Medicine service has coverage 24 hours a day/ 7 days a week to provide medical recommendations regarding symptom management needs via telephone      Yossi Kerr MD   of Geriatric and Palliative Medicine  [facility]    Between the hours of 9 am and 5 pm from Monday through Friday, please contact me on [employer] Teams    After 5pm and on weekends, please see the contact information below:    In the event of newly developing, evolving, or worsening symptoms, please contact the Palliative Medicine team via pager (if the patient is at [facility] #[phone] or if the patient is at [facility] #[phone]) The Geriatric and Palliative Medicine service has coverage 24 hours a day/ 7 days a week to provide medical recommendations regarding symptom management needs via telephone
89F PMH Afib on Eliquis,s/p PPM, CKD, diastolic CHF, R.MCA stroke  s/p thrombectomy p/w progressive decline at home. Admitted for FTT and evaluation for hospice placement.      -dc non essential medications  -tolerating soln meds   - patient is on celexa but having anxiety (acute) in form of worrying about medical condition. Recommend ativan 0.2 q4-6hrs prn for acute panic attacks in addition to celexa.  Family/pt refusing. Will suggest po if amenable for panic/worrying attacks.   - pending family decision for hospice at home
89F PMH Afib on Eliquis,s/p PPM, CKD, diastolic CHF, R.MCA stroke  s/p thrombectomy p/w progressive decline at home. Admitted for FTT and evaluation for hospice placement.      -Will dc non essential medications  - switch ppi to po soln famotidine   - pending family decision for hospice
89F PMH Afib on Eliquis,s/p PPM, CKD, diastolic CHF, R.MCA stroke  s/p thrombectomy p/w progressive decline at home. Admitted for FTT and evaluation for hospice placement.      -Will dc non essential medications  -tolerating soln meds   - patient is on celexa but having anxiety (acute) in form of worrying about medical condition. Recommend ativan 0.2 q4-6hrs prn for acute panic attacks in addition to celexa.    - pending family decision for hospice at home
89F PMH Afib on Eliquis,s/p PPM, CKD, diastolic CHF, R.MCA stroke  s/p thrombectomy p/w progressive decline at home. Admitted for FTT and evaluation for hospice placement. Patient does not meet criteria for inpatient hospice services.  Family unwilling to take patient home at this time.  PT evaluation for [facility], determined she is eligible.  Anticipate discharge disposition issues.  Social work involved.  Patient assessment and plan discussed on interdisciplinary team rounds today.
89F PMH Afib on Eliquis,s/p PPM, CKD, diastolic CHF, R.MCA stroke  s/p thrombectomy p/w progressive decline at home. Admitted for FTT and evaluation for hospice, eligible for  home, family unable to care for there at this time.  PT seen and ok for [facility].  Remains with discharge dispo post [facility].  Patient assessment and plan discussed on interdisciplinary team rounds today.  No need for repeat S and S.

## 2022-02-23 NOTE — PROGRESS NOTE ADULT - PROBLEM SELECTOR PLAN 2
Family reports that patient took a small amount of xanax when she would get anxious in the past. No controlled substance history seen on [facility] Reference #: [id number].   - Ativan 0.25 mg po q 8 hrs prn anxiety  - c/w home dose of citalopram Pt requesting liberalization of diet to include thinned liquids  - Liberalized diet to Soft with regular liquids  - Family and patient counseled on risk of aspiration and methods to prevent aspriation Pt requesting liberalization of diet to include thinned liquids  - Liberalized diet to Soft with regular liquids  - Family and patient counseled on risk of aspiration and methods to prevent aspiration

## 2022-02-24 ENCOUNTER — TRANSCRIPTION ENCOUNTER (OUTPATIENT)
Age: 87
End: 2022-02-24

## 2022-02-24 VITALS
RESPIRATION RATE: 18 BRPM | DIASTOLIC BLOOD PRESSURE: 80 MMHG | OXYGEN SATURATION: 96 % | SYSTOLIC BLOOD PRESSURE: 173 MMHG | TEMPERATURE: 98 F | HEART RATE: 79 BPM

## 2022-02-24 PROCEDURE — U0003: CPT

## 2022-02-24 PROCEDURE — 82962 GLUCOSE BLOOD TEST: CPT

## 2022-02-24 PROCEDURE — 82803 BLOOD GASES ANY COMBINATION: CPT

## 2022-02-24 PROCEDURE — 99285 EMERGENCY DEPT VISIT HI MDM: CPT | Mod: 25

## 2022-02-24 PROCEDURE — 97161 PT EVAL LOW COMPLEX 20 MIN: CPT

## 2022-02-24 PROCEDURE — 71045 X-RAY EXAM CHEST 1 VIEW: CPT

## 2022-02-24 PROCEDURE — U0005: CPT

## 2022-02-24 PROCEDURE — 36415 COLL VENOUS BLD VENIPUNCTURE: CPT

## 2022-02-24 PROCEDURE — 84100 ASSAY OF PHOSPHORUS: CPT

## 2022-02-24 PROCEDURE — 85014 HEMATOCRIT: CPT

## 2022-02-24 PROCEDURE — 84295 ASSAY OF SERUM SODIUM: CPT

## 2022-02-24 PROCEDURE — 99232 SBSQ HOSP IP/OBS MODERATE 35: CPT

## 2022-02-24 PROCEDURE — 82947 ASSAY GLUCOSE BLOOD QUANT: CPT

## 2022-02-24 PROCEDURE — 80053 COMPREHEN METABOLIC PANEL: CPT

## 2022-02-24 PROCEDURE — 83605 ASSAY OF LACTIC ACID: CPT

## 2022-02-24 PROCEDURE — 97116 GAIT TRAINING THERAPY: CPT

## 2022-02-24 PROCEDURE — 97110 THERAPEUTIC EXERCISES: CPT

## 2022-02-24 PROCEDURE — 85025 COMPLETE CBC W/AUTO DIFF WBC: CPT

## 2022-02-24 PROCEDURE — 83735 ASSAY OF MAGNESIUM: CPT

## 2022-02-24 PROCEDURE — 97530 THERAPEUTIC ACTIVITIES: CPT

## 2022-02-24 PROCEDURE — 92610 EVALUATE SWALLOWING FUNCTION: CPT

## 2022-02-24 PROCEDURE — 84132 ASSAY OF SERUM POTASSIUM: CPT

## 2022-02-24 PROCEDURE — 85027 COMPLETE CBC AUTOMATED: CPT

## 2022-02-24 PROCEDURE — 84443 ASSAY THYROID STIM HORMONE: CPT

## 2022-02-24 PROCEDURE — 82435 ASSAY OF BLOOD CHLORIDE: CPT

## 2022-02-24 PROCEDURE — 85018 HEMOGLOBIN: CPT

## 2022-02-24 PROCEDURE — 80048 BASIC METABOLIC PNL TOTAL CA: CPT

## 2022-02-24 PROCEDURE — 82330 ASSAY OF CALCIUM: CPT

## 2022-02-24 PROCEDURE — C8929: CPT

## 2022-02-24 PROCEDURE — 83880 ASSAY OF NATRIURETIC PEPTIDE: CPT

## 2022-02-24 RX ORDER — APIXABAN 2.5 MG/1
1 TABLET, FILM COATED ORAL
Qty: 0 | Refills: 0 | DISCHARGE
Start: 2022-02-24

## 2022-02-24 RX ORDER — METOPROLOL TARTRATE 50 MG
1 TABLET ORAL
Qty: 0 | Refills: 0 | DISCHARGE
Start: 2022-02-24

## 2022-02-24 RX ORDER — CITALOPRAM 10 MG/1
1 TABLET, FILM COATED ORAL
Qty: 0 | Refills: 0 | DISCHARGE
Start: 2022-02-24

## 2022-02-24 RX ORDER — APIXABAN 2.5 MG/1
1 TABLET, FILM COATED ORAL
Qty: 0 | Refills: 0 | DISCHARGE

## 2022-02-24 RX ORDER — ZINC OXIDE 200 MG/G
1 OINTMENT TOPICAL
Qty: 0 | Refills: 0 | DISCHARGE
Start: 2022-02-24

## 2022-02-24 RX ORDER — FUROSEMIDE 40 MG
0.5 TABLET ORAL
Qty: 0 | Refills: 0 | DISCHARGE

## 2022-02-24 RX ORDER — CITALOPRAM 10 MG/1
2 TABLET, FILM COATED ORAL
Qty: 0 | Refills: 0 | DISCHARGE
Start: 2022-02-24

## 2022-02-24 RX ORDER — CITALOPRAM 10 MG/1
1 TABLET, FILM COATED ORAL
Qty: 0 | Refills: 0 | DISCHARGE

## 2022-02-24 RX ORDER — CANDESARTAN CILEXETIL 8 MG/1
1 TABLET ORAL
Qty: 0 | Refills: 0 | DISCHARGE

## 2022-02-24 RX ORDER — METOPROLOL TARTRATE 50 MG
1 TABLET ORAL
Qty: 0 | Refills: 0 | DISCHARGE

## 2022-02-24 RX ORDER — ACETAMINOPHEN 500 MG
20.31 TABLET ORAL
Qty: 0 | Refills: 0 | DISCHARGE
Start: 2022-02-24

## 2022-02-24 RX ORDER — NYSTATIN CREAM 100000 [USP'U]/G
1 CREAM TOPICAL
Qty: 0 | Refills: 0 | DISCHARGE
Start: 2022-02-24

## 2022-02-24 RX ORDER — PETROLATUM,WHITE
1 JELLY (GRAM) TOPICAL
Qty: 0 | Refills: 0 | DISCHARGE
Start: 2022-02-24

## 2022-02-24 RX ORDER — SALIVA SUBSTITUTE COMB NO.11 351 MG
15 POWDER IN PACKET (EA) MUCOUS MEMBRANE
Qty: 0 | Refills: 0 | DISCHARGE
Start: 2022-02-24

## 2022-02-24 RX ORDER — POLYETHYLENE GLYCOL 3350 17 G/17G
17 POWDER, FOR SOLUTION ORAL
Qty: 0 | Refills: 0 | DISCHARGE
Start: 2022-02-24

## 2022-02-24 RX ADMIN — Medication 100 MILLIGRAM(S): at 10:28

## 2022-02-24 RX ADMIN — FAMOTIDINE 20 MILLIGRAM(S): 10 INJECTION INTRAVENOUS at 13:10

## 2022-02-24 RX ADMIN — Medication 1 APPLICATION(S): at 11:30

## 2022-02-24 RX ADMIN — NYSTATIN CREAM 1 APPLICATION(S): 100000 CREAM TOPICAL at 05:06

## 2022-02-24 RX ADMIN — APIXABAN 2.5 MILLIGRAM(S): 2.5 TABLET, FILM COATED ORAL at 10:28

## 2022-02-24 RX ADMIN — ZINC OXIDE 1 APPLICATION(S): 200 OINTMENT TOPICAL at 05:06

## 2022-02-24 NOTE — PROGRESS NOTE ADULT - NUTRITIONAL ASSESSMENT
This patient has been assessed with a concern for Malnutrition and has been determined to have a diagnosis/diagnoses of Severe protein-calorie malnutrition and Underweight (BMI < 19).    This patient is being managed with:   Diet Soft and Bite Sized-  Mildly Thick Liquids (MILDTHICKLIQS)  Supplement Feeding Modality:  Oral  Ensure Enlive Cans or Servings Per Day:  2       Frequency:  Daily  Entered: Feb 14 2022  3:09PM    
This patient has been assessed with a concern for Malnutrition and has been determined to have a diagnosis/diagnoses of Severe protein-calorie malnutrition and Underweight (BMI < 19).    This patient is being managed with:   Diet Soft and Bite Sized-  Mildly Thick Liquids (MILDTHICKLIQS)  Supplement Feeding Modality:  Oral  Ensure Enlive Cans or Servings Per Day:  2       Frequency:  Daily  Entered: Feb 14 2022  3:09PM    
This patient has been assessed with a concern for Malnutrition and has been determined to have a diagnosis/diagnoses of Severe protein-calorie malnutrition and Underweight (BMI < 19).    This patient is being managed with:   Diet Soft and Bite Sized-  Entered: Feb 23 2022 12:51PM

## 2022-02-24 NOTE — DISCHARGE NOTE NURSING/CASE MANAGEMENT/SOCIAL WORK - PATIENT PORTAL LINK FT
You can access the FollowMyHealth Patient Portal offered by [facility] by registering at the following website: [email or web address]. By joining [facility]’s FollowMyHealth portal, you will also be able to view your health information using other applications (apps) compatible with our system.

## 2022-02-24 NOTE — DISCHARGE NOTE PROVIDER - NSDCCPCAREPLAN_GEN_ALL_CORE_FT
PRINCIPAL DISCHARGE DIAGNOSIS  Diagnosis: Adult failure to thrive  Assessment and Plan of Treatment: You presented to the hospital for progressive decline at home, generalized weakness and decrease oral intake. Family who have been taking care of you at home have noticed a gradual decline over the past three weeks. You were seen by physical theraphy and they recommend subacute rehab

## 2022-02-24 NOTE — DISCHARGE NOTE PROVIDER - NSDCMRMEDTOKEN_GEN_ALL_CORE_FT
acetaminophen 160 mg/5 mL oral suspension: 20.31 milliliter(s) orally every 6 hours, As needed, Temp greater or equal to 38C (100.4F), Mild Pain (1 - 3)  aluminum hydroxide-magnesium hydroxide 200 mg-200 mg/5 mL oral suspension: 30 milliliter(s) orally every 4 hours, As needed, Dyspepsia  apixaban 2.5 mg oral tablet: 1 tab(s) orally every 12 hours  atorvastatin 40 mg oral tablet: 1 tab(s) orally once a day (at bedtime)  bisacodyl 10 mg rectal suppository: 1 suppository(ies) rectal once a day, As needed, Constipation  citalopram 10 mg oral tablet: 1 tab(s) orally once (at bedtime)  furosemide 20 mg oral tablet: 0.5 tab(s) orally once a day  LORazepam: 0.25 milligram(s) orally every 8 hours, As Needed  LORazepam: 0.25 milligram(s) orally once (at bedtime)  metoprolol succinate 100 mg oral tablet, extended release: 1 tab(s) orally 2 times a day  nystatin 100,000 units/g topical ointment: 1 application topically 2 times a day  omeprazole 20 mg oral delayed release capsule: 1 cap(s) orally once a day  petrolatum topical ointment: 1 application topically once a day  polyethylene glycol 3350 oral powder for reconstitution: 17 gram(s) orally once a day, As needed, Constipation  saliva substitutes oral solution: 15 milliliter(s) orally every 6 hours, As Needed  zinc oxide 40% topical ointment: 1 application topically 2 times a day   acetaminophen 160 mg/5 mL oral suspension: 20.31 milliliter(s) orally every 6 hours, As needed, Temp greater or equal to 38C (100.4F), Mild Pain (1 - 3)  aluminum hydroxide-magnesium hydroxide 200 mg-200 mg/5 mL oral suspension: 30 milliliter(s) orally every 4 hours, As needed, Dyspepsia  apixaban 2.5 mg oral tablet: 1 tab(s) orally every 12 hours  atorvastatin 40 mg oral tablet: 1 tab(s) orally once a day (at bedtime)  bisacodyl 10 mg rectal suppository: 1 suppository(ies) rectal once a day, As needed, Constipation  citalopram 10 mg oral tablet: 1 tab(s) orally once (at bedtime)  LORazepam: 0.25 milligram(s) orally every 8 hours, As Needed  LORazepam: 0.25 milligram(s) orally once (at bedtime)  metoprolol succinate 100 mg oral tablet, extended release: 1 tab(s) orally 2 times a day  nystatin 100,000 units/g topical ointment: 1 application topically 2 times a day  omeprazole 20 mg oral delayed release capsule: 1 cap(s) orally once a day  petrolatum topical ointment: 1 application topically once a day  polyethylene glycol 3350 oral powder for reconstitution: 17 gram(s) orally once a day, As needed, Constipation  saliva substitutes oral solution: 15 milliliter(s) orally every 6 hours, As Needed  zinc oxide 40% topical ointment: 1 application topically 2 times a day

## 2022-02-24 NOTE — PROGRESS NOTE ADULT - PROVIDER SPECIALTY LIST ADULT
Nephrology
Hospitalist
Hospitalist
Nephrology
Palliative Care
Hospitalist
Nephrology
Nephrology
Palliative Care
Hospitalist
Palliative Care
Palliative Care
Hospitalist
Palliative Care
Hospitalist
Palliative Care

## 2022-02-24 NOTE — PROGRESS NOTE ADULT - SUBJECTIVE AND OBJECTIVE BOX
GAP TEAM PALLIATIVE CARE UNIT PROGRESS NOTE:      [  ] Patient on hospice program.    INDICATION FOR PALLIATIVE CARE UNIT SERVICES:  Medical decision making and disposition planning in the setting of adult failure to thrive.    INTERVAL HPI/OVERNIGHT EVENTS: Patient remains comfortable, no PRN's used.  Working with PT.      DNR on chart: Yes  Yes      Allergies    No Known Allergies    Intolerances    MEDICATIONS  (STANDING):  apixaban 2.5 milliGRAM(s) Oral every 12 hours  citalopram 10 milliGRAM(s) Oral <User Schedule>  famotidine   Suspension 20 milliGRAM(s) Oral daily  LORazepam     Tablet 0.25 milliGRAM(s) Oral at bedtime  metoprolol succinate  milliGRAM(s) Oral <User Schedule>  nystatin Ointment 1 Application(s) Topical two times a day  petrolatum white Ointment 1 Application(s) Topical daily  zinc oxide 40% Ointment 1 Application(s) Topical two times a day    MEDICATIONS  (PRN):  acetaminophen    Suspension .. 650 milliGRAM(s) Oral every 6 hours PRN Temp greater or equal to 38C (100.4F), Mild Pain (1 - 3)  aluminum hydroxide/magnesium hydroxide/simethicone Suspension 30 milliLiter(s) Oral every 4 hours PRN Dyspepsia  Biotene Dry Mouth Oral Rinse 15 milliLiter(s) Swish and Spit every 6 hours PRN Mouth Care, Dry Mouth  bisacodyl Suppository 10 milliGRAM(s) Rectal daily PRN Constipation  LORazepam     Tablet 0.25 milliGRAM(s) Oral every 8 hours PRN intractable anxiety/agitation  ondansetron Injectable 4 milliGRAM(s) IV Push every 8 hours PRN Nausea and/or Vomiting  polyethylene glycol 3350 17 Gram(s) Oral daily PRN Constipation    ITEMS UNCHECKED ARE NOT PRESENT    PRESENT SYMPTOMS: [ ]Unable to obtain due to poor mentation   Source if other than patient:  [ ]Family   [ ]Team     Pain: [ ] yes [x ] no  QOL impact -   Location -                    Aggravating factors -  Quality -  Radiation -  Timing-  Severity (0-10 scale):  Minimal acceptable level (0-10 scale):     Dyspnea:                           [ ]Mild [ ]Moderate [ ]Severe  Anxiety:                             [ ]Mild [ ]Moderate [ ]Severe  Fatigue:                             [ ]Mild [ ]Moderate [ ]Severe  Nausea:                             [ ]Mild [ ]Moderate [ ]Severe  Loss of appetite:              [ ]Mild [ ]Moderate [ ]Severe  Constipation:                    [ ]Mild [ ]Moderate [ ]Severe    PAINAD Score:    [email or web address] (Ctrl +  left click to view)  		  Other Symptoms:  [ ]All other review of systems negative     Palliative Performance Status Version 2:    40     %         [email or web address]  PHYSICAL EXAM:  Vital Signs Last 24 Hrs  T(C): 36.7 (24 Feb 2022 08:42), Max: 36.7 (24 Feb 2022 08:42)  T(F): 98 (24 Feb 2022 08:42), Max: 98 (24 Feb 2022 08:42)  HR: 79 (24 Feb 2022 08:42) (79 - 81)  BP: 173/80 (24 Feb 2022 08:42) (155/75 - 173/80)  BP(mean): --  RR: 18 (24 Feb 2022 08:42) (18 - 18)  SpO2: 96% (24 Feb 2022 08:42) (96% - 96%) I&O's Summary  GENERAL:  [x ]Alert  [ ]Oriented x   [ ]Lethargic  [ ]Cachexia  [ ]Unarousable  [x ]Verbal  [ ]Non-Verbal  Behavioral:   [ ] Anxiety  [ ] Delirium [ ] Agitation [ ] Other  HEENT:  [x ]Normal   [ ]Dry mouth   [ ]ET Tube/Trach  [ ]Oral lesions  PULMONARY:   [x ]Clear [ ]Tachypnea  [ ]Audible excessive secretions   [ ]Rhonchi        [ ]Right [ ]Left [ ]Bilateral  [ ]Crackles        [ ]Right [ ]Left [ ]Bilateral  [ ]Wheezing     [ ]Right [ ]Left [ ]Bilateral  [ ]Diminished BS [ ]Right [ ]Left [ ]Bilateral    CARDIOVASCULAR:    [x ]Regular [ ]Irregular [ ]Tachy  [ ]Chao [ ]Murmur [ ]Other  GASTROINTESTINAL:  [ x]Soft  [ ]Distended   [ x]+BS  [x ]Non tender [ ]Tender  [ ]PEG [ ]OGT/ NGT   Last BM:  02/23/2022  GENITOURINARY:  [x ]Normal [ ] Incontinent   [ ]Oliguria/Anuria   [ ]Lopez  MUSCULOSKELETAL:   [ x]Normal   [ ]Weakness  [ ]Bed/Wheelchair bound [ ]Edema  NEUROLOGIC:   [x ]No focal deficits  [ ] Cognitive impairment  [ ] Dysphagia [ ]Dysarthria [ ] Paresis [ ]Other   SKIN:   [x ]Normal  [ ]Rash     [ ]Pressure ulcer(s)  [ ]y [ ]n  Present on admission      CRITICAL CARE:  [ ] Shock Present  [ ]Septic [ ]Cardiogenic [ ]Neurologic [ ]Hypovolemic  [ ]  Vasopressors [ ]  Inotropes   [ ] Respiratory failure present [ ] Mechanical Ventilation [ ] Non-invasive ventilatory support [ ] High-Flow  [ ] Acute  [ ] Chronic [ ] Hypoxic  [ ] Hypercarbic [ ] Other  [ ] Other organ failure     LABS: None new.     RADIOLOGY & ADDITIONAL STUDIES: None new.     PROTEIN CALORIE MALNUTRITION: [ ] mild [ ] moderate [ ] severe  [ ] underweight [ ] morbid obesity    https://www.andeal.org/vault/2440/web/files/ONC/Table_Clinical%20Characteristics%20to%20Document%20Malnutrition-White%20JV%20et%20al%458482.pdf    Height (cm): 162.6 (02-10-22 @ 15:21)  Weight (kg): 49.9 (02-10-22 @ 15:21)  BMI (kg/m2): 18.9 (02-10-22 @ 15:21)    [ ] PPSV2 < or = 30% [ ] significant weight loss [ ] poor nutritional intake [ ] anasarca   Artificial Nutrition [ ]     REFERRALS:   [ ]Chaplaincy  [ ] Hospice  [ ]Child Life  [ ]Social Work  [x ]Case management [ ]Holistic Therapy [ ] Physical Therapy [ ] Dietary   Goals of Care Document:

## 2022-02-24 NOTE — PROGRESS NOTE ADULT - REASON FOR ADMISSION
Failure to thrive

## 2022-02-24 NOTE — PROGRESS NOTE ADULT - PROBLEM SELECTOR PLAN 7
-Cr stable compared to baseline  -avoid nephrotoxic agents  -renally dose all meds  -encourage PO hydration  -above d/w nephrology.
-Cr stable compared to baseline  -avoid nephrotoxic agents  -renally dose all meds  -encourage PO hydration
MARCO on CKD now.   -avoid nephrotoxic agents  -renally dose all meds  -encourage PO hydration  -mgmt as above.
Continue symptom management in the pcu. Medically stable for discharge to [facility].  MOLST completed.  High risk for acute decompensation, alert hospice if [facility] facility in our encashment area.
MARCO on CKD now.   -avoid nephrotoxic agents  -renally dose all meds  -encourage PO hydration  -mgmt as above.
Continue symptom management in the pcu. Medically stable for discharge to [facility].
Continue symptom management in the pcu. Medically stable for discharge to [facility].

## 2022-02-24 NOTE — DISCHARGE NOTE PROVIDER - NSRESEARCHGRANT_OVERRIDEREC_GEN_A_CORE
Other (please specify)/IMPROVE-DD Application Not Available Breath sounds clear and equal bilaterally.

## 2022-02-24 NOTE — DISCHARGE NOTE PROVIDER - CARE PROVIDER_API CALL
[facility],   [address] [place] [address]  Phone: [phone]  Fax: (   )    -  Follow Up Time:

## 2022-02-24 NOTE — DISCHARGE NOTE PROVIDER - HOSPITAL COURSE
H&P- 89F PMH Afib on Eliquis,s/p PPM, CKD, diastolic CHF, CVA s/p thrombectomy p/w progressive decline at home. Also with c/o SOB, SHIN, generalized weakness and dec PO intake. Family who have been taking care of pt at home have noticed a gradual decline over the past 3-weeks. Also report that the pt has been very dyspneic and anxious at night time. She recently started using 1-2L  NC  which has increased to 6L. Per son her O2 saturations have been as low as low-80s. She has taken lasix with  no relief. Family states she has to sleep propped up with several pillows.  She has also takes Ativan prn for anxiety. She has also had poor appetite and has been fed mostly soft items like yoghurt. No reports of dec UO or changes in BM. Of note, she had been evaluated by hospice for Hospice by [facility] ~ 1-week ago but was deemed ineligible at the time. Family seeking for comfort measures only and hospice placement  DX:  SOB lashae 2/2 severe AS-TTE w/ ef 55%, severe Camacho HF + L pleural effusion, s/p IV lasix 20        FTT-palliative consult for hospice evaluation        Afib- cont Toprol XL & Eliquis         MARCO on CKD 3- losartan d/c    2/13 hospice referral sent out as global by CM, PT recommended [facility]          diet switched to soft and bite sized  2/14 c/o constipation, added suppository to bowel regimen. PCU vs. home hospice.  2/17- Patient transferred to the PCU for medical decision making and disposition planning in the setting of adult failure to thrive. ongoing dispo planning with   2/24 PT recommended [facility]. Patient is safe and stable for discharge today to [facility].

## 2022-02-24 NOTE — DISCHARGE NOTE PROVIDER - PROVIDER TOKENS
FREE:[LAST:[[facility]],PHONE:[phone]],FAX:[(   )    -],ADDRESS:[[address] [place] [address]]]

## 2022-02-24 NOTE — PROGRESS NOTE ADULT - PROBLEM SELECTOR PROBLEM 1
Dyspnea
Shortness of breath
Dyspnea
Shortness of breath
Shortness of breath
Dyspnea
Shortness of breath
Dyspnea
Dyspnea
Shortness of breath
Shortness of breath

## 2022-02-24 NOTE — PROGRESS NOTE ADULT - PROBLEM SELECTOR PROBLEM 7
Palliative care encounter
Stage 4 chronic kidney disease
Palliative care encounter
Stage 4 chronic kidney disease
Palliative care encounter

## 2022-02-25 ENCOUNTER — NON-APPOINTMENT (OUTPATIENT)
Age: 87
End: 2022-02-25

## 2022-03-23 ENCOUNTER — NON-APPOINTMENT (OUTPATIENT)
Age: 87
End: 2022-03-23

## 2022-03-23 PROBLEM — I63.9 CEREBRAL INFARCTION, UNSPECIFIED: Chronic | Status: ACTIVE | Noted: 2022-02-10

## 2022-04-11 PROBLEM — Z11.59 SCREENING FOR VIRAL DISEASE: Status: ACTIVE | Noted: 2020-06-04

## 2022-04-12 ENCOUNTER — APPOINTMENT (OUTPATIENT)
Dept: ELECTROPHYSIOLOGY | Facility: CLINIC | Age: 87
End: 2022-04-12
Payer: OTHER MISCELLANEOUS

## 2022-04-12 ENCOUNTER — NON-APPOINTMENT (OUTPATIENT)
Age: 87
End: 2022-04-12

## 2022-04-12 PROCEDURE — 93296 REM INTERROG EVL PM/IDS: CPT

## 2022-04-12 PROCEDURE — 93294 REM INTERROG EVL PM/LDLS PM: CPT

## 2022-05-11 ENCOUNTER — APPOINTMENT (OUTPATIENT)
Dept: ELECTROPHYSIOLOGY | Facility: CLINIC | Age: 87
End: 2022-05-11

## 2022-05-17 ENCOUNTER — APPOINTMENT (OUTPATIENT)
Dept: ELECTROPHYSIOLOGY | Facility: CLINIC | Age: 87
End: 2022-05-17

## 2022-07-12 ENCOUNTER — APPOINTMENT (OUTPATIENT)
Dept: ELECTROPHYSIOLOGY | Facility: CLINIC | Age: 87
End: 2022-07-12

## 2022-07-12 ENCOUNTER — NON-APPOINTMENT (OUTPATIENT)
Age: 87
End: 2022-07-12

## 2022-07-12 PROCEDURE — 93294 REM INTERROG EVL PM/LDLS PM: CPT

## 2022-07-12 PROCEDURE — 93296 REM INTERROG EVL PM/IDS: CPT

## 2022-07-13 ENCOUNTER — NON-APPOINTMENT (OUTPATIENT)
Age: 87
End: 2022-07-13

## 2022-10-11 ENCOUNTER — NON-APPOINTMENT (OUTPATIENT)
Age: 87
End: 2022-10-11

## 2022-10-11 ENCOUNTER — APPOINTMENT (OUTPATIENT)
Dept: ELECTROPHYSIOLOGY | Facility: CLINIC | Age: 87
End: 2022-10-11

## 2022-10-11 PROCEDURE — 93296 REM INTERROG EVL PM/IDS: CPT

## 2022-10-11 PROCEDURE — 93294 REM INTERROG EVL PM/LDLS PM: CPT

## 2022-10-12 ENCOUNTER — NON-APPOINTMENT (OUTPATIENT)
Age: 87
End: 2022-10-12

## 2022-10-31 ENCOUNTER — TRANSCRIPTION ENCOUNTER (OUTPATIENT)
Age: 87
End: 2022-10-31

## 2022-10-31 ENCOUNTER — OUTPATIENT (OUTPATIENT)
Dept: OUTPATIENT SERVICES | Facility: HOSPITAL | Age: 87
LOS: 1 days | End: 2022-10-31
Payer: MEDICARE

## 2022-10-31 VITALS
DIASTOLIC BLOOD PRESSURE: 61 MMHG | TEMPERATURE: 98 F | SYSTOLIC BLOOD PRESSURE: 160 MMHG | HEIGHT: 64 IN | HEART RATE: 107 BPM | WEIGHT: 115.96 LBS | RESPIRATION RATE: 16 BRPM | OXYGEN SATURATION: 100 %

## 2022-10-31 VITALS
HEART RATE: 85 BPM | SYSTOLIC BLOOD PRESSURE: 163 MMHG | DIASTOLIC BLOOD PRESSURE: 88 MMHG | OXYGEN SATURATION: 97 % | RESPIRATION RATE: 18 BRPM | TEMPERATURE: 98 F

## 2022-10-31 DIAGNOSIS — Z95.0 PRESENCE OF CARDIAC PACEMAKER: Chronic | ICD-10-CM

## 2022-10-31 DIAGNOSIS — Z45.010 ENCOUNTER FOR CHECKING AND TESTING OF CARDIAC PACEMAKER PULSE GENERATOR [BATTERY]: ICD-10-CM

## 2022-10-31 LAB
ANION GAP SERPL CALC-SCNC: 12 MMOL/L — SIGNIFICANT CHANGE UP (ref 5–17)
APTT BLD: 32.7 SEC — SIGNIFICANT CHANGE UP (ref 27.5–35.5)
BUN SERPL-MCNC: 33 MG/DL — HIGH (ref 7–23)
CALCIUM SERPL-MCNC: 10.6 MG/DL — HIGH (ref 8.4–10.5)
CHLORIDE SERPL-SCNC: 105 MMOL/L — SIGNIFICANT CHANGE UP (ref 96–108)
CO2 SERPL-SCNC: 25 MMOL/L — SIGNIFICANT CHANGE UP (ref 22–31)
CREAT SERPL-MCNC: 1.48 MG/DL — HIGH (ref 0.5–1.3)
EGFR: 33 ML/MIN/1.73M2 — LOW
GLUCOSE SERPL-MCNC: 115 MG/DL — HIGH (ref 70–99)
HCT VFR BLD CALC: 42.1 % — SIGNIFICANT CHANGE UP (ref 34.5–45)
HGB BLD-MCNC: 13.4 G/DL — SIGNIFICANT CHANGE UP (ref 11.5–15.5)
INR BLD: 1.14 RATIO — SIGNIFICANT CHANGE UP (ref 0.88–1.16)
MCHC RBC-ENTMCNC: 30 PG — SIGNIFICANT CHANGE UP (ref 27–34)
MCHC RBC-ENTMCNC: 31.8 GM/DL — LOW (ref 32–36)
MCV RBC AUTO: 94.2 FL — SIGNIFICANT CHANGE UP (ref 80–100)
NRBC # BLD: 0 /100 WBCS — SIGNIFICANT CHANGE UP (ref 0–0)
PLATELET # BLD AUTO: 240 K/UL — SIGNIFICANT CHANGE UP (ref 150–400)
POTASSIUM SERPL-MCNC: 3.9 MMOL/L — SIGNIFICANT CHANGE UP (ref 3.5–5.3)
POTASSIUM SERPL-SCNC: 3.9 MMOL/L — SIGNIFICANT CHANGE UP (ref 3.5–5.3)
PROTHROM AB SERPL-ACNC: 13.3 SEC — SIGNIFICANT CHANGE UP (ref 10.5–13.4)
RBC # BLD: 4.47 M/UL — SIGNIFICANT CHANGE UP (ref 3.8–5.2)
RBC # FLD: 15.5 % — HIGH (ref 10.3–14.5)
SODIUM SERPL-SCNC: 142 MMOL/L — SIGNIFICANT CHANGE UP (ref 135–145)
WBC # BLD: 7.39 K/UL — SIGNIFICANT CHANGE UP (ref 3.8–10.5)
WBC # FLD AUTO: 7.39 K/UL — SIGNIFICANT CHANGE UP (ref 3.8–10.5)

## 2022-10-31 PROCEDURE — 93005 ELECTROCARDIOGRAM TRACING: CPT

## 2022-10-31 PROCEDURE — 33228 REMV&REPLC PM GEN DUAL LEAD: CPT

## 2022-10-31 PROCEDURE — 85610 PROTHROMBIN TIME: CPT

## 2022-10-31 PROCEDURE — 85730 THROMBOPLASTIN TIME PARTIAL: CPT

## 2022-10-31 PROCEDURE — C1889: CPT

## 2022-10-31 PROCEDURE — 36415 COLL VENOUS BLD VENIPUNCTURE: CPT

## 2022-10-31 PROCEDURE — 80048 BASIC METABOLIC PNL TOTAL CA: CPT

## 2022-10-31 PROCEDURE — 85027 COMPLETE CBC AUTOMATED: CPT

## 2022-10-31 PROCEDURE — C1785: CPT

## 2022-10-31 PROCEDURE — 93010 ELECTROCARDIOGRAM REPORT: CPT

## 2022-10-31 RX ORDER — CEPHALEXIN 500 MG
1 CAPSULE ORAL
Qty: 9 | Refills: 0
Start: 2022-10-31 | End: 2022-11-02

## 2022-10-31 RX ORDER — CYST/ALA/Q10/PHOS.SER/DHA/BROC 100-20-50
1 POWDER (GRAM) ORAL
Qty: 0 | Refills: 0 | DISCHARGE

## 2022-10-31 RX ORDER — ATORVASTATIN CALCIUM 80 MG/1
1 TABLET, FILM COATED ORAL
Qty: 0 | Refills: 0 | DISCHARGE

## 2022-10-31 RX ORDER — CEPHALEXIN 500 MG
250 CAPSULE ORAL THREE TIMES A DAY
Refills: 0 | Status: DISCONTINUED | OUTPATIENT
Start: 2022-10-31 | End: 2022-11-14

## 2022-10-31 RX ORDER — OMEPRAZOLE 10 MG/1
1 CAPSULE, DELAYED RELEASE ORAL
Qty: 0 | Refills: 0 | DISCHARGE

## 2022-10-31 NOTE — DISCHARGE NOTE NURSING/CASE MANAGEMENT/SOCIAL WORK - PATIENT PORTAL LINK FT
You can access the FollowMyHealth Patient Portal offered by [facility] by registering at the following website: [email or web address]. By joining [employer]’s FollowMyHealth portal, you will also be able to view your health information using other applications (apps) compatible with our system.

## 2022-10-31 NOTE — ASU DISCHARGE PLAN (ADULT/PEDIATRIC) - CARE PROVIDER_API CALL
wound check appointment,   at [address], [address]  Phone: (   )    -  Fax: (   )    -  Established Patient  Scheduled Appointment: 11/14/2022 10:40 AM

## 2022-10-31 NOTE — H&P CARDIOLOGY - NSICDXPASTSURGICALHX_GEN_ALL_CORE_FT
PAST SURGICAL HISTORY:  Artificial pacemaker     Hip Fracture s/p hip pinning    History of Abdominal Hysterectomy     S/P appendectomy 2010

## 2022-10-31 NOTE — H&P CARDIOLOGY - HISTORY OF PRESENT ILLNESS
89F PMH Afib on Eliquis ( last dose on 10/30),s/p PPM, CKD, diastolic CHF, CVA s/p thrombectomy( residual right side weakness) presents today for PPM gen change. The remote transmission of PPM shows SHELLY of PPM battery and recommended for Gen change. Denies chest pain, SOB, palpitations / syncope.   Pt lives with Aid at home

## 2022-10-31 NOTE — PATIENT PROFILE ADULT - FALL HARM RISK - HARM RISK INTERVENTIONS
Assistance OOB with selected safe patient handling equipment/Communicate Risk of Fall with Harm to all staff/Discuss with provider need for PT consult/Monitor gait and stability/Provide patient with walking aids - walker, cane, crutches/Reinforce activity limits and safety measures with patient and family/Tailored Fall Risk Interventions/Visual Cue: Yellow wristband and red socks/Bed in lowest position, wheels locked, appropriate side rails in place/Call bell, personal items and telephone in reach/Instruct patient to call for assistance before getting out of bed or chair/Non-slip footwear when patient is out of bed/[place] to call system/Physically safe environment - no spills, clutter or unnecessary equipment/Purposeful Proactive Rounding/Room/bathroom lighting operational, light cord in reach Assistance with ambulation/Assistance OOB with selected safe patient handling equipment/Communicate Risk of Fall with Harm to all staff/Discuss with provider need for PT consult/Monitor gait and stability/Provide patient with walking aids - walker, cane, crutches/Reinforce activity limits and safety measures with patient and family/Tailored Fall Risk Interventions/Visual Cue: Yellow wristband and red socks/Bed in lowest position, wheels locked, appropriate side rails in place/Call bell, personal items and telephone in reach/Instruct patient to call for assistance before getting out of bed or chair/Non-slip footwear when patient is out of bed/[place] to call system/Physically safe environment - no spills, clutter or unnecessary equipment/Purposeful Proactive Rounding/Room/bathroom lighting operational, light cord in reach

## 2022-10-31 NOTE — ASU DISCHARGE PLAN (ADULT/PEDIATRIC) - ASU DC SPECIAL INSTRUCTIONSFT
WOUND CARE:  Do NOT scrub, rub, or pick at your incision site  AFTER 3 DAYS you may SHOWER  - use mild soap and gentle warm, water stream, pat dry  DO NOT apply lotions, creams, ointments, powder, perfumes to your incision site  DO NOT SOAK your site for 4-6 weeks ( no baths, no pools, no tubs, etc...)  wear loose clothing around site for 1-2 weeks  IF surgical tape was used DO NOT remove the strips, they will fall off after 7days, if glue was used, it will naturally fall off within 3 weeks  if staples were used, they will b removed in 7-10 days by your doctor    ACTIVITY:    for 4 weeks AFTER your procedure   - DO NOT LIFT anything 10 lbs or heavier ( on the side of your implant)   - certain activities may be limited longer, those that involve swinging your arm, and will be discussed with your EP doctor    A follow up appointment in 7-14 days will be arranged before your discharge    ID CARD:   you will receive an ID CARD and device company booklet   - please carry that card with you at all times    ***CALL YOUR DOCTOR ***  IF you have fever, chills, body aches, or severe pain, swelling, redness, heat, yellow drainage from your incision site  IF bleeding  or significant new swelling from your puncture site  IF your experience lightheadedness, dizziness, or fainting spell.  IF unable to ge tin contact with your doctor, you may call the Cardiology Office at [facility] at [phone]

## 2022-10-31 NOTE — ASU DISCHARGE PLAN (ADULT/PEDIATRIC) - PROVIDER TOKENS
FREE:[LAST:[wound check appointment],PHONE:[(   )    -],FAX:[(   )    -],ADDRESS:[at [address], [address]],SCHEDULEDAPPT:[11/14/2022],SCHEDULEDAPPTTIME:[10:40 AM],ESTABLISHEDPATIENT:[T]]

## 2022-10-31 NOTE — ASU DISCHARGE PLAN (ADULT/PEDIATRIC) - NS MD DC FALL RISK RISK
For information on Fall & Injury Prevention, visit: [email or web address] OR  [email or web address] OR  https://www.cdc.gov/steadi/patient.html

## 2022-10-31 NOTE — DISCHARGE NOTE NURSING/CASE MANAGEMENT/SOCIAL WORK - NSDCPEFALRISK_GEN_ALL_CORE
For information on Fall & Injury Prevention, visit: [email or web address] OR  [email or web address] OR  https://www.cdc.gov/steadi/patient.html

## 2022-11-01 ENCOUNTER — NON-APPOINTMENT (OUTPATIENT)
Age: 87
End: 2022-11-01

## 2022-11-14 ENCOUNTER — APPOINTMENT (OUTPATIENT)
Dept: ELECTROPHYSIOLOGY | Facility: CLINIC | Age: 87
End: 2022-11-14

## 2022-11-14 PROCEDURE — 99024 POSTOP FOLLOW-UP VISIT: CPT

## 2022-11-14 PROCEDURE — 93294 REM INTERROG EVL PM/LDLS PM: CPT

## 2022-12-01 ENCOUNTER — TRANSCRIPTION ENCOUNTER (OUTPATIENT)
Age: 87
End: 2022-12-01

## 2022-12-30 ENCOUNTER — TRANSCRIPTION ENCOUNTER (OUTPATIENT)
Age: 87
End: 2022-12-30

## 2023-01-11 ENCOUNTER — APPOINTMENT (OUTPATIENT)
Dept: GERIATRICS | Facility: CLINIC | Age: 88
End: 2023-01-11
Payer: MEDICARE

## 2023-01-11 DIAGNOSIS — I50.33 ACUTE ON CHRONIC DIASTOLIC (CONGESTIVE) HEART FAILURE: ICD-10-CM

## 2023-01-11 DIAGNOSIS — R09.02 HYPOXEMIA: ICD-10-CM

## 2023-01-11 PROCEDURE — 99215 OFFICE O/P EST HI 40 MIN: CPT | Mod: 95

## 2023-01-11 RX ORDER — AMOXICILLIN AND CLAVULANATE POTASSIUM 500; 125 MG/1; MG/1
500-125 TABLET, FILM COATED ORAL
Qty: 6 | Refills: 0 | Status: DISCONTINUED | COMMUNITY
Start: 2022-02-03 | End: 2023-01-11

## 2023-01-11 RX ORDER — NIFEDIPINE 60 MG/1
60 TABLET, EXTENDED RELEASE ORAL DAILY
Qty: 90 | Refills: 3 | Status: DISCONTINUED | COMMUNITY
Start: 2018-02-22 | End: 2023-01-11

## 2023-01-11 RX ORDER — CANDESARTAN CILEXETIL 32 MG/1
32 TABLET ORAL DAILY
Qty: 30 | Refills: 0 | Status: DISCONTINUED | COMMUNITY
Start: 2021-04-29 | End: 2023-01-11

## 2023-01-11 NOTE — REVIEW OF SYSTEMS
[Fever] : no fever [Chills] : no chills [Chest Pain] : no chest pain [Shortness Of Breath] : shortness of breath [As Noted in HPI] : as noted in HPI [Difficulty Walking] : difficulty walking [Negative] : Heme/Lymph

## 2023-01-11 NOTE — HISTORY OF PRESENT ILLNESS
[Home] : at home, [unfilled] , at the time of the visit. [Medical Office: ([facility])___] : at the medical office located in  [Family Member] : family member [Verbal consent obtained from patient] : the patient, [unfilled] [FreeTextEntry4] : neida Santos [FreeTextEntry1] : Mrs. Membreno was seen in telehealth visit facilitated by her son Tom.  The patient was disenrolled from hospice late last month.  She is currently homebound, spends most of her day in a recliner and prefers to sleep on the recliner.  She is a minimal ambulator, has a full-time home health aide.  She needs assistance to stand and is able to walk a few steps, mostly the length of the room where she spends her day.  Medications have been reconciled.  The patient has been off most of her antihypertensives and uses Lasix only sparingly.  The patient has an oxygen concentrator at home and uses it on an as-needed basis.  She occasionally develops dyspnea at night and has need of oxygen.\par Of note the patient had a pacemaker generator change in October 2022 [No falls in past year] : Patient reported no falls in the past year [Completely Dependent] : Completely dependent.

## 2023-01-11 NOTE — PHYSICAL EXAM
[No Acute Distress] : in no acute distress [Normal] : the ears and nose were normal in appearance [Respiration, Rhythm And Depth] : normal respiratory rhythm and effort [Involuntary Movements] : no involuntary movements were seen [de-identified] : Frail but not ill-appearing [de-identified] : Trace - 1+ pretibial edema [de-identified] : Speech fluent, moves all extremities

## 2023-01-20 NOTE — PROGRESS NOTE ADULT - PROBLEM SELECTOR PLAN 5
Problem: Discharge Planning  Goal: Discharge to home or other facility with appropriate resources  Outcome: Progressing  Flowsheets (Taken 1/20/2023 1640)  Discharge to home or other facility with appropriate resources:   Identify barriers to discharge with patient and caregiver   Arrange for needed discharge resources and transportation as appropriate   Identify discharge learning needs (meds, wound care, etc)     Problem: Safety - Adult  Goal: Free from fall injury  Outcome: Progressing  Flowsheets (Taken 1/20/2023 1640)  Free From Fall Injury: Instruct family/caregiver on patient safety     Problem: Skin/Tissue Integrity - Adult  Goal: Skin integrity remains intact  Outcome: Progressing  Flowsheets  Taken 1/20/2023 1640  Skin Integrity Remains Intact:   Assess vascular access sites hourly   Monitor for areas of redness and/or skin breakdown  Taken 1/20/2023 1638  Skin Integrity Remains Intact:   Monitor for areas of redness and/or skin breakdown   Assess vascular access sites hourly     Problem: Respiratory - Adult  Goal: Achieves optimal ventilation and oxygenation  Outcome: Progressing  Flowsheets (Taken 1/20/2023 1640)  Achieves optimal ventilation and oxygenation:   Assess for changes in respiratory status   Assess for changes in mentation and behavior  Goal: Clear lung sounds  1/20/2023 1640 by Luiz De La Rosa RN  Outcome: Progressing  1/20/2023 1010 by Alexia Lawson RCP  Outcome: Progressing  Note: Mdi to maintain open airways. Patient mutually agreed on goals.        Problem: Cardiovascular - Adult  Goal: Maintains optimal cardiac output and hemodynamic stability  Outcome: Progressing  Flowsheets (Taken 1/20/2023 1640)  Maintains optimal cardiac output and hemodynamic stability: Monitor blood pressure and heart rate     Problem: Pain  Goal: Verbalizes/displays adequate comfort level or baseline comfort level  Outcome: Progressing  Flowsheets (Taken 1/20/2023 1640)  Verbalizes/displays adequate comfort level or baseline comfort level:   Encourage patient to monitor pain and request assistance   Assess pain using appropriate pain scale   Administer analgesics based on type and severity of pain and evaluate response   Implement non-pharmacological measures as appropriate and evaluate response     Problem: Chronic Conditions and Co-morbidities  Goal: Patient's chronic conditions and co-morbidity symptoms are monitored and maintained or improved  Outcome: Progressing  Flowsheets (Taken 1/20/2023 1640)  Care Plan - Patient's Chronic Conditions and Co-Morbidity Symptoms are Monitored and Maintained or Improved:   Monitor and assess patient's chronic conditions and comorbid symptoms for stability, deterioration, or improvement   Collaborate with multidisciplinary team to address chronic and comorbid conditions and prevent exacerbation or deterioration   Update acute care plan with appropriate goals if chronic or comorbid symptoms are exacerbated and prevent overall improvement and discharge     Problem: Nutrition Deficit:  Goal: Optimize nutritional status  Outcome: Progressing  Flowsheets (Taken 1/20/2023 0027 by Jacklyn Jerez RN)  Nutrient intake appropriate for improving, restoring, or maintaining nutritional needs:   Assess nutritional status and recommend course of action   Monitor oral intake, labs, and treatment plans     Problem: Metabolic/Fluid and Electrolytes - Adult  Goal: Electrolytes maintained within normal limits  Outcome: Progressing  Flowsheets (Taken 1/20/2023 1640)  Electrolytes maintained within normal limits:   Monitor labs and assess patient for signs and symptoms of electrolyte imbalances   Administer electrolyte replacement as ordered   Care plan reviewed with patient. Patient verbalize understanding of the plan of care and contribute to goal setting. Actions:  [x] Rapport building     [x] Symptom assessment    [] Eliciting preferences of goals   [] Prognostic understanding    [] Emotional Support  [] Coping skill development  []  Other  Interdisciplinary Referrals: SW/SM/Hospice  Communication: n/a  Documentation Review: [] Primary Team [] Consultants [] Interdisciplinary team

## 2023-02-13 ENCOUNTER — NON-APPOINTMENT (OUTPATIENT)
Age: 88
End: 2023-02-13

## 2023-02-13 ENCOUNTER — APPOINTMENT (OUTPATIENT)
Dept: ELECTROPHYSIOLOGY | Facility: CLINIC | Age: 88
End: 2023-02-13
Payer: MEDICARE

## 2023-02-13 PROCEDURE — 93296 REM INTERROG EVL PM/IDS: CPT

## 2023-02-13 PROCEDURE — 93294 REM INTERROG EVL PM/LDLS PM: CPT

## 2023-04-14 ENCOUNTER — APPOINTMENT (OUTPATIENT)
Dept: ELECTROPHYSIOLOGY | Facility: CLINIC | Age: 88
End: 2023-04-14
Payer: MEDICARE

## 2023-04-14 ENCOUNTER — NON-APPOINTMENT (OUTPATIENT)
Age: 88
End: 2023-04-14

## 2023-04-14 VITALS
DIASTOLIC BLOOD PRESSURE: 92 MMHG | OXYGEN SATURATION: 98 % | BODY MASS INDEX: 20.05 KG/M2 | SYSTOLIC BLOOD PRESSURE: 195 MMHG | HEIGHT: 63.6 IN | HEART RATE: 70 BPM | WEIGHT: 116 LBS

## 2023-04-14 DIAGNOSIS — I49.5 SICK SINUS SYNDROME: ICD-10-CM

## 2023-04-14 PROCEDURE — 93280 PM DEVICE PROGR EVAL DUAL: CPT

## 2023-04-14 PROCEDURE — 93000 ELECTROCARDIOGRAM COMPLETE: CPT | Mod: 59

## 2023-04-17 ENCOUNTER — TRANSCRIPTION ENCOUNTER (OUTPATIENT)
Age: 88
End: 2023-04-17

## 2023-04-30 ENCOUNTER — RX RENEWAL (OUTPATIENT)
Age: 88
End: 2023-04-30

## 2023-07-03 ENCOUNTER — TRANSCRIPTION ENCOUNTER (OUTPATIENT)
Age: 88
End: 2023-07-03

## 2023-07-14 ENCOUNTER — NON-APPOINTMENT (OUTPATIENT)
Age: 88
End: 2023-07-14

## 2023-07-14 ENCOUNTER — APPOINTMENT (OUTPATIENT)
Dept: ELECTROPHYSIOLOGY | Facility: CLINIC | Age: 88
End: 2023-07-14
Payer: MEDICARE

## 2023-07-14 PROCEDURE — 93296 REM INTERROG EVL PM/IDS: CPT

## 2023-07-14 PROCEDURE — 93294 REM INTERROG EVL PM/LDLS PM: CPT

## 2023-08-17 NOTE — ED PROVIDER NOTE - NS ED ATTENDING STATEMENT MOD
I have personally seen and examined this patient.  I have fully participated in the care of this patient. I have reviewed all pertinent clinical information, including history, physical exam, plan and the Resident’s note and agree except as noted. Rituxan Pregnancy And Lactation Text: This medication is Pregnancy Category C and it isn't know if it is safe during pregnancy. It is unknown if this medication is excreted in breast milk but similar antibodies are known to be excreted.

## 2023-09-19 ENCOUNTER — RX RENEWAL (OUTPATIENT)
Age: 88
End: 2023-09-19

## 2023-09-28 ENCOUNTER — TRANSCRIPTION ENCOUNTER (OUTPATIENT)
Age: 88
End: 2023-09-28

## 2023-10-13 ENCOUNTER — NON-APPOINTMENT (OUTPATIENT)
Age: 88
End: 2023-10-13

## 2023-10-13 ENCOUNTER — APPOINTMENT (OUTPATIENT)
Dept: ELECTROPHYSIOLOGY | Facility: CLINIC | Age: 88
End: 2023-10-13
Payer: MEDICARE

## 2023-10-13 PROCEDURE — 93296 REM INTERROG EVL PM/IDS: CPT

## 2023-10-13 PROCEDURE — 93294 REM INTERROG EVL PM/LDLS PM: CPT

## 2023-10-31 ENCOUNTER — RX RENEWAL (OUTPATIENT)
Age: 88
End: 2023-10-31

## 2023-11-03 ENCOUNTER — TRANSCRIPTION ENCOUNTER (OUTPATIENT)
Age: 88
End: 2023-11-03

## 2023-11-03 ENCOUNTER — APPOINTMENT (OUTPATIENT)
Dept: GERIATRICS | Facility: CLINIC | Age: 88
End: 2023-11-03
Payer: MEDICARE

## 2023-11-03 DIAGNOSIS — E78.00 PURE HYPERCHOLESTEROLEMIA, UNSPECIFIED: ICD-10-CM

## 2023-11-03 DIAGNOSIS — N39.0 URINARY TRACT INFECTION, SITE NOT SPECIFIED: ICD-10-CM

## 2023-11-03 PROCEDURE — 99213 OFFICE O/P EST LOW 20 MIN: CPT | Mod: 95

## 2023-11-03 RX ORDER — OMEPRAZOLE 20 MG/1
20 CAPSULE, DELAYED RELEASE ORAL
Qty: 90 | Refills: 3 | Status: ACTIVE | COMMUNITY
Start: 2020-12-14 | End: 1900-01-01

## 2023-11-08 ENCOUNTER — TRANSCRIPTION ENCOUNTER (OUTPATIENT)
Age: 88
End: 2023-11-08

## 2023-12-19 ENCOUNTER — RX RENEWAL (OUTPATIENT)
Age: 88
End: 2023-12-19

## 2023-12-19 RX ORDER — ATORVASTATIN CALCIUM 40 MG/1
40 TABLET, FILM COATED ORAL
Qty: 90 | Refills: 3 | Status: ACTIVE | COMMUNITY
Start: 2021-09-15 | End: 1900-01-01

## 2023-12-22 ENCOUNTER — APPOINTMENT (OUTPATIENT)
Dept: GERIATRICS | Facility: CLINIC | Age: 88
End: 2023-12-22
Payer: MEDICARE

## 2023-12-22 ENCOUNTER — NON-APPOINTMENT (OUTPATIENT)
Age: 88
End: 2023-12-22

## 2023-12-22 ENCOUNTER — LABORATORY RESULT (OUTPATIENT)
Age: 88
End: 2023-12-22

## 2023-12-22 DIAGNOSIS — R30.0 DYSURIA: ICD-10-CM

## 2023-12-22 PROCEDURE — 99213 OFFICE O/P EST LOW 20 MIN: CPT | Mod: 95

## 2023-12-22 NOTE — PHYSICAL EXAM
[Normal] : alert, in no acute distress [EOMI] : extraocular movements were intact [Respiration, Rhythm And Depth] : normal respiratory rhythm and effort [Edema] : edema was not present [Involuntary Movements] : no involuntary movements were seen [No Focal Deficits] : no focal deficits [de-identified] : Speech fluent, no facial asymmetry, moves all extremities [de-identified] : Answers all questions appropriately

## 2023-12-22 NOTE — REVIEW OF SYSTEMS
[Fever] : no fever [Chills] : no chills [Eye Pain] : no eye pain [Earache] : no earache [Nosebleeds] : no nosebleeds [Chest Pain] : no chest pain [Shortness Of Breath] : no shortness of breath [Wheezing] : no wheezing [Diarrhea] : no diarrhea [Heartburn] : no heartburn [As Noted in HPI] : as noted in HPI [Arthralgias] : no arthralgias [Confused] : no confusion [Convulsions] : no convulsions [Difficulty Walking] : difficulty walking [Anxiety] : anxiety [Negative] : Heme/Lymph

## 2023-12-22 NOTE — HISTORY OF PRESENT ILLNESS
[Home] : at home, [unfilled] , at the time of the visit. [Medical Office: ([facility])___] : at the medical office located in  [Other:____] : [unfilled] [Verbal consent obtained from patient] : the patient, [unfilled] [FreeTextEntry1] : Mrs. Membreno is a 89-year-old woman with multiple medical problems being seen on a telehealth visit.  Patient states that after she was treated with Bactrim last month, her symptoms of dysuria improved but now she is experiencing dysuria for "a couple of weeks".  She denies fevers, chills, sweats.  Denies shortness of breath or chest pain.  Denies flank pain. Patient is bed/recliner bound, she is a minimal ambulator. [No falls in past year] : Patient reported no falls in the past year

## 2023-12-23 ENCOUNTER — NON-APPOINTMENT (OUTPATIENT)
Age: 88
End: 2023-12-23

## 2023-12-24 ENCOUNTER — NON-APPOINTMENT (OUTPATIENT)
Age: 88
End: 2023-12-24

## 2023-12-25 ENCOUNTER — NON-APPOINTMENT (OUTPATIENT)
Age: 88
End: 2023-12-25

## 2023-12-26 LAB
APPEARANCE: ABNORMAL
BILIRUBIN URINE: NEGATIVE
BLOOD URINE: ABNORMAL
COLOR: YELLOW
GLUCOSE QUALITATIVE U: NEGATIVE MG/DL
KETONES URINE: NEGATIVE MG/DL
LEUKOCYTE ESTERASE URINE: ABNORMAL
NITRITE URINE: POSITIVE
PH URINE: 5.5
PROTEIN URINE: 300 MG/DL
SPECIFIC GRAVITY URINE: 1.02
UROBILINOGEN URINE: 0.2 MG/DL

## 2024-01-05 ENCOUNTER — NON-APPOINTMENT (OUTPATIENT)
Age: 89
End: 2024-01-05

## 2024-01-16 ENCOUNTER — RX RENEWAL (OUTPATIENT)
Age: 89
End: 2024-01-16

## 2024-01-16 RX ORDER — CITALOPRAM HYDROBROMIDE 20 MG/1
20 TABLET, FILM COATED ORAL DAILY
Qty: 90 | Refills: 3 | Status: ACTIVE | COMMUNITY
Start: 2022-01-05 | End: 1900-01-01

## 2024-01-29 ENCOUNTER — NON-APPOINTMENT (OUTPATIENT)
Age: 89
End: 2024-01-29

## 2024-01-29 ENCOUNTER — APPOINTMENT (OUTPATIENT)
Dept: ELECTROPHYSIOLOGY | Facility: CLINIC | Age: 89
End: 2024-01-29
Payer: MEDICARE

## 2024-01-29 PROCEDURE — 93294 REM INTERROG EVL PM/LDLS PM: CPT

## 2024-01-29 PROCEDURE — 93296 REM INTERROG EVL PM/IDS: CPT

## 2024-02-14 ENCOUNTER — TRANSCRIPTION ENCOUNTER (OUTPATIENT)
Age: 89
End: 2024-02-14

## 2024-02-22 ENCOUNTER — APPOINTMENT (OUTPATIENT)
Dept: GERIATRICS | Facility: CLINIC | Age: 89
End: 2024-02-22
Payer: MEDICARE

## 2024-02-22 ENCOUNTER — NON-APPOINTMENT (OUTPATIENT)
Age: 89
End: 2024-02-22

## 2024-02-22 VITALS
SYSTOLIC BLOOD PRESSURE: 201 MMHG | OXYGEN SATURATION: 96 % | TEMPERATURE: 97.3 F | BODY MASS INDEX: 20.51 KG/M2 | DIASTOLIC BLOOD PRESSURE: 73 MMHG | HEART RATE: 69 BPM | WEIGHT: 118 LBS | RESPIRATION RATE: 15 BRPM

## 2024-02-22 VITALS — DIASTOLIC BLOOD PRESSURE: 90 MMHG | SYSTOLIC BLOOD PRESSURE: 190 MMHG

## 2024-02-22 DIAGNOSIS — I48.91 UNSPECIFIED ATRIAL FIBRILLATION: ICD-10-CM

## 2024-02-22 DIAGNOSIS — I10 ESSENTIAL (PRIMARY) HYPERTENSION: ICD-10-CM

## 2024-02-22 DIAGNOSIS — Z23 ENCOUNTER FOR IMMUNIZATION: ICD-10-CM

## 2024-02-22 DIAGNOSIS — Z71.89 OTHER SPECIFIED COUNSELING: ICD-10-CM

## 2024-02-22 DIAGNOSIS — Z00.00 ENCOUNTER FOR GENERAL ADULT MEDICAL EXAMINATION W/OUT ABNORMAL FINDINGS: ICD-10-CM

## 2024-02-22 DIAGNOSIS — F41.9 ANXIETY DISORDER, UNSPECIFIED: ICD-10-CM

## 2024-02-22 PROCEDURE — G0439: CPT | Mod: GC

## 2024-02-22 PROCEDURE — G0008: CPT

## 2024-02-22 PROCEDURE — 90662 IIV NO PRSV INCREASED AG IM: CPT

## 2024-02-22 RX ORDER — FUROSEMIDE 20 MG/1
20 TABLET ORAL
Qty: 8 | Refills: 0 | Status: DISCONTINUED | COMMUNITY
Start: 2022-02-06 | End: 2024-02-22

## 2024-02-22 RX ORDER — CEPHALEXIN 500 MG/1
500 CAPSULE ORAL
Qty: 14 | Refills: 0 | Status: DISCONTINUED | COMMUNITY
Start: 2023-12-22 | End: 2024-02-22

## 2024-02-22 RX ORDER — SULFAMETHOXAZOLE AND TRIMETHOPRIM 800; 160 MG/1; MG/1
800-160 TABLET ORAL TWICE DAILY
Qty: 10 | Refills: 0 | Status: DISCONTINUED | COMMUNITY
Start: 2023-11-03 | End: 2024-02-22

## 2024-02-22 NOTE — HISTORY OF PRESENT ILLNESS
[No falls in past year] : Patient reported no falls in the past year [] : feeding [Completely Dependent] : Completely dependent. [Patient reported vision is abnormal] : Patient reported vision is abnormal [Patient reported hearing was abnormal] : Patient reported hearing was abnormal [PMH Reviewed and Updated] : past medical history reviewed and updated [___ Daughters] : [unfilled] daughter(s) [Children] : children [Compliant with medications] : compliant with medications [Needs some help with ADLs] : need some help with ADLs [Does not drive] : does not drive [Advanced Directives Previoulsy Documented] : Advanced Directives was previously documented [Name: ___] : Health Care Proxy's Name: [unfilled]  [Designated Healthcare Proxy] : Designated healthcare proxy [Relationship: ___] : Relationship: [unfilled] [PSH Reviewed and Updated] : past surgical history reviewed and updated [0] : 0 [Medication and Allergies Reconciled] : medication and allergies reconciled [Over the Past 2 Weeks, Have You Felt Little Interest or Pleasure Doing Things?] : 2.) Over the past 2 weeks, have you felt little interest or pleasure doing things? No [Retired] : retired from work [Over the Past 2 Weeks, Have You Felt Down, Depressed, or Hopeless?] : 1.) Over the past 2 weeks, have you felt down, depressed, or hopeless? No [None] : The patient has no concerns about alcohol abuse [Low Salt Diet] : low salt [Never] : has never used illicit drugs [No history of falls] : no history of falls [Can not Exercise (Disability)] : Exercise: The patient can not exercise due to disability [de-identified] : Denies pain currently [FreeTextEntry1] : The pt 89 yo F with PMH of jose martino on eliquis, anxiety, HLD, GERD, HTN, PPM presented for annual visit.   Recently seen the electrophysiologist who mentioned patient's ventricular rates while in AF are well controlled. Her HR's do go up to 167bpm. AF burden 13%.   Pt previously used to walk more than now; has a wheelchair; also uses a walker   Denies any other complaints.   Social hx: Lives with daughters - has HHA 24 hrs X 7 days   Vaccines - Getting flu shot today  - not interested in getting PNA, shingles, COVID booster,  [TextBox_25] : cant recall the last time did DEXA  [TextBox_31] : using hearing aids for 5 years  [Smoke Detector] : smoke detector [Little interest or pleasure doing things] : 1) Little interest or pleasure doing things [Feeling down, depressed, or hopeless] : 2) Feeling down, depressed, or hopeless [[id number]] : 0 [Reviewed no changes] : Reviewed, no changes [AdvancecareDate] : 01/31/2022

## 2024-02-22 NOTE — REVIEW OF SYSTEMS
[Eye Pain] : no eye pain [Loss Of Hearing] : hearing loss [SOB on Exertion] : shortness of breath during exertion [Dysuria] : no dysuria [Negative] : Heme/Lymph [de-identified] : poor STM

## 2024-02-22 NOTE — END OF VISIT
[] : Fellow [FreeTextEntry3] : Mrs. Membreno was seen with Dr. Latif, geriatric fellow.  I obtained a detailed history and personally examined the patient.  I discussed my findings and plan with the patient, her daughters and the fellow.  I reviewed the fellow's note and agree with assessment and plan.  Patient is a 89-year-old woman with a history of advanced cardiomyopathy, hypertension and chronic kidney disease.  She was formally on hospice but "graduated" last year.  The patient has had a cognitive and functional decline.  She is less ambulatory within her own home, becomes winded with household distances.  She is also experiencing "weakness". The patient spends most of her day in her recliner.  She enjoys doing word puzzles.  She is dependent in all ADLs.  Blood pressure is elevated.  On exam, aside from minor ankle swelling she is euvolemic.  Per chart review AF burden is 13%. Patient's BP log reviews the majority of the blood pressures to be in the 140s to 160s systolic range with occasional excursions to 180.  Reasonable to start low-dose calcium blocker.  Monitor for edema or worsening fatigue.  Will update labs to rule out other etiologies for weakness.  Also reviewed advanced directives, MOLST in place, no changes.

## 2024-02-23 LAB
ALBUMIN SERPL ELPH-MCNC: 4 G/DL
ALP BLD-CCNC: 126 U/L
ALT SERPL-CCNC: 21 U/L
ANION GAP SERPL CALC-SCNC: 13 MMOL/L
AST SERPL-CCNC: 27 U/L
BASOPHILS # BLD AUTO: 0.05 K/UL
BASOPHILS NFR BLD AUTO: 0.8 %
BILIRUB SERPL-MCNC: 0.9 MG/DL
BUN SERPL-MCNC: 31 MG/DL
CALCIUM SERPL-MCNC: 10.4 MG/DL
CALCIUM SERPL-MCNC: 10.4 MG/DL
CHLORIDE SERPL-SCNC: 105 MMOL/L
CHOLEST SERPL-MCNC: 176 MG/DL
CO2 SERPL-SCNC: 24 MMOL/L
CREAT SERPL-MCNC: 1.52 MG/DL
EGFR: 32 ML/MIN/1.73M2
EOSINOPHIL # BLD AUTO: 0.07 K/UL
EOSINOPHIL NFR BLD AUTO: 1.1 %
FOLATE SERPL-MCNC: >20 NG/ML
GLUCOSE SERPL-MCNC: 101 MG/DL
HCT VFR BLD CALC: 40.6 %
HDLC SERPL-MCNC: 46 MG/DL
HGB BLD-MCNC: 12.4 G/DL
IMM GRANULOCYTES NFR BLD AUTO: 0.3 %
LDLC SERPL CALC-MCNC: 112 MG/DL
LYMPHOCYTES # BLD AUTO: 1.23 K/UL
LYMPHOCYTES NFR BLD AUTO: 18.6 %
MAGNESIUM SERPL-MCNC: 1.8 MG/DL
MAN DIFF?: NORMAL
MCHC RBC-ENTMCNC: 30.5 GM/DL
MCHC RBC-ENTMCNC: 30.5 PG
MCV RBC AUTO: 100 FL
MONOCYTES # BLD AUTO: 0.64 K/UL
MONOCYTES NFR BLD AUTO: 9.7 %
NEUTROPHILS # BLD AUTO: 4.59 K/UL
NEUTROPHILS NFR BLD AUTO: 69.5 %
NONHDLC SERPL-MCNC: 130 MG/DL
PARATHYROID HORMONE INTACT: 250 PG/ML
PHOSPHATE SERPL-MCNC: 3 MG/DL
PLATELET # BLD AUTO: 259 K/UL
POTASSIUM SERPL-SCNC: 4.7 MMOL/L
PROT SERPL-MCNC: 6.9 G/DL
RBC # BLD: 4.06 M/UL
RBC # FLD: 16.4 %
SODIUM SERPL-SCNC: 143 MMOL/L
TRIGL SERPL-MCNC: 95 MG/DL
TSH SERPL-ACNC: 3.51 UIU/ML
URATE SERPL-MCNC: 7.7 MG/DL
VIT B12 SERPL-MCNC: 1323 PG/ML
WBC # FLD AUTO: 6.6 K/UL

## 2024-03-04 ENCOUNTER — NON-APPOINTMENT (OUTPATIENT)
Age: 89
End: 2024-03-04

## 2024-03-05 RX ORDER — APIXABAN 2.5 MG/1
2.5 TABLET, FILM COATED ORAL
Qty: 180 | Refills: 3 | Status: ACTIVE | COMMUNITY
Start: 2024-03-05 | End: 1900-01-01

## 2024-03-18 ENCOUNTER — TRANSCRIPTION ENCOUNTER (OUTPATIENT)
Age: 89
End: 2024-03-18

## 2024-03-18 RX ORDER — NIFEDIPINE 30 MG/1
30 TABLET, EXTENDED RELEASE ORAL DAILY
Qty: 90 | Refills: 3 | Status: ACTIVE | COMMUNITY
Start: 2024-02-22 | End: 1900-01-01

## 2024-03-19 ENCOUNTER — TRANSCRIPTION ENCOUNTER (OUTPATIENT)
Age: 89
End: 2024-03-19

## 2024-03-19 RX ORDER — METOPROLOL SUCCINATE 100 MG/1
100 TABLET, EXTENDED RELEASE ORAL
Qty: 180 | Refills: 1 | Status: ACTIVE | COMMUNITY
Start: 2021-09-15 | End: 1900-01-01

## 2024-03-21 ENCOUNTER — TRANSCRIPTION ENCOUNTER (OUTPATIENT)
Age: 89
End: 2024-03-21

## 2024-03-27 NOTE — PROGRESS NOTE ADULT - PROBLEM SELECTOR PLAN 1
Multifactorial- acute on chronic diastolic HF, AS, CKD  No complaints at this time, off nasal cannula none

## 2024-04-02 ENCOUNTER — APPOINTMENT (OUTPATIENT)
Dept: GERIATRICS | Facility: CLINIC | Age: 89
End: 2024-04-02
Payer: MEDICARE

## 2024-04-02 VITALS
HEIGHT: 63 IN | WEIGHT: 118 LBS | HEART RATE: 70 BPM | SYSTOLIC BLOOD PRESSURE: 201 MMHG | DIASTOLIC BLOOD PRESSURE: 82 MMHG | TEMPERATURE: 97.9 F | OXYGEN SATURATION: 96 % | RESPIRATION RATE: 16 BRPM | BODY MASS INDEX: 20.91 KG/M2

## 2024-04-02 DIAGNOSIS — Z79.01 ENCOUNTER FOR THERAPEUTIC DRUG LVL MONITORING: ICD-10-CM

## 2024-04-02 DIAGNOSIS — Z51.81 ENCOUNTER FOR THERAPEUTIC DRUG LVL MONITORING: ICD-10-CM

## 2024-04-02 DIAGNOSIS — T16.2XXA FOREIGN BODY IN LEFT EAR, INITIAL ENCOUNTER: ICD-10-CM

## 2024-04-02 DIAGNOSIS — H60.521: ICD-10-CM

## 2024-04-02 DIAGNOSIS — T16.1XXA FOREIGN BODY IN RIGHT EAR, INITIAL ENCOUNTER: ICD-10-CM

## 2024-04-02 PROCEDURE — 97597 DBRDMT OPN WND 1ST 20 CM/<: CPT

## 2024-04-02 PROCEDURE — 99214 OFFICE O/P EST MOD 30 MIN: CPT | Mod: 25

## 2024-04-02 NOTE — HISTORY OF PRESENT ILLNESS
[FreeTextEntry1] : Mrs. Membreno presents for follow-up accompanied by her 2 daughters and her home health aide.  The patient has a history of chronic hearing loss, wears bilateral hearing aids but it is the right ear that she still has some hearing left.  The patient over the past few weeks has been unable to hear.  Family has been been unable to insert the right hearing aid.  The patient feels otherwise well.  She has discomfort in her right ear but no real pain.  There is no drainage from the ears.

## 2024-04-02 NOTE — PHYSICAL EXAM
[EOMI] : extraocular movements were intact [Auscultation Breath Sounds / Voice Sounds] : lungs were clear to auscultation bilaterally [Supple] : the neck was supple [Normal S1, S2] : normal S1 and S2 [Edema] : edema was not present [Normal Appearance] : normal in appearance [Abdomen Tenderness] : non-tender [Cervical Lymph Nodes Enlarged Anterior Bilaterally] : anterior cervical, supraclavicular [Cervical Lymph Nodes Enlarged Posterior Bilaterally] : posterior cervical [No Spinal Tenderness] : no spinal tenderness [Involuntary Movements] : no involuntary movements were seen [No Focal Deficits] : no focal deficits [Normal Color / Pigmentation] : normal skin color and pigmentation [Normal Affect] : the affect was normal [de-identified] : Left ear impaction. using a rigid curette, I was able to dislodge the hard plastic tip of a hearing aid.  There is minimal residual wax deep in the left canal but the eardrum appears intact. Right ear impaction.  Metallic foreign object visualized deep in the canal. using a rigid curette I was able to gently dislodge the object and with forceps removed a hearing aid battery from the canal.  The canal appears pale, slightly macerated and narrowed.  Unable to visualize the eardrum.

## 2024-04-02 NOTE — ASSESSMENT
[FreeTextEntry1] : Removed foreign bodies from bilateral ear canals.  Patient feels better, states she can hear without her hearing aids.  There is concerned that the right canal may have sustained an injury, possible battery leak.  Will start Cortisporin otic.  The patient has follow-up with ENT on April 19.  I called ENT and was able to get an appointment for pt later this week.  Advised family to leave the hearing aids out for now. Remainder of exam at baseline, continue current therapies

## 2024-04-03 ENCOUNTER — TRANSCRIPTION ENCOUNTER (OUTPATIENT)
Age: 89
End: 2024-04-03

## 2024-04-03 RX ORDER — COLISTIN SULFATE, NEOMYCIN SULFATE, THONZONIUM BROMIDE AND HYDROCORTISONE ACETATE 3; 3.3; .5; 1 MG/ML; MG/ML; MG/ML; MG/ML
3.3-3-10-0.5 SUSPENSION AURICULAR (OTIC)
Qty: 1 | Refills: 1 | Status: ACTIVE | COMMUNITY
Start: 2024-04-02 | End: 1900-01-01

## 2024-04-04 ENCOUNTER — TRANSCRIPTION ENCOUNTER (OUTPATIENT)
Age: 89
End: 2024-04-04

## 2024-04-04 ENCOUNTER — APPOINTMENT (OUTPATIENT)
Dept: OTOLARYNGOLOGY | Facility: CLINIC | Age: 89
End: 2024-04-04
Payer: MEDICARE

## 2024-04-04 VITALS
BODY MASS INDEX: 20.91 KG/M2 | SYSTOLIC BLOOD PRESSURE: 186 MMHG | WEIGHT: 118 LBS | DIASTOLIC BLOOD PRESSURE: 81 MMHG | HEART RATE: 69 BPM | HEIGHT: 63 IN | TEMPERATURE: 97.8 F

## 2024-04-04 DIAGNOSIS — H90.3 SENSORINEURAL HEARING LOSS, BILATERAL: ICD-10-CM

## 2024-04-04 PROCEDURE — 99203 OFFICE O/P NEW LOW 30 MIN: CPT

## 2024-04-04 NOTE — PHYSICAL EXAM
[Midline] : trachea located in midline position [Normal] : no rashes [de-identified] : no abrasions or irritation noted of the ear canals bilaterally

## 2024-04-04 NOTE — CONSULT LETTER
[Dear  ___] : Dear  [unfilled], [Consult Letter:] : I had the pleasure of evaluating your patient, [unfilled]. [Please see my note below.] : Please see my note below. [Consult Closing:] : Thank you very much for allowing me to participate in the care of this patient.  If you have any questions, please do not hesitate to contact me. [Sincerely,] : Sincerely, [FreeTextEntry3] : Suman Finley MD [facility] Associated Professor, [employer]

## 2024-04-04 NOTE — HISTORY OF PRESENT ILLNESS
[de-identified] : Patient wears hearing aids, comes in with foreign bodies in the ears recently- the PCP was able to remove them but wanted her to be seen due to irritation in the ear canal that resulted. She was prescribed antibiotic ear drops that didn't come in yet to the pharmacy  Patient and family with her today are not sure where she goes for her hearing aids and maintenance.

## 2024-04-04 NOTE — ASSESSMENT
[FreeTextEntry1] : Patient 89 has hearing aids apparently the hearing aid fell apart in her ear Dr. Loredo removed the parts referred here for evaluation.  On examination there is no missing parts no irritation no damage to the ear canal minimal amount of wax for which I told him to continue using the drops that will help I had a long conversation with his son with her son I recommend that she follow-up back to the audiologist where she got the hearing aids from with L evaluate both the hearing aids and do a full audiologic evaluation to see if the hearing aids are still functioning and if she did not need any adjustment since their most likely under warrantee is in her best interest to go that route.  This was explained to the son and understood.  They will follow-up with us if they have any concerns or any other needs that are not being met.

## 2024-04-04 NOTE — END OF VISIT
[FreeTextEntry3] : I, Dr. Finley personally performed the evaluation and management (E/M) services including all necessary procedures, for this new patient. That E/M includes conducting the clinically appropriate initial history &/or exam, assessing all conditions, and establishing the plan of care. Today, my EARLINE, Hali Beasley, was here to observe &/or participate in the visit & follow plan of care established by me.

## 2024-04-16 ENCOUNTER — RESULT CHARGE (OUTPATIENT)
Age: 89
End: 2024-04-16

## 2024-04-17 ENCOUNTER — NON-APPOINTMENT (OUTPATIENT)
Age: 89
End: 2024-04-17

## 2024-04-17 ENCOUNTER — APPOINTMENT (OUTPATIENT)
Dept: ELECTROPHYSIOLOGY | Facility: CLINIC | Age: 89
End: 2024-04-17
Payer: MEDICARE

## 2024-04-17 VITALS — SYSTOLIC BLOOD PRESSURE: 136 MMHG | HEART RATE: 69 BPM | OXYGEN SATURATION: 96 % | DIASTOLIC BLOOD PRESSURE: 69 MMHG

## 2024-04-17 PROCEDURE — 93280 PM DEVICE PROGR EVAL DUAL: CPT

## 2024-04-17 PROCEDURE — 93000 ELECTROCARDIOGRAM COMPLETE: CPT | Mod: 59

## 2024-07-12 NOTE — PROGRESS NOTE ADULT - SUBJECTIVE AND OBJECTIVE BOX
[facility]   [phone]  RENAL FOLLOW UP NOTE  --------------------------------------------------------------------------------  Chief Complaint:    24 hour events/subjective:    seen earlier   daughter at bedside   still with decrease po intake     PAST HISTORY  --------------------------------------------------------------------------------  No significant changes to PMH, PSH, FHx, SHx, unless otherwise noted    ALLERGIES & MEDICATIONS  --------------------------------------------------------------------------------  Allergies    No Known Allergies    Intolerances      Standing Inpatient Medications  apixaban 2.5 milliGRAM(s) Oral every 12 hours  atorvastatin 40 milliGRAM(s) Oral <User Schedule>  citalopram 10 milliGRAM(s) Oral <User Schedule>  metoprolol succinate  milliGRAM(s) Oral <User Schedule>  pantoprazole    Tablet 40 milliGRAM(s) Oral before breakfast  petrolatum white Ointment 1 Application(s) Topical daily  polyethylene glycol 3350 17 Gram(s) Oral daily  senna 2 Tablet(s) Oral at bedtime    PRN Inpatient Medications  acetaminophen     Tablet .. 650 milliGRAM(s) Oral every 6 hours PRN  aluminum hydroxide/magnesium hydroxide/simethicone Suspension 30 milliLiter(s) Oral every 4 hours PRN  bisacodyl Suppository 10 milliGRAM(s) Rectal daily PRN  melatonin 3 milliGRAM(s) Oral at bedtime PRN  ondansetron Injectable 4 milliGRAM(s) IV Push every 8 hours PRN      REVIEW OF SYSTEMS  --------------------------------------------------------------------------------    Gen: denies  fevers/chills,  CVS: denies chest pain/palpitations  Resp: denies SOB/Cough  GI: Denies N/V/Abd pain  : Denies dysuria      VITALS/PHYSICAL EXAM  --------------------------------------------------------------------------------  T(C): 36.8 (02-16-22 @ 18:15), Max: 37.4 (02-16-22 @ 05:08)  HR: 78 (02-16-22 @ 18:15) (72 - 78)  BP: 137/58 (02-16-22 @ 18:15) (106/60 - 137/58)  RR: 18 (02-16-22 @ 18:15) (18 - 18)  SpO2: 97% (02-16-22 @ 18:15) (97% - 100%)  Wt(kg): --        02-15-22 @ 07:01  -  02-16-22 @ 07:00  --------------------------------------------------------  IN: 640 mL / OUT: 200 mL / NET: 440 mL    02-16-22 @ 07:01  -  02-16-22 @ 20:47  --------------------------------------------------------  IN: 840 mL / OUT: 475 mL / NET: 365 mL      Physical Exam:  	    	    Gen: thin elderly F  on O2   	no jvd   	Pulm: decrease bs  no rales or ronchi or wheezing  	CV: RRR, S1S2; no rub  	Abd: +BS, soft, nontender/nondistended  	: No suprapubic tenderness  	UE: Warm, no cyanosis  no clubbing,  no edema  	LE: Warm, no cyanosis  no clubbing, no edema      LABS/STUDIES  --------------------------------------------------------------------------------              9.8    4.89  >-----------<  176      [02-16-22 @ 06:30]              30.9     142  |  104  |  62  ----------------------------<  88      [02-16-22 @ 06:30]  4.1   |  28  |  2.01        Ca     9.8     [02-16-22 @ 06:30]            Creatinine Trend:  SCr 2.01 [02-16 @ 06:30]  SCr 1.92 [02-15 @ 07:09]  SCr 1.92 [02-14 @ 07:12]  SCr 1.93 [02-13 @ 06:47]  SCr 1.71 [02-12 @ 07:14]                  TSH 4.01      [02-12-22 @ 10:11]       Hetal Prado

## 2024-07-16 ENCOUNTER — NON-APPOINTMENT (OUTPATIENT)
Age: 89
End: 2024-07-16

## 2024-07-17 ENCOUNTER — APPOINTMENT (OUTPATIENT)
Dept: ELECTROPHYSIOLOGY | Facility: CLINIC | Age: 89
End: 2024-07-17
Payer: MEDICARE

## 2024-07-17 PROCEDURE — 93294 REM INTERROG EVL PM/LDLS PM: CPT

## 2024-07-17 PROCEDURE — 93296 REM INTERROG EVL PM/IDS: CPT

## 2024-08-27 ENCOUNTER — TRANSCRIPTION ENCOUNTER (OUTPATIENT)
Age: 89
End: 2024-08-27

## 2024-10-18 ENCOUNTER — APPOINTMENT (OUTPATIENT)
Dept: ELECTROPHYSIOLOGY | Facility: CLINIC | Age: 89
End: 2024-10-18

## 2024-10-18 ENCOUNTER — NON-APPOINTMENT (OUTPATIENT)
Age: 89
End: 2024-10-18

## 2024-10-18 PROCEDURE — 93294 REM INTERROG EVL PM/LDLS PM: CPT

## 2024-10-18 PROCEDURE — 93296 REM INTERROG EVL PM/IDS: CPT

## 2024-11-05 ENCOUNTER — RX RENEWAL (OUTPATIENT)
Age: 89
End: 2024-11-05

## 2024-11-05 ENCOUNTER — TRANSCRIPTION ENCOUNTER (OUTPATIENT)
Age: 89
End: 2024-11-05

## 2024-12-17 ENCOUNTER — TRANSCRIPTION ENCOUNTER (OUTPATIENT)
Age: 88
End: 2024-12-17

## 2024-12-19 ENCOUNTER — APPOINTMENT (OUTPATIENT)
Dept: GERIATRICS | Facility: CLINIC | Age: 88
End: 2024-12-19
Payer: MEDICARE

## 2024-12-19 DIAGNOSIS — B02.30 ZOSTER OCULAR DISEASE, UNSPECIFIED: ICD-10-CM

## 2024-12-19 DIAGNOSIS — I50.32 CHRONIC DIASTOLIC (CONGESTIVE) HEART FAILURE: ICD-10-CM

## 2024-12-19 PROCEDURE — G2211 COMPLEX E/M VISIT ADD ON: CPT

## 2024-12-19 PROCEDURE — 99213 OFFICE O/P EST LOW 20 MIN: CPT

## 2025-01-01 ENCOUNTER — NON-APPOINTMENT (OUTPATIENT)
Age: 89
End: 2025-01-01

## 2025-01-01 ENCOUNTER — APPOINTMENT (OUTPATIENT)
Dept: GERIATRICS | Facility: CLINIC | Age: 89
End: 2025-01-01
Payer: MEDICARE

## 2025-01-01 ENCOUNTER — TRANSCRIPTION ENCOUNTER (OUTPATIENT)
Age: 89
End: 2025-01-01

## 2025-01-01 DIAGNOSIS — I48.91 UNSPECIFIED ATRIAL FIBRILLATION: ICD-10-CM

## 2025-01-01 DIAGNOSIS — R09.02 HYPOXEMIA: ICD-10-CM

## 2025-01-01 DIAGNOSIS — J10.1 INFLUENZA DUE TO OTHER IDENTIFIED INFLUENZA VIRUS WITH OTHER RESPIRATORY MANIFESTATIONS: ICD-10-CM

## 2025-01-01 DIAGNOSIS — N18.2 CHRONIC KIDNEY DISEASE, STAGE 2 (MILD): ICD-10-CM

## 2025-01-01 DIAGNOSIS — I50.32 CHRONIC DIASTOLIC (CONGESTIVE) HEART FAILURE: ICD-10-CM

## 2025-01-01 DIAGNOSIS — Z51.5 ENCOUNTER FOR PALLIATIVE CARE: ICD-10-CM

## 2025-01-01 DIAGNOSIS — E78.00 PURE HYPERCHOLESTEROLEMIA, UNSPECIFIED: ICD-10-CM

## 2025-01-01 DIAGNOSIS — R09.89 OTHER SPECIFIED SYMPTOMS AND SIGNS INVOLVING THE CIRCULATORY AND RESPIRATORY SYSTEMS: ICD-10-CM

## 2025-01-01 PROCEDURE — G2211 COMPLEX E/M VISIT ADD ON: CPT | Mod: 2W

## 2025-01-01 PROCEDURE — 99214 OFFICE O/P EST MOD 30 MIN: CPT | Mod: 2W

## 2025-01-01 RX ORDER — LORAZEPAM 2 MG/ML
2 CONCENTRATE ORAL
Qty: 1 | Refills: 0 | Status: ACTIVE | COMMUNITY
Start: 2025-01-01 | End: 1900-01-01

## 2025-01-01 RX ORDER — MORPHINE SULFATE 10 MG/5ML
10 SOLUTION ORAL
Qty: 1 | Refills: 0 | Status: ACTIVE | COMMUNITY
Start: 2025-01-01 | End: 1900-01-01

## 2025-01-17 ENCOUNTER — APPOINTMENT (OUTPATIENT)
Dept: ELECTROPHYSIOLOGY | Facility: CLINIC | Age: 89
End: 2025-01-17

## 2025-01-17 PROCEDURE — 93296 REM INTERROG EVL PM/IDS: CPT

## 2025-01-17 PROCEDURE — 93294 REM INTERROG EVL PM/LDLS PM: CPT

## 2025-02-06 PROBLEM — J10.1 INFLUENZA A: Status: ACTIVE | Noted: 2025-01-01

## 2025-02-08 PROBLEM — Z51.5 END OF LIFE CARE: Status: ACTIVE | Noted: 2025-01-01

## 2025-02-08 PROBLEM — R09.89 AIR HUNGER: Status: ACTIVE | Noted: 2025-01-01

## 2025-03-31 NOTE — PATIENT PROFILE ADULT - DO YOU NEED ADDITIONAL SERVICES TO MANAGE ANY OF THESE MEDICAL CONDITIONS AT HOME?
S/p right ring finger revision amputation, nail bed repair and tx of open distal phalanx fracture 3/13/2025. Denies numbness or tingling.     Physical Examination:  The patient appears to be their stated age, is in no apparent distress, and is oriented x3. The patients mood and affect are appropriate. The patients gait is normal. The examination of the limb in question was performed in comparison to the contralateral limb.    Well-healed granulation tissue throughout the amputation base.  No erythema warmth or signs of infection  AIN, PIN, ulnar intact  SILT A/R/U/M  Hand wwp        Assessment:  2.5 weeks postop    Plan:   Weight bearing status: Maintain NWB R ring finger  Immobilization: Band aid when outside the home, leave open to air at home  Discussed densensitization techniques  Encouraged digit ROM  Pain controlled on anti-inflammatories  Follow up 3 weeks with XR R ring finger       Monica Eastman MD    no

## 2025-04-23 ENCOUNTER — APPOINTMENT (OUTPATIENT)
Dept: ELECTROPHYSIOLOGY | Facility: CLINIC | Age: 89
End: 2025-04-23

## 2025-07-23 ENCOUNTER — APPOINTMENT (OUTPATIENT)
Dept: ELECTROPHYSIOLOGY | Facility: CLINIC | Age: 89
End: 2025-07-23
